# Patient Record
Sex: MALE | Race: WHITE | Employment: OTHER | URBAN - METROPOLITAN AREA
[De-identification: names, ages, dates, MRNs, and addresses within clinical notes are randomized per-mention and may not be internally consistent; named-entity substitution may affect disease eponyms.]

---

## 2017-02-10 ENCOUNTER — APPOINTMENT (OUTPATIENT)
Dept: LAB | Age: 77
End: 2017-02-10
Attending: INTERNAL MEDICINE
Payer: COMMERCIAL

## 2017-02-10 DIAGNOSIS — E11.9 TYPE 2 DIABETES MELLITUS WITHOUT COMPLICATION, WITHOUT LONG-TERM CURRENT USE OF INSULIN (HCC): ICD-10-CM

## 2017-02-10 LAB — HBA1C MFR BLD: 9 % (ref 4–6)

## 2017-02-10 PROCEDURE — 83036 HEMOGLOBIN GLYCOSYLATED A1C: CPT

## 2017-02-10 PROCEDURE — 36415 COLL VENOUS BLD VENIPUNCTURE: CPT

## 2017-02-13 ENCOUNTER — OFFICE VISIT (OUTPATIENT)
Dept: INTERNAL MEDICINE CLINIC | Facility: CLINIC | Age: 77
End: 2017-02-13

## 2017-02-13 VITALS
HEART RATE: 88 BPM | DIASTOLIC BLOOD PRESSURE: 66 MMHG | HEIGHT: 72 IN | SYSTOLIC BLOOD PRESSURE: 106 MMHG | WEIGHT: 249 LBS | RESPIRATION RATE: 16 BRPM | BODY MASS INDEX: 33.72 KG/M2

## 2017-02-13 DIAGNOSIS — E78.2 MIXED HYPERLIPIDEMIA: ICD-10-CM

## 2017-02-13 DIAGNOSIS — I10 ESSENTIAL HYPERTENSION WITH GOAL BLOOD PRESSURE LESS THAN 130/85: ICD-10-CM

## 2017-02-13 DIAGNOSIS — E11.9 TYPE 2 DIABETES MELLITUS WITHOUT COMPLICATION, WITHOUT LONG-TERM CURRENT USE OF INSULIN (HCC): Primary | ICD-10-CM

## 2017-02-13 DIAGNOSIS — R79.89 ELEVATED LIVER FUNCTION TESTS: ICD-10-CM

## 2017-02-13 PROCEDURE — 99214 OFFICE O/P EST MOD 30 MIN: CPT | Performed by: INTERNAL MEDICINE

## 2017-02-13 PROCEDURE — 99212 OFFICE O/P EST SF 10 MIN: CPT | Performed by: INTERNAL MEDICINE

## 2017-02-13 RX ORDER — LISINOPRIL 10 MG/1
10 TABLET ORAL DAILY
Qty: 90 TABLET | Refills: 3 | Status: SHIPPED | OUTPATIENT
Start: 2017-02-13 | End: 2018-02-15

## 2017-02-13 NOTE — PROGRESS NOTES
Jair Lundberg is a 64year old male. HPI:   1. Type 2 diabetes mellitus without complication (HCC)    The patient has been taking all prescribed diabetic medications at home and has been following a diabetic diet.  Recent HgA1c was 12.4 Patient advised to Take 1 tablet (850 mg total) by mouth 2 (two) times daily with meals. Disp: 180 tablet Rfl: 3   Zolpidem Tartrate 5 MG Oral Tab Take 1 tablet (5 mg total) by mouth nightly.  Disp: 90 tablet Rfl: 0   ONETOUCH ULTRA BLUE In Vitro Strip TEST 1 TIME DAILY Disp: atraumatic, normocephalic,ears and throat are clear  NECK: supple,no adenopathy,no bruits  LUNGS: clear to auscultation  CARDIO: RRR without murmur  GI: good BS's,no masses, HSM or tenderness/protuberant  EXTREMITIES: no cyanosis, clubbing or edema  DIABET

## 2017-03-01 ENCOUNTER — TELEPHONE (OUTPATIENT)
Dept: INTERNAL MEDICINE CLINIC | Facility: CLINIC | Age: 77
End: 2017-03-01

## 2017-03-01 DIAGNOSIS — E11.9 TYPE 2 DIABETES MELLITUS WITHOUT COMPLICATION, WITHOUT LONG-TERM CURRENT USE OF INSULIN (HCC): Primary | ICD-10-CM

## 2017-03-04 RX ORDER — LANCETS 33 GAUGE
EACH MISCELLANEOUS
Qty: 100 EACH | Refills: 2 | Status: SHIPPED | OUTPATIENT
Start: 2017-03-04 | End: 2019-01-01

## 2017-03-04 NOTE — TELEPHONE ENCOUNTER
ONETOUCH ULTRA TST STRPS , DELICA LANCETS   Refill Protocol Appointment Criteria: Refilled per protocol    · Appointment scheduled in the past 12 months or in the next 3 months  Recent Visits       Provider Department Primary Dx    2 weeks ago DIANA Rodriguez

## 2017-03-20 RX ORDER — ATENOLOL 50 MG/1
TABLET ORAL
Qty: 90 TABLET | Refills: 0 | Status: SHIPPED | OUTPATIENT
Start: 2017-03-20 | End: 2017-06-19

## 2017-03-29 RX ORDER — ZOLPIDEM TARTRATE 5 MG/1
5 TABLET ORAL NIGHTLY
Qty: 90 TABLET | Refills: 0 | OUTPATIENT
Start: 2017-03-29 | End: 2017-07-03

## 2017-03-29 NOTE — TELEPHONE ENCOUNTER
Refill Protocol Appointment Criteria  · Appointment scheduled in the past 6 months or in the next 3 months  Recent Visits       Provider Department Primary Dx    1 month ago Brittaney Bedoya MD [facility] [place], [facility], [address], [place] Type 2 diabetes [facility]

## 2017-04-03 RX ORDER — TRIAMTERENE AND HYDROCHLOROTHIAZIDE 37.5; 25 MG/1; MG/1
CAPSULE ORAL
Qty: 90 CAPSULE | Refills: 0 | Status: SHIPPED | OUTPATIENT
Start: 2017-04-03 | End: 2017-07-31

## 2017-05-31 ENCOUNTER — APPOINTMENT (OUTPATIENT)
Dept: LAB | Age: 77
End: 2017-05-31
Attending: INTERNAL MEDICINE
Payer: COMMERCIAL

## 2017-05-31 DIAGNOSIS — E11.9 TYPE 2 DIABETES MELLITUS WITHOUT COMPLICATION, WITHOUT LONG-TERM CURRENT USE OF INSULIN (HCC): ICD-10-CM

## 2017-05-31 PROCEDURE — 36415 COLL VENOUS BLD VENIPUNCTURE: CPT

## 2017-05-31 PROCEDURE — 83036 HEMOGLOBIN GLYCOSYLATED A1C: CPT

## 2017-06-05 ENCOUNTER — OFFICE VISIT (OUTPATIENT)
Dept: INTERNAL MEDICINE CLINIC | Facility: CLINIC | Age: 77
End: 2017-06-05

## 2017-06-05 VITALS
DIASTOLIC BLOOD PRESSURE: 67 MMHG | BODY MASS INDEX: 33.72 KG/M2 | HEART RATE: 75 BPM | RESPIRATION RATE: 16 BRPM | HEIGHT: 72 IN | SYSTOLIC BLOOD PRESSURE: 110 MMHG | WEIGHT: 249 LBS

## 2017-06-05 DIAGNOSIS — R79.89 ELEVATED LIVER FUNCTION TESTS: ICD-10-CM

## 2017-06-05 DIAGNOSIS — E78.2 MIXED HYPERLIPIDEMIA: ICD-10-CM

## 2017-06-05 DIAGNOSIS — I10 ESSENTIAL HYPERTENSION WITH GOAL BLOOD PRESSURE LESS THAN 130/85: ICD-10-CM

## 2017-06-05 DIAGNOSIS — E11.9 TYPE 2 DIABETES MELLITUS WITHOUT COMPLICATION, WITHOUT LONG-TERM CURRENT USE OF INSULIN (HCC): Primary | ICD-10-CM

## 2017-06-05 PROCEDURE — 99212 OFFICE O/P EST SF 10 MIN: CPT | Performed by: INTERNAL MEDICINE

## 2017-06-05 PROCEDURE — 99214 OFFICE O/P EST MOD 30 MIN: CPT | Performed by: INTERNAL MEDICINE

## 2017-06-05 RX ORDER — GLIMEPIRIDE 4 MG/1
4 TABLET ORAL
Qty: 90 TABLET | Refills: 3 | Status: SHIPPED | OUTPATIENT
Start: 2017-06-05 | End: 2017-09-20

## 2017-06-05 RX ORDER — GLIMEPIRIDE 4 MG/1
4 TABLET ORAL
Qty: 90 TABLET | Refills: 3 | Status: SHIPPED | OUTPATIENT
Start: 2017-06-05 | End: 2017-06-05

## 2017-06-05 NOTE — PROGRESS NOTES
Zuleima Vazquez is a 76year old male. HPI:   1. Type 2 diabetes mellitus without complication (HCC)    The patient has been taking all prescribed diabetic medications at home and has been following a diabetic diet.  Recent HgA1c was 12.4 Patient advised to fo with breakfast. Disp: 90 tablet Rfl: 3   TRIAMTERENE-HCTZ 37.5-25 MG Oral Cap TAKE 1 CAPSULE BY MOUTH EVERY MORNING. Disp: 90 capsule Rfl: 0   Zolpidem Tartrate 5 MG Oral Tab Take 1 tablet (5 mg total) by mouth nightly.  Disp: 90 tablet Rfl: 0   ATENOLOL 50 Resp 16  Ht 6' (1.829 m)  Wt 249 lb (112.946 kg)  BMI 33.76 kg/m2  GENERAL: well developed,overweight.,in no apparent distress  SKIN: no rashes,no suspicious lesions/ 1 1/4 inch laceration vertical with good approximation left occiput.   HEENT: atraumatic,

## 2017-06-19 RX ORDER — ATENOLOL 50 MG/1
TABLET ORAL
Qty: 90 TABLET | Refills: 0 | Status: SHIPPED | OUTPATIENT
Start: 2017-06-19 | End: 2017-09-20

## 2017-07-03 NOTE — TELEPHONE ENCOUNTER
Please advise on refill request.   Last refilled on 3/29/17    Refill Protocol Appointment Criteria  · Appointment scheduled in the past 6 months or in the next 3 months  Recent Outpatient Visits            4 weeks ago Type 2 diabetes mellitus without comp

## 2017-07-06 RX ORDER — ZOLPIDEM TARTRATE 5 MG/1
5 TABLET ORAL NIGHTLY
Qty: 90 TABLET | Refills: 0 | OUTPATIENT
Start: 2017-07-06 | End: 2017-10-02

## 2017-08-01 RX ORDER — TRIAMTERENE AND HYDROCHLOROTHIAZIDE 37.5; 25 MG/1; MG/1
CAPSULE ORAL
Qty: 90 CAPSULE | Refills: 0 | Status: SHIPPED | OUTPATIENT
Start: 2017-08-01 | End: 2017-11-13

## 2017-08-01 RX ORDER — SIMVASTATIN 10 MG
10 TABLET ORAL NIGHTLY
Qty: 90 TABLET | Refills: 0 | Status: SHIPPED | OUTPATIENT
Start: 2017-08-01 | End: 2017-11-13

## 2017-08-24 ENCOUNTER — TELEPHONE (OUTPATIENT)
Dept: INTERNAL MEDICINE CLINIC | Facility: CLINIC | Age: 77
End: 2017-08-24

## 2017-08-31 ENCOUNTER — TELEPHONE (OUTPATIENT)
Dept: OTHER | Age: 77
End: 2017-08-31

## 2017-08-31 NOTE — TELEPHONE ENCOUNTER
Patient's wife calling wanting to make follow up visit with PVR and was asking for lab work orders to be entered so that she and her  could  have lab work done before visit    Please advise     Patient asking that we may call her when orders are ent

## 2017-09-01 ENCOUNTER — TELEPHONE (OUTPATIENT)
Dept: INTERNAL MEDICINE CLINIC | Facility: CLINIC | Age: 77
End: 2017-09-01

## 2017-09-01 DIAGNOSIS — I10 ESSENTIAL HYPERTENSION WITH GOAL BLOOD PRESSURE LESS THAN 130/85: ICD-10-CM

## 2017-09-01 DIAGNOSIS — E11.9 TYPE 2 DIABETES MELLITUS WITHOUT COMPLICATION, WITHOUT LONG-TERM CURRENT USE OF INSULIN (HCC): Primary | ICD-10-CM

## 2017-09-07 ENCOUNTER — HOSPITAL ENCOUNTER (OUTPATIENT)
Age: 77
Discharge: HOME | End: 2017-09-07
Attending: FAMILY MEDICINE
Payer: COMMERCIAL

## 2017-09-07 ENCOUNTER — APPOINTMENT (OUTPATIENT)
Dept: GENERAL RADIOLOGY | Age: 77
End: 2017-09-07
Attending: FAMILY MEDICINE
Payer: COMMERCIAL

## 2017-09-07 VITALS
SYSTOLIC BLOOD PRESSURE: 164 MMHG | HEART RATE: 101 BPM | DIASTOLIC BLOOD PRESSURE: 69 MMHG | TEMPERATURE: 98 F | OXYGEN SATURATION: 95 % | BODY MASS INDEX: 28.44 KG/M2 | RESPIRATION RATE: 28 BRPM | HEIGHT: 72 IN | WEIGHT: 210 LBS

## 2017-09-07 DIAGNOSIS — R93.89 ABNORMAL CXR: ICD-10-CM

## 2017-09-07 DIAGNOSIS — J40 BRONCHITIS: Primary | ICD-10-CM

## 2017-09-07 PROCEDURE — 99213 OFFICE O/P EST LOW 20 MIN: CPT

## 2017-09-07 PROCEDURE — 71020 XR CHEST PA + LAT CHEST (CPT=71020): CPT | Performed by: FAMILY MEDICINE

## 2017-09-07 PROCEDURE — 99204 OFFICE O/P NEW MOD 45 MIN: CPT

## 2017-09-07 RX ORDER — AZITHROMYCIN 250 MG/1
TABLET, FILM COATED ORAL
Qty: 1 PACKAGE | Refills: 0 | Status: SHIPPED | OUTPATIENT
Start: 2017-09-07 | End: 2017-09-12

## 2017-09-07 RX ORDER — ALBUTEROL SULFATE 90 UG/1
2 AEROSOL, METERED RESPIRATORY (INHALATION) EVERY 4 HOURS PRN
Qty: 1 INHALER | Refills: 0 | Status: SHIPPED | OUTPATIENT
Start: 2017-09-07 | End: 2017-10-07

## 2017-09-07 NOTE — ED NOTES
Increase po fluids fill and finish po meds call and make appointment for follow up care with pcp in 2 days. Go to the ed for new or worse concens shotness of breath fever .

## 2017-09-07 NOTE — ED PROVIDER NOTES
Patient Seen in: [facility] Immediate Care In [address] [place] [address]    History   Patient presents with:  Cough/URI    Stated Complaint: coughing    Pt p/w co cough, congestion, rhinorrhea--onset about a month ago: no, no fever, no chills, energy ok;  H/o tob, LAPAROSCOPIC CHOLECYSTECTOMY  7/22/15: NEEDLE BIOPSY LIVER  1985: REMOVAL OF KIDNEY STONE Left      Comment: Open  4/16/15: UPPER GI ENDOSCOPY,BIOPSY    Family History   Problem Relation Age of Onset   • Cancer Mother 79     lung cancer   • Suicide History consolidation.     ============================================================  ED Course  ------------------------------------------------------------       MDM           Disposition and Plan     Clinical Impression:  Bronchitis  (primary encounter diagn

## 2017-09-19 ENCOUNTER — LAB ENCOUNTER (OUTPATIENT)
Dept: LAB | Age: 77
End: 2017-09-19
Attending: INTERNAL MEDICINE
Payer: COMMERCIAL

## 2017-09-19 ENCOUNTER — PATIENT OUTREACH (OUTPATIENT)
Dept: INTERNAL MEDICINE CLINIC | Facility: CLINIC | Age: 77
End: 2017-09-19

## 2017-09-19 DIAGNOSIS — E78.2 MIXED HYPERLIPIDEMIA: ICD-10-CM

## 2017-09-19 DIAGNOSIS — I10 ESSENTIAL HYPERTENSION WITH GOAL BLOOD PRESSURE LESS THAN 130/85: ICD-10-CM

## 2017-09-19 DIAGNOSIS — E11.9 TYPE 2 DIABETES MELLITUS WITHOUT COMPLICATION, WITHOUT LONG-TERM CURRENT USE OF INSULIN (HCC): ICD-10-CM

## 2017-09-19 LAB
ALBUMIN SERPL BCP-MCNC: 3.8 G/DL (ref 3.5–4.8)
ALBUMIN/GLOB SERPL: 1.2 {RATIO} (ref 1–2)
ALP SERPL-CCNC: 101 U/L (ref 32–100)
ALT SERPL-CCNC: 38 U/L (ref 17–63)
ANION GAP SERPL CALC-SCNC: 9 MMOL/L (ref 0–18)
AST SERPL-CCNC: 53 U/L (ref 15–41)
BASOPHILS # BLD: 0 K/UL (ref 0–0.2)
BASOPHILS NFR BLD: 1 %
BILIRUB DIRECT SERPL-MCNC: 0.1 MG/DL (ref 0–0.2)
BILIRUB SERPL-MCNC: 0.6 MG/DL (ref 0.3–1.2)
BILIRUB UR QL: NEGATIVE
BUN SERPL-MCNC: 12 MG/DL (ref 8–20)
BUN/CREAT SERPL: 15.2 (ref 10–20)
CALCIUM SERPL-MCNC: 9.1 MG/DL (ref 8.5–10.5)
CHLORIDE SERPL-SCNC: 102 MMOL/L (ref 95–110)
CHOLEST SERPL-MCNC: 144 MG/DL (ref 110–200)
CO2 SERPL-SCNC: 27 MMOL/L (ref 22–32)
COLOR UR: YELLOW
CREAT SERPL-MCNC: 0.79 MG/DL (ref 0.5–1.5)
CREAT UR-MCNC: 147.9 MG/DL
EOSINOPHIL # BLD: 0.4 K/UL (ref 0–0.7)
EOSINOPHIL NFR BLD: 8 %
ERYTHROCYTE [DISTWIDTH] IN BLOOD BY AUTOMATED COUNT: 15.7 % (ref 11–15)
GLOBULIN PLAS-MCNC: 3.2 G/DL (ref 2.5–3.7)
GLUCOSE SERPL-MCNC: 103 MG/DL (ref 70–99)
GLUCOSE UR-MCNC: NEGATIVE MG/DL
HBA1C MFR BLD: 7.5 % (ref 4–6)
HCT VFR BLD AUTO: 34 % (ref 41–52)
HDLC SERPL-MCNC: 27 MG/DL
HGB BLD-MCNC: 11 G/DL (ref 13.5–17.5)
HGB UR QL STRIP.AUTO: NEGATIVE
KETONES UR-MCNC: NEGATIVE MG/DL
LDLC SERPL CALC-MCNC: 77 MG/DL (ref 0–99)
LYMPHOCYTES # BLD: 1.2 K/UL (ref 1–4)
LYMPHOCYTES NFR BLD: 27 %
MCH RBC QN AUTO: 27.4 PG (ref 27–32)
MCHC RBC AUTO-ENTMCNC: 32.2 G/DL (ref 32–37)
MCV RBC AUTO: 85.3 FL (ref 80–100)
MICROALBUMIN UR-MCNC: 1.3 MG/DL (ref 0–1.8)
MICROALBUMIN/CREAT UR: 8.8 MG/G{CREAT} (ref 0–20)
MONOCYTES # BLD: 0.7 K/UL (ref 0–1)
MONOCYTES NFR BLD: 16 %
NEUTROPHILS # BLD AUTO: 2.2 K/UL (ref 1.8–7.7)
NEUTROPHILS NFR BLD: 48 %
NITRITE UR QL STRIP.AUTO: POSITIVE
NONHDLC SERPL-MCNC: 117 MG/DL
OSMOLALITY UR CALC.SUM OF ELEC: 286 MOSM/KG (ref 275–295)
PH UR: 6 [PH] (ref 5–8)
PLATELET # BLD AUTO: 78 K/UL (ref 140–400)
PMV BLD AUTO: 10.1 FL (ref 7.4–10.3)
POTASSIUM SERPL-SCNC: 4.4 MMOL/L (ref 3.3–5.1)
PROT SERPL-MCNC: 7 G/DL (ref 5.9–8.4)
PROT UR-MCNC: NEGATIVE MG/DL
RBC # BLD AUTO: 3.99 M/UL (ref 4.5–5.9)
RBC #/AREA URNS AUTO: 5 /HPF
SODIUM SERPL-SCNC: 138 MMOL/L (ref 136–144)
SP GR UR STRIP: 1.02 (ref 1–1.03)
TRIGL SERPL-MCNC: 198 MG/DL (ref 1–149)
TSH SERPL-ACNC: 6.36 UIU/ML (ref 0.45–5.33)
UROBILINOGEN UR STRIP-ACNC: 4
VIT C UR-MCNC: 40 MG/DL
WBC # BLD AUTO: 4.6 K/UL (ref 4–11)
WBC #/AREA URNS AUTO: 19 /HPF

## 2017-09-19 PROCEDURE — 82248 BILIRUBIN DIRECT: CPT

## 2017-09-19 PROCEDURE — 83036 HEMOGLOBIN GLYCOSYLATED A1C: CPT

## 2017-09-19 PROCEDURE — 82570 ASSAY OF URINE CREATININE: CPT

## 2017-09-19 PROCEDURE — 81001 URINALYSIS AUTO W/SCOPE: CPT

## 2017-09-19 PROCEDURE — 84443 ASSAY THYROID STIM HORMONE: CPT

## 2017-09-19 PROCEDURE — 85025 COMPLETE CBC W/AUTO DIFF WBC: CPT

## 2017-09-19 PROCEDURE — 82043 UR ALBUMIN QUANTITATIVE: CPT

## 2017-09-19 PROCEDURE — 80061 LIPID PANEL: CPT

## 2017-09-19 PROCEDURE — 80053 COMPREHEN METABOLIC PANEL: CPT

## 2017-09-19 PROCEDURE — 36415 COLL VENOUS BLD VENIPUNCTURE: CPT

## 2017-09-20 ENCOUNTER — OFFICE VISIT (OUTPATIENT)
Dept: INTERNAL MEDICINE CLINIC | Facility: CLINIC | Age: 77
End: 2017-09-20

## 2017-09-20 VITALS
HEIGHT: 72 IN | SYSTOLIC BLOOD PRESSURE: 119 MMHG | HEART RATE: 71 BPM | RESPIRATION RATE: 16 BRPM | BODY MASS INDEX: 33.46 KG/M2 | WEIGHT: 247 LBS | DIASTOLIC BLOOD PRESSURE: 70 MMHG

## 2017-09-20 DIAGNOSIS — E11.9 TYPE 2 DIABETES MELLITUS WITHOUT COMPLICATION, WITHOUT LONG-TERM CURRENT USE OF INSULIN (HCC): Primary | ICD-10-CM

## 2017-09-20 DIAGNOSIS — I10 ESSENTIAL HYPERTENSION WITH GOAL BLOOD PRESSURE LESS THAN 130/85: ICD-10-CM

## 2017-09-20 DIAGNOSIS — E78.2 MIXED HYPERLIPIDEMIA: ICD-10-CM

## 2017-09-20 DIAGNOSIS — R05.9 COUGH: ICD-10-CM

## 2017-09-20 DIAGNOSIS — L98.9 SKIN LESION OF FACE: ICD-10-CM

## 2017-09-20 PROCEDURE — 99214 OFFICE O/P EST MOD 30 MIN: CPT | Performed by: INTERNAL MEDICINE

## 2017-09-20 PROCEDURE — 99212 OFFICE O/P EST SF 10 MIN: CPT | Performed by: INTERNAL MEDICINE

## 2017-09-20 RX ORDER — GLIMEPIRIDE 4 MG/1
4 TABLET ORAL 2 TIMES DAILY
Qty: 180 TABLET | Refills: 3 | Status: SHIPPED | OUTPATIENT
Start: 2017-09-20 | End: 2018-10-23

## 2017-09-20 RX ORDER — METOPROLOL SUCCINATE 50 MG/1
50 TABLET, EXTENDED RELEASE ORAL DAILY
Qty: 90 TABLET | Refills: 3 | Status: SHIPPED | OUTPATIENT
Start: 2017-09-20 | End: 2018-09-06

## 2017-09-20 NOTE — PROGRESS NOTES
Pennie Hutchison is a 76year old male. HPI:   1. Type 2 diabetes mellitus without complication (HCC)    The patient has been taking all prescribed diabetic medications at home and has been following a diabetic diet.  Recent HgA1c was 12.4 Patient advised to fo Cap TAKE 1 CAPSULE BY MOUTH EVERY MORNING. Disp: 90 capsule Rfl: 0   SIMVASTATIN 10 MG Oral Tab Take 1 tablet (10 mg total) by mouth nightly. Disp: 90 tablet Rfl: 0   Zolpidem Tartrate 5 MG Oral Tab Take 1 tablet (5 mg total) by mouth nightly.  Disp: 90 tab /70 (BP Location: Right arm, Patient Position: Sitting, Cuff Size: adult)   Pulse 71   Resp 16   Ht 6' (1.829 m)   Wt 247 lb (112 kg)   BMI 33.50 kg/m²   GENERAL: well developed,overweight.,in no apparent distress  SKIN: no rashes,has a crusty red these issues and agrees to the plan.     The patient is asked to return in 3 months

## 2017-09-21 RX ORDER — ATENOLOL 50 MG/1
TABLET ORAL
Qty: 90 TABLET | Refills: 0 | OUTPATIENT
Start: 2017-09-21

## 2017-09-28 ENCOUNTER — TELEPHONE (OUTPATIENT)
Dept: OTHER | Age: 77
End: 2017-09-28

## 2017-09-28 NOTE — TELEPHONE ENCOUNTER
Notes Recorded by Suzanne Toro MD on 9/27/2017 at 10:33 AM CDT  Diabetes a little better cxontrolled but not great. Follow low fat and low sweet diet. Mild anemia worse. Make office visit for more testing on this.  Liver enzymes still up mildly but

## 2017-10-02 ENCOUNTER — OFFICE VISIT (OUTPATIENT)
Dept: INTERNAL MEDICINE CLINIC | Facility: CLINIC | Age: 77
End: 2017-10-02

## 2017-10-02 VITALS
WEIGHT: 252 LBS | BODY MASS INDEX: 34.13 KG/M2 | DIASTOLIC BLOOD PRESSURE: 63 MMHG | HEART RATE: 80 BPM | SYSTOLIC BLOOD PRESSURE: 136 MMHG | HEIGHT: 72 IN | RESPIRATION RATE: 16 BRPM

## 2017-10-02 DIAGNOSIS — E78.2 MIXED HYPERLIPIDEMIA: ICD-10-CM

## 2017-10-02 DIAGNOSIS — Z23 NEED FOR VACCINATION: ICD-10-CM

## 2017-10-02 DIAGNOSIS — E11.9 TYPE 2 DIABETES MELLITUS WITHOUT COMPLICATION, WITHOUT LONG-TERM CURRENT USE OF INSULIN (HCC): Primary | ICD-10-CM

## 2017-10-02 DIAGNOSIS — F51.5 NIGHTMARES: ICD-10-CM

## 2017-10-02 DIAGNOSIS — I10 ESSENTIAL HYPERTENSION WITH GOAL BLOOD PRESSURE LESS THAN 130/85: ICD-10-CM

## 2017-10-02 PROCEDURE — 90471 IMMUNIZATION ADMIN: CPT | Performed by: INTERNAL MEDICINE

## 2017-10-02 PROCEDURE — 90670 PCV13 VACCINE IM: CPT | Performed by: INTERNAL MEDICINE

## 2017-10-02 PROCEDURE — 99212 OFFICE O/P EST SF 10 MIN: CPT | Performed by: INTERNAL MEDICINE

## 2017-10-02 PROCEDURE — 99214 OFFICE O/P EST MOD 30 MIN: CPT | Performed by: INTERNAL MEDICINE

## 2017-10-02 RX ORDER — ZOLPIDEM TARTRATE 5 MG/1
5 TABLET ORAL NIGHTLY
Qty: 90 TABLET | Refills: 0 | Status: SHIPPED | OUTPATIENT
Start: 2017-10-02 | End: 2018-05-12

## 2017-10-02 NOTE — PROGRESS NOTES
Jackie Magallanes is a 76year old male. HPI:   1. Type 2 diabetes mellitus without complication (HCC)    The patient has been taking all prescribed diabetic medications at home and has been following a diabetic diet.  Recent HgA1c was 12.4 Patient advised to fo tablet Rfl: 3   Albuterol Sulfate  (90 Base) MCG/ACT Inhalation Aero Soln Inhale 2 puffs into the lungs every 4 (four) hours as needed for Wheezing. Disp: 1 Inhaler Rfl: 0   TRIAMTERENE-HCTZ 37.5-25 MG Oral Cap TAKE 1 CAPSULE BY MOUTH EVERY MORNING. denies headaches    EXAM:   /63 (BP Location: Right arm, Patient Position: Sitting, Cuff Size: large)   Pulse 80   Resp 16   Ht 6' (1.829 m)   Wt 252 lb (114.3 kg)   BMI 34.18 kg/m²   GENERAL: well developed,overweight.,in no apparent distress  SKIN:

## 2017-10-18 ENCOUNTER — NURSE TRIAGE (OUTPATIENT)
Dept: OTHER | Age: 77
End: 2017-10-18

## 2017-10-18 NOTE — TELEPHONE ENCOUNTER
Spouse called with blood sugars for patient. Blood sugar for patient this morning before breakfast was 220.   10/17/17  260  10/16/17  269  10/15/17  187  10/14/17  269  10/13/17  166  Spouse indicated that patient not having any symptoms otherwise.  Spous

## 2017-10-18 NOTE — TELEPHONE ENCOUNTER
Called wife. Start invokana 100 mg daily. Increase fluid intake. Call with sugars in a week. Keep penis area clean.

## 2017-10-19 ENCOUNTER — TELEPHONE (OUTPATIENT)
Dept: INTERNAL MEDICINE CLINIC | Facility: CLINIC | Age: 77
End: 2017-10-19

## 2017-10-19 NOTE — TELEPHONE ENCOUNTER
FAX RECEIVED FROM [employer] STATING INVOKANA NOT FORMULARY.  Ins covers Damir Santoro  Call [phone] option 2

## 2017-11-02 NOTE — TELEPHONE ENCOUNTER
Pt wife calling, advised Caleb Bejarano was ordered in place of Invokana. She verbalized understanding.

## 2017-11-07 ENCOUNTER — TELEPHONE (OUTPATIENT)
Dept: OTHER | Age: 77
End: 2017-11-07

## 2017-11-07 DIAGNOSIS — E11.9 TYPE 2 DIABETES MELLITUS WITHOUT COMPLICATION, WITHOUT LONG-TERM CURRENT USE OF INSULIN (HCC): ICD-10-CM

## 2017-11-07 RX ORDER — BLOOD-GLUCOSE METER
KIT MISCELLANEOUS
Qty: 1 KIT | Refills: 0 | Status: SHIPPED | OUTPATIENT
Start: 2017-11-07 | End: 2019-01-01

## 2017-11-07 NOTE — TELEPHONE ENCOUNTER
Spouse Ayse Jeanette called wanted to know if Dr Rhianna Rodríguez can increase the patient's metformin 850mg 2 times daily to metformin 1000mg  2 times daily. Patient has not started the Jardiance and spouse concerned about possible side effects with the Jardiance.  Sp 09/19/2017       Lab Results  Component Value Date   CREATSERUM 0.79 09/19/2017

## 2017-11-07 NOTE — TELEPHONE ENCOUNTER
Left detailed message on voicemail that Dr Joy Humphreys ok'd the metformin 1000mg 1 tablet 2 times daily and will send 90 days supply to hospital pharmacy. If any questions to give the office a call back. Rx sent to pharmacy.

## 2017-11-10 ENCOUNTER — OFFICE VISIT (OUTPATIENT)
Dept: OPTOMETRY | Facility: CLINIC | Age: 77
End: 2017-11-10

## 2017-11-10 DIAGNOSIS — E11.319 DIABETIC RETINOPATHY ASSOCIATED WITH CONTROLLED TYPE 2 DIABETES MELLITUS (HCC): Primary | ICD-10-CM

## 2017-11-10 DIAGNOSIS — H52.203 HYPEROPIA OF BOTH EYES WITH ASTIGMATISM AND PRESBYOPIA: ICD-10-CM

## 2017-11-10 DIAGNOSIS — H52.03 HYPEROPIA OF BOTH EYES WITH ASTIGMATISM AND PRESBYOPIA: ICD-10-CM

## 2017-11-10 DIAGNOSIS — H52.4 HYPEROPIA OF BOTH EYES WITH ASTIGMATISM AND PRESBYOPIA: ICD-10-CM

## 2017-11-10 PROCEDURE — 92015 DETERMINE REFRACTIVE STATE: CPT | Performed by: OPTOMETRIST

## 2017-11-10 PROCEDURE — 92014 COMPRE OPH EXAM EST PT 1/>: CPT | Performed by: OPTOMETRIST

## 2017-11-10 NOTE — PATIENT INSTRUCTIONS
Diabetic retinopathy associated with controlled type 2 diabetes mellitus ([facility] [address]  I advised patient that vision is reduced left eye most likely due to macula edema. I recommend that he see the MD's at [facility].  I offered to make an appointment tomacy

## 2017-11-10 NOTE — ASSESSMENT & PLAN NOTE
I advised patient that vision is reduced left eye most likely due to macula edema. I recommend that he see the MD's at [facility].  I offered to make an appointment today but wife works at the [employer] and needs to request a day off  and she assures Daily Assessment

## 2017-11-10 NOTE — PROGRESS NOTES
Amaury Blanton is a 76year old male. HPI:     HPI     Diabetic Eye Exam   Diabetes characteristics include Type 2, controlled with diet and taking oral medications. Duration of 4 years.            Comments   Patient is in for an annual diabetic eye exam. Vitro Strip TEST 1 TIME DAILY Disp: 100 strip Rfl: 3   MetFORMIN HCl 1000 MG Oral Tab Take 1 tablet (1,000 mg total) by mouth 2 (two) times daily with meals.  Disp: 180 tablet Rfl: 0   Zolpidem Tartrate 5 MG Oral Tab Take 1 tablet (5 mg total) by mouth irving Oriented x3:  Yes    Mood/Affect:  Normal          Dilation     Both eyes:  1.0% Mydriacyl and 2.5% Royce Synephrine @ 8:38 AM            Slit Lamp and Fundus Exam     External Exam       Right Left    External Normal Normal          Slit Lamp Exam       Rig were placed in this encounter. Meds This Visit:    No prescriptions requested or ordered in this encounter     Follow up instructions:  Return in about 1 month (around 12/10/2017) for Recheck.     11/10/2017  Scribed by: Quincy Maciel

## 2017-11-13 RX ORDER — TRIAMTERENE AND HYDROCHLOROTHIAZIDE 37.5; 25 MG/1; MG/1
CAPSULE ORAL
Qty: 90 CAPSULE | Refills: 1 | Status: SHIPPED | OUTPATIENT
Start: 2017-11-13 | End: 2018-06-07

## 2017-11-13 RX ORDER — SIMVASTATIN 10 MG
10 TABLET ORAL NIGHTLY
Qty: 90 TABLET | Refills: 1 | Status: SHIPPED | OUTPATIENT
Start: 2017-11-13 | End: 2018-05-17

## 2017-11-13 NOTE — TELEPHONE ENCOUNTER
Please advise on refill request.    Cholesterol Medications  Protocol Criteria:  · Appointment scheduled in the past 12 months or in the next 3 months  · ALT & LDL on file in the past 12 months  · ALT result < 80  · LDL result <130   Recent Outpatient Visi complication, without long-term current use of insulin [facility])    [address] [place], [address] Naaman Laura, MD    Office Visit    5 months ago Type 2 diabetes mellitus without complication, without long-term current use of insulin ([facility]    El

## 2017-11-22 ENCOUNTER — MED REC SCAN ONLY (OUTPATIENT)
Dept: INTERNAL MEDICINE CLINIC | Facility: CLINIC | Age: 77
End: 2017-11-22

## 2018-01-01 ENCOUNTER — TELEPHONE (OUTPATIENT)
Dept: PALLIATIVE CARE | Facility: HOSPITAL | Age: 78
End: 2018-01-01

## 2018-01-01 ENCOUNTER — TELEPHONE (OUTPATIENT)
Dept: RADIATION ONCOLOGY | Facility: HOSPITAL | Age: 78
End: 2018-01-01

## 2018-01-01 ENCOUNTER — OFFICE VISIT (OUTPATIENT)
Dept: INTERNAL MEDICINE CLINIC | Facility: CLINIC | Age: 78
End: 2018-01-01
Payer: COMMERCIAL

## 2018-01-01 VITALS
RESPIRATION RATE: 16 BRPM | HEIGHT: 72 IN | HEART RATE: 76 BPM | WEIGHT: 248 LBS | DIASTOLIC BLOOD PRESSURE: 74 MMHG | BODY MASS INDEX: 33.59 KG/M2 | SYSTOLIC BLOOD PRESSURE: 138 MMHG

## 2018-01-01 DIAGNOSIS — E11.9 TYPE 2 DIABETES MELLITUS WITHOUT COMPLICATION, WITHOUT LONG-TERM CURRENT USE OF INSULIN (HCC): Primary | ICD-10-CM

## 2018-01-01 DIAGNOSIS — I10 ESSENTIAL HYPERTENSION WITH GOAL BLOOD PRESSURE LESS THAN 130/85: ICD-10-CM

## 2018-01-01 DIAGNOSIS — C15.4 MALIGNANT NEOPLASM OF MIDDLE THIRD OF ESOPHAGUS (HCC): ICD-10-CM

## 2018-01-01 PROCEDURE — 99212 OFFICE O/P EST SF 10 MIN: CPT | Performed by: INTERNAL MEDICINE

## 2018-01-01 PROCEDURE — 99214 OFFICE O/P EST MOD 30 MIN: CPT | Performed by: INTERNAL MEDICINE

## 2018-01-29 NOTE — TELEPHONE ENCOUNTER
Pt's wife called in for pt requesting a refill on medication below. Pt's wife has been in contact with pharmacy several times and states they told her they have sent over requests via fax and electronically (nothing found in EPIC). Please advise.     Joey Perez

## 2018-02-10 ENCOUNTER — APPOINTMENT (OUTPATIENT)
Dept: LAB | Age: 78
End: 2018-02-10
Attending: INTERNAL MEDICINE
Payer: COMMERCIAL

## 2018-02-10 DIAGNOSIS — E78.2 MIXED HYPERLIPIDEMIA: ICD-10-CM

## 2018-02-10 DIAGNOSIS — E11.9 TYPE 2 DIABETES MELLITUS WITHOUT COMPLICATION, WITHOUT LONG-TERM CURRENT USE OF INSULIN (HCC): ICD-10-CM

## 2018-02-10 LAB — TSH SERPL-ACNC: 6.82 UIU/ML (ref 0.45–5.33)

## 2018-02-10 PROCEDURE — 36415 COLL VENOUS BLD VENIPUNCTURE: CPT

## 2018-02-10 PROCEDURE — 83036 HEMOGLOBIN GLYCOSYLATED A1C: CPT

## 2018-02-10 PROCEDURE — 84443 ASSAY THYROID STIM HORMONE: CPT

## 2018-02-11 LAB — HBA1C MFR BLD: 8.8 % (ref 4–6)

## 2018-02-15 ENCOUNTER — OFFICE VISIT (OUTPATIENT)
Dept: INTERNAL MEDICINE CLINIC | Facility: CLINIC | Age: 78
End: 2018-02-15

## 2018-02-15 VITALS
HEART RATE: 76 BPM | SYSTOLIC BLOOD PRESSURE: 124 MMHG | DIASTOLIC BLOOD PRESSURE: 82 MMHG | BODY MASS INDEX: 34 KG/M2 | RESPIRATION RATE: 16 BRPM | WEIGHT: 251 LBS

## 2018-02-15 DIAGNOSIS — E78.2 MIXED HYPERLIPIDEMIA: ICD-10-CM

## 2018-02-15 DIAGNOSIS — E11.9 TYPE 2 DIABETES MELLITUS WITHOUT COMPLICATION, WITHOUT LONG-TERM CURRENT USE OF INSULIN (HCC): Primary | ICD-10-CM

## 2018-02-15 DIAGNOSIS — E66.09 CLASS 1 OBESITY DUE TO EXCESS CALORIES WITH SERIOUS COMORBIDITY AND BODY MASS INDEX (BMI) OF 34.0 TO 34.9 IN ADULT: ICD-10-CM

## 2018-02-15 DIAGNOSIS — I10 ESSENTIAL HYPERTENSION WITH GOAL BLOOD PRESSURE LESS THAN 130/85: ICD-10-CM

## 2018-02-15 PROCEDURE — 99212 OFFICE O/P EST SF 10 MIN: CPT | Performed by: INTERNAL MEDICINE

## 2018-02-15 PROCEDURE — 99214 OFFICE O/P EST MOD 30 MIN: CPT | Performed by: INTERNAL MEDICINE

## 2018-02-15 RX ORDER — LOSARTAN POTASSIUM 50 MG/1
50 TABLET ORAL DAILY
Qty: 90 TABLET | Refills: 3 | Status: SHIPPED | OUTPATIENT
Start: 2018-02-15 | End: 2019-01-01

## 2018-02-15 RX ORDER — BENZONATATE 100 MG/1
100 CAPSULE ORAL 3 TIMES DAILY PRN
Qty: 30 CAPSULE | Refills: 1 | Status: SHIPPED | OUTPATIENT
Start: 2018-02-15 | End: 2018-05-12 | Stop reason: ALTCHOICE

## 2018-02-15 NOTE — PROGRESS NOTES
Yesi Gresham is a 76year old male. HPI:   1. Type 2 diabetes mellitus without complication (HCC)    The patient has been taking all prescribed diabetic medications at home and has been following a diabetic diet.  Recent HgA1c was 12.4 Patient advised to fo health. Current Outpatient Prescriptions:  benzonatate (TESSALON PERLES) 100 MG Oral Cap Take 1 capsule (100 mg total) by mouth 3 (three) times daily as needed for cough.  Disp: 30 capsule Rfl: 1   Losartan Potassium 50 MG Oral Tab Take 1 tablet (50 mg Alcohol use: Yes           0.0 oz/week     Comment: occasionally or sometimes  weekly       REVIEW OF SYSTEMS:   GENERAL HEALTH: feels well otherwise  SKIN: denies any unusual skin lesions or rashes  RESPIRATORY: denies shortness of breath with exertion hyperlipidemia    Continue simvastatin to 10 mg daily. Has minimally elevated LFTs thought mostly secondatry to ETOH and wine on a daily basis. Has stopped alcohol totally.     4. Class 1 obesity due to excess calories with serious comorbidity and body mass

## 2018-03-26 ENCOUNTER — TELEPHONE (OUTPATIENT)
Dept: GASTROENTEROLOGY | Facility: CLINIC | Age: 78
End: 2018-03-26

## 2018-03-26 NOTE — TELEPHONE ENCOUNTER
----- Message from Sarah Fabian RN sent at 7/22/2015 10:43 AM CDT -----  Regarding: Recall Colon  Recall colon in 3 years per PL.  Colon done 4/25/15

## 2018-05-11 ENCOUNTER — NURSE TRIAGE (OUTPATIENT)
Dept: OTHER | Age: 78
End: 2018-05-11

## 2018-05-11 NOTE — TELEPHONE ENCOUNTER
Given his age, recent chest pain, shortness of breath and leg swelling, do not believe he should wait until an appointment tomorrow.   Recommend ER visit for evaluation today

## 2018-05-11 NOTE — TELEPHONE ENCOUNTER
Action Requested: Summary for Provider     []  Critical Lab, Recommendations Needed  [] Need Additional Advice  []   FYI    []   Need Orders  [] Need Medications Sent to Pharmacy  []  Other     SUMMARY: León Almanzar (wife) noticed pt with dyspnea on exertion.

## 2018-05-12 ENCOUNTER — APPOINTMENT (OUTPATIENT)
Dept: GENERAL RADIOLOGY | Facility: HOSPITAL | Age: 78
End: 2018-05-12
Attending: EMERGENCY MEDICINE
Payer: COMMERCIAL

## 2018-05-12 ENCOUNTER — HOSPITAL ENCOUNTER (OUTPATIENT)
Facility: HOSPITAL | Age: 78
Setting detail: OBSERVATION
Discharge: HOME | End: 2018-05-13
Attending: EMERGENCY MEDICINE | Admitting: HOSPITALIST
Payer: COMMERCIAL

## 2018-05-12 ENCOUNTER — OFFICE VISIT (OUTPATIENT)
Dept: INTERNAL MEDICINE CLINIC | Facility: CLINIC | Age: 78
End: 2018-05-12

## 2018-05-12 VITALS
OXYGEN SATURATION: 95 % | HEIGHT: 72 IN | WEIGHT: 253 LBS | DIASTOLIC BLOOD PRESSURE: 56 MMHG | HEART RATE: 90 BPM | BODY MASS INDEX: 34.27 KG/M2 | SYSTOLIC BLOOD PRESSURE: 124 MMHG

## 2018-05-12 DIAGNOSIS — R06.02 SHORTNESS OF BREATH: Primary | ICD-10-CM

## 2018-05-12 DIAGNOSIS — D64.9 SEVERE ANEMIA: Primary | ICD-10-CM

## 2018-05-12 PROCEDURE — 99212 OFFICE O/P EST SF 10 MIN: CPT | Performed by: INTERNAL MEDICINE

## 2018-05-12 PROCEDURE — 30233N1 TRANSFUSION OF NONAUTOLOGOUS RED BLOOD CELLS INTO PERIPHERAL VEIN, PERCUTANEOUS APPROACH: ICD-10-PCS | Performed by: HOSPITALIST

## 2018-05-12 PROCEDURE — 99214 OFFICE O/P EST MOD 30 MIN: CPT | Performed by: INTERNAL MEDICINE

## 2018-05-12 PROCEDURE — 71046 X-RAY EXAM CHEST 2 VIEWS: CPT | Performed by: EMERGENCY MEDICINE

## 2018-05-12 PROCEDURE — 99220 INITIAL OBSERVATION CARE,LEVL III: CPT | Performed by: HOSPITALIST

## 2018-05-12 RX ORDER — DEXTROSE MONOHYDRATE 25 G/50ML
50 INJECTION, SOLUTION INTRAVENOUS AS NEEDED
Status: DISCONTINUED | OUTPATIENT
Start: 2018-05-12 | End: 2018-05-13

## 2018-05-12 RX ORDER — SODIUM CHLORIDE 9 MG/ML
INJECTION, SOLUTION INTRAVENOUS
Status: COMPLETED
Start: 2018-05-12 | End: 2018-05-12

## 2018-05-12 RX ORDER — DEXTROSE MONOHYDRATE 25 G/50ML
50 INJECTION, SOLUTION INTRAVENOUS AS NEEDED
Status: DISCONTINUED | OUTPATIENT
Start: 2018-05-12 | End: 2018-05-12

## 2018-05-12 RX ORDER — METOPROLOL SUCCINATE 50 MG/1
50 TABLET, EXTENDED RELEASE ORAL DAILY
Status: DISCONTINUED | OUTPATIENT
Start: 2018-05-12 | End: 2018-05-13

## 2018-05-12 RX ORDER — FUROSEMIDE 10 MG/ML
20 INJECTION INTRAMUSCULAR; INTRAVENOUS ONCE
Status: COMPLETED | OUTPATIENT
Start: 2018-05-12 | End: 2018-05-12

## 2018-05-12 RX ORDER — SODIUM CHLORIDE 0.9 % (FLUSH) 0.9 %
10 SYRINGE (ML) INJECTION AS NEEDED
Status: DISCONTINUED | OUTPATIENT
Start: 2018-05-12 | End: 2018-05-13

## 2018-05-12 RX ORDER — SODIUM CHLORIDE 0.9 % (FLUSH) 0.9 %
3 SYRINGE (ML) INJECTION AS NEEDED
Status: DISCONTINUED | OUTPATIENT
Start: 2018-05-12 | End: 2018-05-13

## 2018-05-12 RX ORDER — METOCLOPRAMIDE HYDROCHLORIDE 5 MG/ML
10 INJECTION INTRAMUSCULAR; INTRAVENOUS EVERY 8 HOURS PRN
Status: DISCONTINUED | OUTPATIENT
Start: 2018-05-12 | End: 2018-05-13

## 2018-05-12 RX ORDER — SODIUM CHLORIDE 9 MG/ML
INJECTION, SOLUTION INTRAVENOUS ONCE
Status: COMPLETED | OUTPATIENT
Start: 2018-05-12 | End: 2018-05-12

## 2018-05-12 RX ORDER — DOCUSATE SODIUM 100 MG/1
100 CAPSULE, LIQUID FILLED ORAL 2 TIMES DAILY
Status: DISCONTINUED | OUTPATIENT
Start: 2018-05-12 | End: 2018-05-13

## 2018-05-12 RX ORDER — ACETAMINOPHEN 325 MG/1
650 TABLET ORAL EVERY 4 HOURS PRN
Status: DISCONTINUED | OUTPATIENT
Start: 2018-05-12 | End: 2018-05-13

## 2018-05-12 RX ORDER — ONDANSETRON 2 MG/ML
4 INJECTION INTRAMUSCULAR; INTRAVENOUS EVERY 6 HOURS PRN
Status: DISCONTINUED | OUTPATIENT
Start: 2018-05-12 | End: 2018-05-13

## 2018-05-12 RX ORDER — HYDROCODONE BITARTRATE AND ACETAMINOPHEN 5; 325 MG/1; MG/1
2 TABLET ORAL EVERY 4 HOURS PRN
Status: DISCONTINUED | OUTPATIENT
Start: 2018-05-12 | End: 2018-05-13

## 2018-05-12 RX ORDER — LOSARTAN POTASSIUM 50 MG/1
50 TABLET ORAL DAILY
Status: DISCONTINUED | OUTPATIENT
Start: 2018-05-12 | End: 2018-05-13

## 2018-05-12 RX ORDER — BISACODYL 10 MG
10 SUPPOSITORY, RECTAL RECTAL
Status: DISCONTINUED | OUTPATIENT
Start: 2018-05-12 | End: 2018-05-13

## 2018-05-12 RX ORDER — GLIMEPIRIDE 4 MG/1
4 TABLET ORAL 2 TIMES DAILY WITH MEALS
Status: DISCONTINUED | OUTPATIENT
Start: 2018-05-12 | End: 2018-05-13

## 2018-05-12 RX ORDER — HYDROCODONE BITARTRATE AND ACETAMINOPHEN 5; 325 MG/1; MG/1
1 TABLET ORAL EVERY 4 HOURS PRN
Status: DISCONTINUED | OUTPATIENT
Start: 2018-05-12 | End: 2018-05-13

## 2018-05-12 RX ORDER — POLYETHYLENE GLYCOL 3350 17 G/17G
17 POWDER, FOR SOLUTION ORAL DAILY PRN
Status: DISCONTINUED | OUTPATIENT
Start: 2018-05-12 | End: 2018-05-13

## 2018-05-12 RX ORDER — TRIAMTERENE AND HYDROCHLOROTHIAZIDE 37.5; 25 MG/1; MG/1
1 CAPSULE ORAL DAILY
Status: DISCONTINUED | OUTPATIENT
Start: 2018-05-12 | End: 2018-05-13

## 2018-05-12 NOTE — ED INITIAL ASSESSMENT (HPI)
Pt with swelling to both feet x 1 week- states swelling is better when feet are elevated- also states breathing is worse when taking a few steps. Denies cp.

## 2018-05-12 NOTE — H&P
[address] Patient Status:  Observation    1940 MRN [id number]   Location [facility] 5SW/SE Attending Karla Minor MD   Hosp Day # 0 PCP Amparo Hill MD     Date:  2018  D Packs/day: 2.00      Years: 40.00        Types: Cigarettes     Quit date: 1/1/1998  Smokeless tobacco: Former User                     Alcohol use: Yes           0.0 oz/week     Comment: occasionally or sometimes  weekly    Allergies/Medications: deformity. There was full range of motion in all the extremities. EXTREMITIES: There was no edema, clubbing or cyanosis  NEUROLOGICAL:  There was no focal deficit. Cranial nerves II through XII were intact.           Results:     Lab Results  Component

## 2018-05-12 NOTE — PROGRESS NOTES
Aaron Vázquez is a [de-identified]year old male. Patient presents with:  Shortness Of Breath  Leg Swelling    HPI:   Mr. Aurea Torres presents this morning, accompanied by his wife, for evaluation. Recently he has had shortness of breath.   He states symptoms have developed LANCETS 33G Does not apply Misc Use to test blood sugar once daily Disp: 100 each Rfl: 2   Sildenafil Citrate (VIAGRA) 100 MG Oral Tab Take 1 tablet (100 mg total) by mouth daily as needed for Erectile Dysfunction.  Disp: 8 tablet Rfl: 0   aspirin 81 MG Ora on room air at rest, maintaining similar O2 saturations while walking 100 feet without desaturation but with significant shortness of breath. ASSESSMENT AND PLAN:   1. Shortness of breath  New onset exertional dyspnea with recent episode of chest pain.

## 2018-05-12 NOTE — ED PROVIDER NOTES
Patient Seen in: [facility] Emergency Department    History   Patient presents with:  Swelling Edema (cardiovascular, metabolic)    Stated Complaint: swollen feet x 1 week    HPI    69-year-old male patient presents her complaining of increasing s Physical Exam    Gen: Awake alert in no apparent distress  HEENT: Anicteric, EOMI, PERRL, clear oropharynx  Heart: Regular rate and rhythm, no murmur. Symmetric pitting lower extremity edema.   Lungs: Normal respiratory effort, clear lungs  Abdomen 1425  ------------------------------------------------------------      [facility]     Admission disposition: 5/12/2018  2:24 PM         Case discussed with Dr. Sharon Pena.   In view of the patient's severe anemia with which is symptomatic will admit patient for likel

## 2018-05-12 NOTE — RESPIRATORY THERAPY NOTE
JÚNIOR ASSESSMENT:    Pt does not have a previous diagnosis of JÚNIOR. Pt does not routinely use a CPAP device at home.

## 2018-05-12 NOTE — TELEPHONE ENCOUNTER
Patient was seen for an OV today - LMTCB x 2 to inform patient of [place] message yesterday to go to ER.      Patient was sent to ER by [place]    RN follow up 5/14/18

## 2018-05-13 ENCOUNTER — APPOINTMENT (OUTPATIENT)
Dept: CV DIAGNOSTICS | Facility: HOSPITAL | Age: 78
End: 2018-05-13
Attending: HOSPITALIST
Payer: COMMERCIAL

## 2018-05-13 VITALS
TEMPERATURE: 98 F | SYSTOLIC BLOOD PRESSURE: 115 MMHG | WEIGHT: 243.5 LBS | HEART RATE: 72 BPM | RESPIRATION RATE: 18 BRPM | HEIGHT: 72 IN | OXYGEN SATURATION: 94 % | BODY MASS INDEX: 32.98 KG/M2 | DIASTOLIC BLOOD PRESSURE: 52 MMHG

## 2018-05-13 PROCEDURE — 99217 OBSERVATION CARE DISCHARGE: CPT | Performed by: HOSPITALIST

## 2018-05-13 PROCEDURE — 93306 TTE W/DOPPLER COMPLETE: CPT | Performed by: HOSPITALIST

## 2018-05-13 RX ORDER — MAGNESIUM OXIDE 400 MG (241.3 MG MAGNESIUM) TABLET
800 TABLET ONCE
Status: COMPLETED | OUTPATIENT
Start: 2018-05-13 | End: 2018-05-13

## 2018-05-13 RX ORDER — POTASSIUM CHLORIDE 20 MEQ/1
40 TABLET, EXTENDED RELEASE ORAL EVERY 4 HOURS
Status: COMPLETED | OUTPATIENT
Start: 2018-05-13 | End: 2018-05-13

## 2018-05-13 NOTE — RESPIRATORY THERAPY NOTE
Dennis Rodriguez JÚNIOR ASSESSMENT:    Pt does not have a previous diagnosis of JÚNIOR. Pt does not routinely use a CPAP device at home. This pt is suspected to be at high risk for JÚNIOR and sleep lab packet was provided to patient for outpatient follow-up.

## 2018-05-13 NOTE — DISCHARGE SUMMARY
[facility]    Discharge Summary    Jackie Magallanes Patient Status:  Observation    1940 MRN [id number]   Location [facility] 5SW/SE Attending Shemar Carter MD   Hosp Day # 0 PCP Pedro Melendrez MD     Date of Admission: and body mass index (BMI) of 34.0 to 34.9 in adult     Severe anemia        Physical Exam:   General appearance: alert, appears stated age and cooperative  Pulmonary:  clear to auscultation bilaterally  Cardiovascular: S1, S2 normal, no murmur, click, rub 180 tablet  Refills:  3     Glucose Blood Strp      TEST 1 TIME DAILY   Quantity:  100 strip  Refills:  3     Losartan Potassium 50 MG Tabs  Commonly known as:  COZAAR      Take 1 tablet (50 mg total) by mouth daily.    Quantity:  90 tablet  Refills:  3

## 2018-05-13 NOTE — PLAN OF CARE
Problem: Patient Centered Care  Goal: Patient preferences are identified and integrated in the patient's plan of care  Interventions:  - What would you like us to know as we care for you?  Patient/family want to be kept updated  - Provide timely, complete, and symptoms of bleeding or hemorrhage  - Monitor labs and vital signs for trends  - Administer supportive blood products/factors, fluids and medications as ordered and appropriate  - Administer supportive blood products/factors as ordered and appropriate schedule   Outcome: Progressing  Standard fall precautions in place per policy, bed alarm on

## 2018-05-14 ENCOUNTER — TELEPHONE (OUTPATIENT)
Dept: GASTROENTEROLOGY | Facility: CLINIC | Age: 78
End: 2018-05-14

## 2018-05-14 ENCOUNTER — PATIENT OUTREACH (OUTPATIENT)
Dept: CASE MANAGEMENT | Age: 78
End: 2018-05-14

## 2018-05-14 RX ORDER — FERROUS SULFATE TAB EC 324 MG (65 MG FE EQUIVALENT) 324 (65 FE) MG
1 TABLET DELAYED RESPONSE ORAL 2 TIMES DAILY
Qty: 60 TABLET | Refills: 3 | Status: SHIPPED | OUTPATIENT
Start: 2018-05-14 | End: 2018-09-03

## 2018-05-14 NOTE — TELEPHONE ENCOUNTER
Madeleine from [facility] called (cell) and states she spoke to you about pt's Hb 7.0. Did you want pt seen by Salina De Jesus, or do you want schedule procedure(s)? Had transfusion and one IV iron infusion before discharge. Thanks.

## 2018-05-14 NOTE — TELEPHONE ENCOUNTER
Noted. Already sent high priority to GI schedulers. Spouse Janie Sinha contacted and accepted appt with Amarilys Tejada next Tues May 22, arrival 1:30pm and given directions to [facility].  Will obtain referral.

## 2018-05-14 NOTE — TELEPHONE ENCOUNTER
Janes Yung, please see below--did you want to add to MD spot or is next week ok? It appears that Dr Radha Parra wants you to see, but since he is booked out so far, I will send below to GI schedulers to get him on the books. Thanks.

## 2018-05-14 NOTE — TELEPHONE ENCOUNTER
Go ahead and schedule for colonoscopy + EGD with mac and colyte prep   - diabetic medications hold  - see Silver Gu for anemia to get further history

## 2018-05-14 NOTE — TELEPHONE ENCOUNTER
Dr Papo Fine, see below. Spouse is Brennon Tineo from [address] [place] [address],  [phone]. See 9/19/16 where pt was supposed to f/u at [facility] but no record that he did. Did he need to be seen in office first, or do you want to schedule? Thanks.

## 2018-05-14 NOTE — PROGRESS NOTES
S/w patient's wife Puma Stringer. Scheduled HFU for 5/17/2018. She states that patient was doing good this morning. There were no changes to his medications. It was difficult to hear her; she stated that she was in a bad reception room.  I offered to call her b

## 2018-05-14 NOTE — TELEPHONE ENCOUNTER
ED to Hosp-Admission  Discharged      5/12/2018 - 5/13/2018 (27 hours)  Pierce Watson MD   Last attending • Treatment team   Severe anemia   Principal problem

## 2018-05-14 NOTE — TELEPHONE ENCOUNTER
Nursing: please schedule bidirectional as indicated by Dr. Inez Sparks. I will send a bowel prep to the pharmacy. I do not see the patient was started on p.o. iron upon discharge. I will send this to the pharmacy.   If the patient is otherwise stable, would

## 2018-05-15 ENCOUNTER — TELEPHONE (OUTPATIENT)
Dept: CARDIOLOGY UNIT | Facility: HOSPITAL | Age: 78
End: 2018-05-15

## 2018-05-15 NOTE — TELEPHONE ENCOUNTER
CBLM to schedule procedure. Please transfer to Abilio Stubbs at ext [phone] or [phone] for scheduling. Or please transfer to [facility] in GI if unavailable.

## 2018-05-17 ENCOUNTER — TELEPHONE (OUTPATIENT)
Dept: INTERNAL MEDICINE CLINIC | Facility: CLINIC | Age: 78
End: 2018-05-17

## 2018-05-17 ENCOUNTER — OFFICE VISIT (OUTPATIENT)
Dept: INTERNAL MEDICINE CLINIC | Facility: CLINIC | Age: 78
End: 2018-05-17

## 2018-05-17 VITALS
BODY MASS INDEX: 33.46 KG/M2 | WEIGHT: 247 LBS | RESPIRATION RATE: 16 BRPM | SYSTOLIC BLOOD PRESSURE: 129 MMHG | HEART RATE: 78 BPM | HEIGHT: 72 IN | DIASTOLIC BLOOD PRESSURE: 60 MMHG

## 2018-05-17 DIAGNOSIS — D64.9 ANEMIA, UNSPECIFIED TYPE: Primary | ICD-10-CM

## 2018-05-17 DIAGNOSIS — E11.9 TYPE 2 DIABETES MELLITUS WITHOUT COMPLICATION, WITHOUT LONG-TERM CURRENT USE OF INSULIN (HCC): ICD-10-CM

## 2018-05-17 PROCEDURE — 99495 TRANSJ CARE MGMT MOD F2F 14D: CPT | Performed by: INTERNAL MEDICINE

## 2018-05-17 PROCEDURE — 1111F DSCHRG MED/CURRENT MED MERGE: CPT | Performed by: INTERNAL MEDICINE

## 2018-05-17 RX ORDER — NATEGLINIDE 120 MG/1
120 TABLET, COATED ORAL 2 TIMES DAILY WITH MEALS
Qty: 180 TABLET | Refills: 1 | Status: SHIPPED | OUTPATIENT
Start: 2018-05-17 | End: 2018-11-02

## 2018-05-17 RX ORDER — SIMVASTATIN 10 MG
10 TABLET ORAL NIGHTLY
Qty: 90 TABLET | Refills: 3 | Status: SHIPPED | OUTPATIENT
Start: 2018-05-17 | End: 2019-01-01

## 2018-05-17 NOTE — TELEPHONE ENCOUNTER
starlix sent to patients pharmacy. Hopefully it is chearper.  Call wife and let her know to  and use twice daily

## 2018-05-17 NOTE — TELEPHONE ENCOUNTER
PER [facility]:    ALTERNATIVE REQUESTED: PT DOESN'T WANT TO PAY $160 FOR 3 MONTH SUPPLY. IS THERE AN ALTERATIVE FOR HER?      THIS IS FOR JANUVIA 50 MG TABLET

## 2018-05-17 NOTE — TELEPHONE ENCOUNTER
Dr Radha Bejarano, please advise. Wife (on HIPAA) called, the new medicine, Januvia, will cost $200 out of pocket. Asking for alternative? Pharmacy verified.

## 2018-05-17 NOTE — PROGRESS NOTES
HPI:    Amanda Arreaga is a [de-identified]year old male here today for hospital follow up.    He was discharged from Inpatient hospital, [facility]  to Home   Admission Date: 5/12/18   Discharge Date: 5/13/18  Hospital Discharge Diagnoses (since 4/17/2018)     a 10 MG Oral Tab Take 1 tablet (10 mg total) by mouth nightly.    Blood Glucose Monitoring Suppl (ONETOUCH ULTRA MINI) w/Device Does not apply Kit Test blood sugar daily   Glucose Blood In Vitro Strip TEST 1 TIME DAILY   Metoprolol Succinate ER 50 MG Oral Tab congestion, sinus pain or ST  LUNGS: denies shortness of breath with exertion  CARDIOVASCULAR: denies chest pain on exertion or palpitations  GI: denies abdominal pain, denies heartburn, denies diarrhea  MUSCULOSKELETAL: denies pain, normal range of motion are: continue present meds, check HgbA1C, check fasting lipids and CMP, lose wgt with carbohydrate controlled diet and exercise, refer to Ophthalmology, check feet daily. Other orders  -     simvastatin 10 MG Oral Tab;  Take 1 tablet (10 mg total) by norman

## 2018-05-18 ENCOUNTER — TELEPHONE (OUTPATIENT)
Dept: GASTROENTEROLOGY | Facility: CLINIC | Age: 78
End: 2018-05-18

## 2018-05-18 NOTE — TELEPHONE ENCOUNTER
Booker fleming to call me back to open up time.  I already approved this time for MAC with Dafne in Endoscopy

## 2018-05-20 NOTE — H&P
[address] [facility] [address] Gastroenterology                                                                                                  Clinic History and Physical     Pa He was discharged with p.o. iron twice daily. In office today, presents for follow-up of his anemia following his recent hospitalization. He continues to feel somewhat fatigued though better comparatively.   He denies overt signs of bleeding including h beta-blocker, follow-up with Ang as directed, 1 year recall    April 2015 EGD/colonoscopy by Dr. Jose Antonio Maciel with AL Mccabe r/t history of colon polyps, abdominal pain, findings: Colon polyp ×3 (tubular adenoma), diverticulosis, internal hemorrhoids, Gloria Gamma 3350-KCl-NaBcb-NaCl-NaSulf (COLYTE WITH FLAVOR PACKS) 240 g Oral Recon Soln As directed per GI consult notes Disp: 1 Bottle Rfl: 0   simvastatin 10 MG Oral Tab Take 1 tablet (10 mg total) by mouth nightly.  Disp: 90 tablet Rfl: 3   nateglinide 120 MG Oral T negative for dysuria or gross hematuria  INTEGUMENT/BREAST:  SKIN:  negative for jaundice   ALLERGIC/IMMUNOLOGIC:  negative for hay fever  ENDOCRINE:  negative for cold intolerance and heat intolerance  MUSCULOSKELETAL:  negative for joint effusion/severe patient has a past history of adenomatous colon polyps and is due for a colonoscopy recall. Given both this and his current anemia status, I would highly  recommend a colonoscopy for further evaluation.   I reviewed the risks, benefits, limitations of the p CBC W Differential W Platelet      AFP, Tumor Marker, Serum      Hepatic Function Panel (7)    Meds This Visit:    Signed Prescriptions Disp Refills    PEG 3350-KCl-NaBcb-NaCl-NaSulf (COLYTE WITH FLAVOR PACKS) 240 g Oral Recon Soln 1 Bottle 0      Sig:

## 2018-05-22 ENCOUNTER — OFFICE VISIT (OUTPATIENT)
Dept: GASTROENTEROLOGY | Facility: CLINIC | Age: 78
End: 2018-05-22

## 2018-05-22 ENCOUNTER — TELEPHONE (OUTPATIENT)
Dept: GASTROENTEROLOGY | Facility: CLINIC | Age: 78
End: 2018-05-22

## 2018-05-22 VITALS
HEART RATE: 67 BPM | HEIGHT: 72 IN | BODY MASS INDEX: 33.46 KG/M2 | SYSTOLIC BLOOD PRESSURE: 130 MMHG | WEIGHT: 247 LBS | DIASTOLIC BLOOD PRESSURE: 61 MMHG

## 2018-05-22 DIAGNOSIS — Z86.010 HX OF ADENOMATOUS COLONIC POLYPS: ICD-10-CM

## 2018-05-22 DIAGNOSIS — K70.30 ALCOHOLIC CIRRHOSIS OF LIVER WITHOUT ASCITES (HCC): ICD-10-CM

## 2018-05-22 DIAGNOSIS — Z87.19 HISTORY OF ESOPHAGEAL VARICES: ICD-10-CM

## 2018-05-22 DIAGNOSIS — D50.9 IRON DEFICIENCY ANEMIA, UNSPECIFIED IRON DEFICIENCY ANEMIA TYPE: Primary | ICD-10-CM

## 2018-05-22 PROCEDURE — 99212 OFFICE O/P EST SF 10 MIN: CPT | Performed by: NURSE PRACTITIONER

## 2018-05-22 PROCEDURE — 99244 OFF/OP CNSLTJ NEW/EST MOD 40: CPT | Performed by: NURSE PRACTITIONER

## 2018-05-22 RX ORDER — DOCUSATE SODIUM 100 MG/1
100 CAPSULE, LIQUID FILLED ORAL 2 TIMES DAILY
COMMUNITY
End: 2018-06-13

## 2018-05-22 NOTE — PATIENT INSTRUCTIONS
1. Schedule colonoscopy/EGD with Dr. Octavio Garza w/ MAC    2.  bowel prep from pharmacy - split dose Colyte    3.  Continue all medications for procedure except hold iron 5 days prior to procedure, Hold glimepiride, metformin, Januvia, nateglinide day befo

## 2018-05-22 NOTE — TELEPHONE ENCOUNTER
Scheduled for:  Colonoscopy 72889 / EGD 03218  Provider Name: Dr. Luna Isaac  Date:  5/24/18 -- ok'd by Dafne and Dr. Luna Isaac; see TE from 5/18/18  Location:  [facility]  Sedation:  MAC  Time:  11:45 am, arrival 10:45 am  Prep: Colyte  Meds/Allergies Reconciled?:  Mike Ortega

## 2018-05-24 ENCOUNTER — SURGERY (OUTPATIENT)
Age: 78
End: 2018-05-24

## 2018-05-24 ENCOUNTER — ANESTHESIA EVENT (OUTPATIENT)
Dept: ENDOSCOPY | Facility: HOSPITAL | Age: 78
End: 2018-05-24
Payer: COMMERCIAL

## 2018-05-24 ENCOUNTER — TELEPHONE (OUTPATIENT)
Dept: GASTROENTEROLOGY | Facility: CLINIC | Age: 78
End: 2018-05-24

## 2018-05-24 ENCOUNTER — ANESTHESIA (OUTPATIENT)
Dept: ENDOSCOPY | Facility: HOSPITAL | Age: 78
End: 2018-05-24
Payer: COMMERCIAL

## 2018-05-24 ENCOUNTER — HOSPITAL ENCOUNTER (OUTPATIENT)
Facility: HOSPITAL | Age: 78
Setting detail: HOSPITAL OUTPATIENT SURGERY
Discharge: HOME | End: 2018-05-24
Attending: INTERNAL MEDICINE | Admitting: INTERNAL MEDICINE
Payer: COMMERCIAL

## 2018-05-24 DIAGNOSIS — D50.9 IRON DEFICIENCY ANEMIA, UNSPECIFIED IRON DEFICIENCY ANEMIA TYPE: ICD-10-CM

## 2018-05-24 PROCEDURE — 43239 EGD BIOPSY SINGLE/MULTIPLE: CPT | Performed by: INTERNAL MEDICINE

## 2018-05-24 PROCEDURE — 0DBN8ZX EXCISION OF SIGMOID COLON, VIA NATURAL OR ARTIFICIAL OPENING ENDOSCOPIC, DIAGNOSTIC: ICD-10-PCS | Performed by: INTERNAL MEDICINE

## 2018-05-24 PROCEDURE — 0DB68ZX EXCISION OF STOMACH, VIA NATURAL OR ARTIFICIAL OPENING ENDOSCOPIC, DIAGNOSTIC: ICD-10-PCS | Performed by: INTERNAL MEDICINE

## 2018-05-24 PROCEDURE — 45385 COLONOSCOPY W/LESION REMOVAL: CPT | Performed by: INTERNAL MEDICINE

## 2018-05-24 PROCEDURE — 0DB28ZX EXCISION OF MIDDLE ESOPHAGUS, VIA NATURAL OR ARTIFICIAL OPENING ENDOSCOPIC, DIAGNOSTIC: ICD-10-PCS | Performed by: INTERNAL MEDICINE

## 2018-05-24 PROCEDURE — 0DBK8ZX EXCISION OF ASCENDING COLON, VIA NATURAL OR ARTIFICIAL OPENING ENDOSCOPIC, DIAGNOSTIC: ICD-10-PCS | Performed by: INTERNAL MEDICINE

## 2018-05-24 PROCEDURE — 45380 COLONOSCOPY AND BIOPSY: CPT | Performed by: INTERNAL MEDICINE

## 2018-05-24 RX ORDER — SODIUM CHLORIDE, SODIUM LACTATE, POTASSIUM CHLORIDE, CALCIUM CHLORIDE 600; 310; 30; 20 MG/100ML; MG/100ML; MG/100ML; MG/100ML
INJECTION, SOLUTION INTRAVENOUS CONTINUOUS
Status: DISCONTINUED | OUTPATIENT
Start: 2018-05-24 | End: 2018-05-24

## 2018-05-24 RX ORDER — MIDAZOLAM HYDROCHLORIDE 1 MG/ML
INJECTION INTRAMUSCULAR; INTRAVENOUS AS NEEDED
Status: DISCONTINUED | OUTPATIENT
Start: 2018-05-24 | End: 2018-05-24 | Stop reason: SURG

## 2018-05-24 RX ORDER — DEXTROSE MONOHYDRATE 25 G/50ML
50 INJECTION, SOLUTION INTRAVENOUS
Status: DISCONTINUED | OUTPATIENT
Start: 2018-05-24 | End: 2018-05-24

## 2018-05-24 RX ORDER — NALOXONE HYDROCHLORIDE 0.4 MG/ML
80 INJECTION, SOLUTION INTRAMUSCULAR; INTRAVENOUS; SUBCUTANEOUS AS NEEDED
Status: DISCONTINUED | OUTPATIENT
Start: 2018-05-24 | End: 2018-05-24

## 2018-05-24 RX ORDER — OMEPRAZOLE 20 MG/1
20 CAPSULE, DELAYED RELEASE ORAL EVERY MORNING
Qty: 30 CAPSULE | Refills: 2 | Status: SHIPPED | OUTPATIENT
Start: 2018-05-24 | End: 2018-06-27 | Stop reason: DRUGHIGH

## 2018-05-24 RX ADMIN — MIDAZOLAM HYDROCHLORIDE 2 MG: 1 INJECTION INTRAMUSCULAR; INTRAVENOUS at 13:55:00

## 2018-05-24 NOTE — ANESTHESIA POSTPROCEDURE EVALUATION
Patient: Amaury Blanton    Procedure Summary     Date:  05/24/18 Room / Location:  [address] [place] [address] ENDOSCOPY 03 / [address] [place] [address] ENDOSCOPY    Anesthesia Start:  5368 Anesthesia Stop:      Procedures:       COLONOSCOPY (N/A )      ESOPHAGOGASTRODUODENOSCOPY (EGD) (N/A ) Diagnosis:

## 2018-05-24 NOTE — TELEPHONE ENCOUNTER
I sent the omeprazole to the pharmacy[facility]ancarlos here at hospital - please see if Cookie Daley wants it sent anywhere else.

## 2018-05-24 NOTE — TELEPHONE ENCOUNTER
I spoke to Ariela Dionicio, because of [facility] insurance, has to go to her [facility] mone. I cancelled [facility] rx and called Omeprazole rx to [address] [place] [address] notified.

## 2018-05-24 NOTE — ANESTHESIA PREPROCEDURE EVALUATION
Anesthesia PreOp Note    HPI:     Pennie Hutchison is a [de-identified]year old male who presents for preoperative consultation requested by: Concha Tenorio MD    Date of Surgery: 5/24/2018    Procedure(s):  COLONOSCOPY  ESOPHAGOGASTRODUODENOSCOPY (EGD)  Indication: Iro Bilateral      Comment: Dr. Juany Alvarado  No date: CHOLECYSTECTOMY  4/16/15: COLONOSCOPY & POLYPECTOMY  7/22/15: LAPAROSCOPIC CHOLECYSTECTOMY  7/22/15: NEEDLE BIOPSY LIVER  1985: REMOVAL OF KIDNEY STONE Left      Comment: Open  4/16/15: UPPER GI ENDOSCOPY,BIOPSY 5/21/2018   PEG 3350-KCl-NaBcb-NaCl-NaSulf (COLYTE WITH FLAVOR PACKS) 240 g Oral Recon Soln As directed per GI consult notes Disp: 1 Bottle Rfl: 0    SITagliptin Phosphate (JANUVIA) 50 MG Oral Tab Take 1 tablet (50 mg total) by mouth daily.  Disp: 90 tablet 05/13/2018   GLU 86 05/13/2018   PGLU 171 (H) 05/24/2018   CA 8.5 05/13/2018          Vital Signs: Body mass index is 32.82 kg/m². height is 1.829 m (6') and weight is 109.8 kg (242 lb). His blood pressure is 137/60 and his pulse is 63.  His respiration

## 2018-05-25 ENCOUNTER — TELEPHONE (OUTPATIENT)
Dept: GASTROENTEROLOGY | Facility: CLINIC | Age: 78
End: 2018-05-25

## 2018-05-25 VITALS
RESPIRATION RATE: 17 BRPM | HEART RATE: 55 BPM | WEIGHT: 242 LBS | HEIGHT: 72 IN | BODY MASS INDEX: 32.78 KG/M2 | DIASTOLIC BLOOD PRESSURE: 62 MMHG | OXYGEN SATURATION: 96 % | SYSTOLIC BLOOD PRESSURE: 136 MMHG

## 2018-05-25 NOTE — OPERATIVE REPORT
[facility] HOSP - [facility] Endoscopy Report      Preoperative Diagnosis:  - severe anemia      Postoperative Diagnosis:  - colon polyp x 1  - diverticulosis  - internal hemorrhoids  - possible portal colopathy  - gastric ulcer x 1  - esophagitis  - possibl retroflexed view. The esophagus showed a focal area of esophagitis at 32 cm, biopsy taken x 1.   In the distal esophagus just above the GE junction were 2 areas of bluish discoloration - possible 2 small columns of esophageal varices, no stigmata of ble

## 2018-05-25 NOTE — H&P
History & Physical Examination    Patient Name: Benito Tipton  MRN: [id number]  [facility]: [id number]  YOB: 1940    Diagnosis: anemia    No prescriptions prior to admission. No current facility-administered medications for this encounter.      Parmjit Buitrago above. Trudy Jewell.  Norberto  5/24/2018  11:10 PM

## 2018-05-29 ENCOUNTER — TELEPHONE (OUTPATIENT)
Dept: GASTROENTEROLOGY | Facility: CLINIC | Age: 78
End: 2018-05-29

## 2018-05-29 NOTE — TELEPHONE ENCOUNTER
Discussed with Obduliablaze Da Silva pts wife, he has findings in the esophagus that I need to discuss with them, I would like to have him see me in the office ASAP. Could I add this week ? Please check with Ruel Martins on options.   I am available tomorrow afternoon, T

## 2018-05-29 NOTE — TELEPHONE ENCOUNTER
I sent message to [facility] to check schedule. We have 3 providers in the afternoon tomorrow, and 1 staff at [facility]. Friday urgent spot at 3 pm would be better. Will await Janieta response, then contact pt.

## 2018-05-30 NOTE — TELEPHONE ENCOUNTER
Addendum to below. This was ok'd per Dr Carmen Vogt and Drea Mott. Val Medina called back and pt added to template.

## 2018-05-30 NOTE — TELEPHONE ENCOUNTER
I spoke to spouse, Gurdeep Regalado works in endo--she was able to accept 3:30 app[place] [facility] [place] Beulah Caba this Friday June 1. I left Dat[employer] for Cyndi Fagan and sent her a link to add the pt. Requested call back to confirm.

## 2018-06-01 ENCOUNTER — TELEPHONE (OUTPATIENT)
Dept: GASTROENTEROLOGY | Facility: CLINIC | Age: 78
End: 2018-06-01

## 2018-06-01 ENCOUNTER — TELEPHONE (OUTPATIENT)
Dept: INTERNAL MEDICINE CLINIC | Facility: CLINIC | Age: 78
End: 2018-06-01

## 2018-06-01 NOTE — TELEPHONE ENCOUNTER
Entered into EPIC:Recall EGD in 3 months per Dr. Dane Saul. Last EGD done 5/24/18, next due 8/24/2018 to f/u on gastric ulcer. Snapshot updated.

## 2018-06-01 NOTE — PATIENT INSTRUCTIONS
Squamous cell carcinoma in situ  - Prakash will contact you on Monday to set up oncology consultation    Anemia  - repeat blood count next week

## 2018-06-01 NOTE — PROGRESS NOTES
Misty Magaña is a [de-identified]year old male. HPI:   Patient presents with:  Results: discuss results from procedures    The patient is a 78-year-old male who has a history of liver disease from alcohol use who was evaluation for anemia.   He was found to have a sm Comment: Occasionally       Medications (Active prior to today's visit):    Current Outpatient Prescriptions:  omeprazole 20 MG Oral Capsule Delayed Release Take 1 capsule (20 mg total) by mouth every morning.  Disp: 30 capsule Rfl: 2   docusate sodium (STO or shortness of breath,  No neurologic or dermatologic symptoms. PHYSICAL EXAM:   Blood pressure 144/70, pulse 67, height 6' (1.829 m), weight 246 lb (111.6 kg).     The patient appears their stated age and is in no acute distress  HEENT- anicteric scle

## 2018-06-02 ENCOUNTER — LAB ENCOUNTER (OUTPATIENT)
Dept: LAB | Age: 78
End: 2018-06-02
Attending: NURSE PRACTITIONER
Payer: COMMERCIAL

## 2018-06-02 DIAGNOSIS — D64.9 ANEMIA, UNSPECIFIED TYPE: ICD-10-CM

## 2018-06-02 DIAGNOSIS — D50.9 IRON DEFICIENCY ANEMIA, UNSPECIFIED IRON DEFICIENCY ANEMIA TYPE: ICD-10-CM

## 2018-06-02 DIAGNOSIS — K70.30 ALCOHOLIC CIRRHOSIS OF LIVER WITHOUT ASCITES (HCC): ICD-10-CM

## 2018-06-02 PROCEDURE — 83036 HEMOGLOBIN GLYCOSYLATED A1C: CPT | Performed by: INTERNAL MEDICINE

## 2018-06-02 PROCEDURE — 82570 ASSAY OF URINE CREATININE: CPT | Performed by: INTERNAL MEDICINE

## 2018-06-02 PROCEDURE — 81001 URINALYSIS AUTO W/SCOPE: CPT | Performed by: INTERNAL MEDICINE

## 2018-06-02 PROCEDURE — 82043 UR ALBUMIN QUANTITATIVE: CPT | Performed by: INTERNAL MEDICINE

## 2018-06-02 PROCEDURE — 82105 ALPHA-FETOPROTEIN SERUM: CPT

## 2018-06-02 PROCEDURE — 80076 HEPATIC FUNCTION PANEL: CPT

## 2018-06-02 PROCEDURE — 36415 COLL VENOUS BLD VENIPUNCTURE: CPT | Performed by: INTERNAL MEDICINE

## 2018-06-02 PROCEDURE — 84443 ASSAY THYROID STIM HORMONE: CPT | Performed by: INTERNAL MEDICINE

## 2018-06-02 PROCEDURE — 80061 LIPID PANEL: CPT | Performed by: INTERNAL MEDICINE

## 2018-06-02 PROCEDURE — 85025 COMPLETE CBC W/AUTO DIFF WBC: CPT

## 2018-06-04 ENCOUNTER — TELEPHONE (OUTPATIENT)
Dept: HEMATOLOGY/ONCOLOGY | Facility: HOSPITAL | Age: 78
End: 2018-06-04

## 2018-06-04 ENCOUNTER — TELEPHONE (OUTPATIENT)
Dept: INTERNAL MEDICINE CLINIC | Facility: CLINIC | Age: 78
End: 2018-06-04

## 2018-06-04 DIAGNOSIS — C15.9 SQUAMOUS CELL ESOPHAGEAL CANCER (HCC): Primary | ICD-10-CM

## 2018-06-04 NOTE — TELEPHONE ENCOUNTER
Patient called requesting an order for Dr. Kerrie Johnson for Squamous cell esophageal cancer. Please place order if you agree. A referral is not required.          Thank you,  Hai Garcia

## 2018-06-04 NOTE — TELEPHONE ENCOUNTER
PARAS introducing myself as the GI Navigator. Instructed to call back so I may coordinate his medical oncology consult.

## 2018-06-05 ENCOUNTER — HOSPITAL ENCOUNTER (OUTPATIENT)
Dept: ULTRASOUND IMAGING | Age: 78
Discharge: HOME | End: 2018-06-05
Attending: NURSE PRACTITIONER
Payer: COMMERCIAL

## 2018-06-05 DIAGNOSIS — K70.30 ALCOHOLIC CIRRHOSIS OF LIVER WITHOUT ASCITES (HCC): ICD-10-CM

## 2018-06-05 PROCEDURE — 76705 ECHO EXAM OF ABDOMEN: CPT | Performed by: NURSE PRACTITIONER

## 2018-06-06 ENCOUNTER — APPOINTMENT (OUTPATIENT)
Dept: LAB | Facility: HOSPITAL | Age: 78
End: 2018-06-06
Attending: INTERNAL MEDICINE
Payer: COMMERCIAL

## 2018-06-06 ENCOUNTER — TELEPHONE (OUTPATIENT)
Dept: OTHER | Age: 78
End: 2018-06-06

## 2018-06-06 ENCOUNTER — OFFICE VISIT (OUTPATIENT)
Dept: HEMATOLOGY/ONCOLOGY | Facility: HOSPITAL | Age: 78
End: 2018-06-06
Attending: INTERNAL MEDICINE
Payer: COMMERCIAL

## 2018-06-06 VITALS
TEMPERATURE: 98 F | RESPIRATION RATE: 18 BRPM | BODY MASS INDEX: 33.05 KG/M2 | HEIGHT: 72 IN | SYSTOLIC BLOOD PRESSURE: 135 MMHG | HEART RATE: 63 BPM | DIASTOLIC BLOOD PRESSURE: 53 MMHG | WEIGHT: 244 LBS

## 2018-06-06 DIAGNOSIS — Z87.891 HISTORY OF TOBACCO USE: ICD-10-CM

## 2018-06-06 DIAGNOSIS — Z87.898 HISTORY OF ALCOHOL USE: ICD-10-CM

## 2018-06-06 DIAGNOSIS — C15.4 MALIGNANT NEOPLASM OF MIDDLE THIRD OF ESOPHAGUS (HCC): ICD-10-CM

## 2018-06-06 DIAGNOSIS — R16.2 HEPATOSPLENOMEGALY: ICD-10-CM

## 2018-06-06 DIAGNOSIS — C15.4 MALIGNANT NEOPLASM OF MIDDLE THIRD OF ESOPHAGUS (HCC): Primary | ICD-10-CM

## 2018-06-06 DIAGNOSIS — D69.6 THROMBOCYTOPENIA (HCC): ICD-10-CM

## 2018-06-06 PROCEDURE — 82378 CARCINOEMBRYONIC ANTIGEN: CPT

## 2018-06-06 PROCEDURE — 99245 OFF/OP CONSLTJ NEW/EST HI 55: CPT | Performed by: INTERNAL MEDICINE

## 2018-06-06 PROCEDURE — 36415 COLL VENOUS BLD VENIPUNCTURE: CPT

## 2018-06-06 PROCEDURE — 86301 IMMUNOASSAY TUMOR CA 19-9: CPT

## 2018-06-06 NOTE — TELEPHONE ENCOUNTER
Spoke with wife Irvin Guevara (GUY verified) and relayed PVR message below--she verbalizes understanding and agreement. She reports patient is currently not with her, but is coming to the [facility] where she works today in approximately 2 hours.  She will a

## 2018-06-07 NOTE — TELEPHONE ENCOUNTER
Please advise on refill d/t recent hospitalization/change in medical condition.     Hypertensive Medications  Protocol Criteria:  · Appointment scheduled in the past 6 months or in the next 3 months  · BMP or CMP in the past 12 months  · Creatinine result < 09/06/2014   ANIONGAP 9 05/13/2018   OSMOCALC 281 05/13/2018

## 2018-06-07 NOTE — PROGRESS NOTES
Cancer Center Consultation Note    Patient Name: Amairani Archer   YOB: 1940   Medical Record Number: [id number]   CSN: [id number]   Consulting Physician: Michelle Martin MD  Referring Physician(s): Ebenezer Briones  Date of Consultation: 6/7/20 Procedure: COLONOSCOPY;  Surgeon: Olimpia Batista MD;  Location: [facility] ENDOSCOPY  4/16/15: COLONOSCOPY & POLYPECTOMY  7/22/15: LAPAROSCOPIC CHOLECYSTECTOMY  7/22/15: NEEDLE BIOPSY LIVER  1985: REMOVAL OF KIDNEY STONE Left      Comment: Open 100 MG Oral Cap Take 100 mg by mouth 2 (two) times daily. Disp:  Rfl:    simvastatin 10 MG Oral Tab Take 1 tablet (10 mg total) by mouth nightly.  Disp: 90 tablet Rfl: 3   SITagliptin Phosphate (JANUVIA) 50 MG Oral Tab Take 1 tablet (50 mg total) by mouth d and pruritus. Hematologic/lymphatic: Negative for easy bruising, bleeding, and lymphadenopathy. Musculoskeletal: Negative for myalgias, arthralgias, muscle weakness.   Neurological: Negative for headaches, dizziness, speech problems, gait problems and wea 05/13/2018 05:42 AM   GFRNAA >60 05/13/2018 05:42 AM   CA 8.5 05/13/2018 05:42 AM   ALB 3.4 (L) 06/02/2018 07:54 AM    05/13/2018 05:42 AM   K 3.5 05/13/2018 05:42 AM    05/13/2018 05:42 AM   CO2 25 05/13/2018 05:42 AM   ALKPHO 101 (H) 06/02/20 several comorbid conditions including prior history of heavy alcohol and tobacco use in the past with evidence of hepatosplenomegaly and thrombocytopenia presents for newly diagnosed, cell carcinoma mid esophagus; pTis, carcinoma in situ    Plan:    1.) [address]  [facility]

## 2018-06-07 NOTE — TELEPHONE ENCOUNTER
Current Outpatient Prescriptions:     •  TRIAMTERENE-HCTZ 37.5-25 MG Oral Cap, TAKE 1 CAPSULE BY MOUTH EVERY MORNING., Disp: 90 capsule, Rfl: 1

## 2018-06-10 RX ORDER — TRIAMTERENE AND HYDROCHLOROTHIAZIDE 37.5; 25 MG/1; MG/1
CAPSULE ORAL
Qty: 90 CAPSULE | Refills: 1 | Status: SHIPPED | OUTPATIENT
Start: 2018-06-10 | End: 2018-12-02

## 2018-06-11 ENCOUNTER — OFFICE VISIT (OUTPATIENT)
Dept: INTERNAL MEDICINE CLINIC | Facility: CLINIC | Age: 78
End: 2018-06-11

## 2018-06-11 VITALS
WEIGHT: 241 LBS | HEIGHT: 72 IN | BODY MASS INDEX: 32.64 KG/M2 | HEART RATE: 59 BPM | SYSTOLIC BLOOD PRESSURE: 134 MMHG | RESPIRATION RATE: 16 BRPM | DIASTOLIC BLOOD PRESSURE: 68 MMHG

## 2018-06-11 DIAGNOSIS — C15.9 SQUAMOUS CELL ESOPHAGEAL CANCER (HCC): ICD-10-CM

## 2018-06-11 DIAGNOSIS — I10 ESSENTIAL HYPERTENSION WITH GOAL BLOOD PRESSURE LESS THAN 130/85: ICD-10-CM

## 2018-06-11 DIAGNOSIS — E11.9 TYPE 2 DIABETES MELLITUS WITHOUT COMPLICATION, WITHOUT LONG-TERM CURRENT USE OF INSULIN (HCC): Primary | ICD-10-CM

## 2018-06-11 PROCEDURE — 99214 OFFICE O/P EST MOD 30 MIN: CPT | Performed by: INTERNAL MEDICINE

## 2018-06-11 PROCEDURE — 99212 OFFICE O/P EST SF 10 MIN: CPT | Performed by: INTERNAL MEDICINE

## 2018-06-11 NOTE — PROGRESS NOTES
Amanda Arreaga is a [de-identified]year old male. HPI:   1. Type 2 diabetes mellitus without complication (HCC)    The patient has been taking all prescribed diabetic medications at home and has been following a diabetic diet.  Recent HgA1c was 12.4 Patient advised to fo (120 mg total) by mouth 2 (two) times daily with meals. Disp: 180 tablet Rfl: 1   Ferrous Sulfate 324 (65 Fe) MG Oral Tab EC Take 1 tablet by mouth 2 (two) times daily.  Disp: 60 tablet Rfl: 3   Losartan Potassium 50 MG Oral Tab Take 1 tablet (50 mg total) REVIEW OF SYSTEMS:   GENERAL HEALTH: feels well otherwise  SKIN: denies any unusual skin lesions or rashes  RESPIRATORY: denies shortness of breath with exertion  CARDIOVASCULAR: denies chest pain on exertion  GI: denies abdominal pain and denies heart the plan.     The patient is asked to return in 3 months

## 2018-06-15 ENCOUNTER — HOSPITAL ENCOUNTER (OUTPATIENT)
Dept: NUCLEAR MEDICINE | Facility: HOSPITAL | Age: 78
Discharge: HOME | End: 2018-06-15
Attending: INTERNAL MEDICINE
Payer: COMMERCIAL

## 2018-06-15 DIAGNOSIS — C15.4 MALIGNANT NEOPLASM OF MIDDLE THIRD OF ESOPHAGUS (HCC): ICD-10-CM

## 2018-06-15 PROCEDURE — 82962 GLUCOSE BLOOD TEST: CPT

## 2018-06-15 PROCEDURE — 78815 PET IMAGE W/CT SKULL-THIGH: CPT | Performed by: INTERNAL MEDICINE

## 2018-06-18 ENCOUNTER — TELEPHONE (OUTPATIENT)
Dept: GASTROENTEROLOGY | Facility: CLINIC | Age: 78
End: 2018-06-18

## 2018-06-18 NOTE — TELEPHONE ENCOUNTER
----- Message from Ann Marie Warner MD sent at 6/5/2018  5:56 PM CDT -----  Liver ultrasound reviewed- no mass lesion   Repeat in 6 months with AFP and liver labs. Orders placed.

## 2018-06-18 NOTE — TELEPHONE ENCOUNTER
Spouse contacted (works in [address] [place] [address]) --have GUY--given message below, and that I am mailing labs due 12/2/18 and liver US due 12/3/18. Address confirmed.

## 2018-06-19 ENCOUNTER — OFFICE VISIT (OUTPATIENT)
Dept: HEMATOLOGY/ONCOLOGY | Facility: HOSPITAL | Age: 78
End: 2018-06-19
Attending: INTERNAL MEDICINE
Payer: COMMERCIAL

## 2018-06-19 VITALS
DIASTOLIC BLOOD PRESSURE: 49 MMHG | WEIGHT: 244 LBS | TEMPERATURE: 97 F | HEIGHT: 72 IN | BODY MASS INDEX: 33.05 KG/M2 | RESPIRATION RATE: 18 BRPM | SYSTOLIC BLOOD PRESSURE: 125 MMHG | HEART RATE: 63 BPM

## 2018-06-19 DIAGNOSIS — Z87.891 HISTORY OF TOBACCO USE: ICD-10-CM

## 2018-06-19 DIAGNOSIS — R16.2 HEPATOSPLENOMEGALY: ICD-10-CM

## 2018-06-19 DIAGNOSIS — C15.4 MALIGNANT NEOPLASM OF MIDDLE THIRD OF ESOPHAGUS (HCC): ICD-10-CM

## 2018-06-19 DIAGNOSIS — Z87.898 HISTORY OF ALCOHOL USE: Primary | ICD-10-CM

## 2018-06-19 DIAGNOSIS — D69.6 THROMBOCYTOPENIA (HCC): ICD-10-CM

## 2018-06-19 PROCEDURE — 99215 OFFICE O/P EST HI 40 MIN: CPT | Performed by: INTERNAL MEDICINE

## 2018-06-20 NOTE — PROGRESS NOTES
[facility] Progress Note    Patient Name: Juan Carlos Lugo   YOB: 1940   Medical Record Number: [id number]   CSN: [id number]   Consulting Physician: Ed Do MD  Referring Physician(s): Spencer Day  Date of Visit: 6/19/2018     Omayra Stevenson Chicken pox    • Colon polyp    • Diabetes ([facility] [address]    • Esophageal cancer (HCC)    • Esophageal varices (HCC)    • Essential hypertension    • Hearing loss    • Hepatitis    • Hepatosplenomegaly    • High cholesterol    • History of alcohol use    • History wife, Obdulia Da Silva, x 30yrs. He has 5 adult children from a previous marriage. He is originally [place]. 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 He has been in the [place] since age 21. He and his wife live in [address] [place] [address].  Lashell Parmar enjoys Philtro change in bowel habits, diarrhea, constipation and abdominal pain. Integument/breast: Negative for rash, skin lesions, and pruritus. Hematologic/lymphatic: Negative for easy bruising, bleeding, and lymphadenopathy.   Musculoskeletal: Negative for myalgias Results  Component Value Date/Time   GLU 86 05/13/2018 05:42 AM   BUN 12 05/13/2018 05:42 AM   CREATSERUM 0.55 05/13/2018 05:42 AM   GFRNAA >60 05/13/2018 05:42 AM   CA 8.5 05/13/2018 05:42 AM   ALB 3.4 (L) 06/02/2018 07:54 AM    05/13/2018 05:42 AM hypermetabolic. There is subtle   haziness to the mesenteric fat suggesting an inflammatory process. There are bilateral fat containing inguinal hernias present. There are few prominent but nonenlarged retroperitoneal lymph nodes.   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

## 2018-06-21 ENCOUNTER — ANESTHESIA (OUTPATIENT)
Dept: ENDOSCOPY | Facility: HOSPITAL | Age: 78
End: 2018-06-21
Payer: COMMERCIAL

## 2018-06-21 ENCOUNTER — SURGERY (OUTPATIENT)
Age: 78
End: 2018-06-21

## 2018-06-21 ENCOUNTER — ANESTHESIA EVENT (OUTPATIENT)
Dept: ENDOSCOPY | Facility: HOSPITAL | Age: 78
End: 2018-06-21
Payer: COMMERCIAL

## 2018-06-21 ENCOUNTER — HOSPITAL ENCOUNTER (OUTPATIENT)
Facility: HOSPITAL | Age: 78
Setting detail: HOSPITAL OUTPATIENT SURGERY
Discharge: HOME | End: 2018-06-21
Attending: INTERNAL MEDICINE | Admitting: INTERNAL MEDICINE
Payer: COMMERCIAL

## 2018-06-21 DIAGNOSIS — C15.9 MALIGNANT NEOPLASM OF ESOPHAGUS, UNSPECIFIED LOCATION (HCC): ICD-10-CM

## 2018-06-21 PROCEDURE — 0DB28ZX EXCISION OF MIDDLE ESOPHAGUS, VIA NATURAL OR ARTIFICIAL OPENING ENDOSCOPIC, DIAGNOSTIC: ICD-10-PCS | Performed by: INTERNAL MEDICINE

## 2018-06-21 PROCEDURE — 88309 TISSUE EXAM BY PATHOLOGIST: CPT | Performed by: INTERNAL MEDICINE

## 2018-06-21 PROCEDURE — A4216 STERILE WATER/SALINE, 10 ML: HCPCS

## 2018-06-21 PROCEDURE — 82962 GLUCOSE BLOOD TEST: CPT

## 2018-06-21 RX ORDER — ONDANSETRON 2 MG/ML
INJECTION INTRAMUSCULAR; INTRAVENOUS AS NEEDED
Status: DISCONTINUED | OUTPATIENT
Start: 2018-06-21 | End: 2018-06-21 | Stop reason: SURG

## 2018-06-21 RX ORDER — CHOLECALCIFEROL (VITAMIN D3) 125 MCG
5 CAPSULE ORAL NIGHTLY PRN
COMMUNITY

## 2018-06-21 RX ORDER — NALOXONE HYDROCHLORIDE 0.4 MG/ML
80 INJECTION, SOLUTION INTRAMUSCULAR; INTRAVENOUS; SUBCUTANEOUS AS NEEDED
Status: CANCELLED | OUTPATIENT
Start: 2018-06-21 | End: 2018-06-21

## 2018-06-21 RX ORDER — DEXTROSE MONOHYDRATE 25 G/50ML
50 INJECTION, SOLUTION INTRAVENOUS
Status: CANCELLED | OUTPATIENT
Start: 2018-06-21

## 2018-06-21 RX ORDER — EPHEDRINE SULFATE 50 MG/ML
INJECTION, SOLUTION INTRAVENOUS AS NEEDED
Status: DISCONTINUED | OUTPATIENT
Start: 2018-06-21 | End: 2018-06-21 | Stop reason: SURG

## 2018-06-21 RX ORDER — SODIUM CHLORIDE, SODIUM LACTATE, POTASSIUM CHLORIDE, CALCIUM CHLORIDE 600; 310; 30; 20 MG/100ML; MG/100ML; MG/100ML; MG/100ML
INJECTION, SOLUTION INTRAVENOUS CONTINUOUS
Status: CANCELLED | OUTPATIENT
Start: 2018-06-21

## 2018-06-21 RX ORDER — SODIUM CHLORIDE, SODIUM LACTATE, POTASSIUM CHLORIDE, CALCIUM CHLORIDE 600; 310; 30; 20 MG/100ML; MG/100ML; MG/100ML; MG/100ML
INJECTION, SOLUTION INTRAVENOUS CONTINUOUS PRN
Status: DISCONTINUED | OUTPATIENT
Start: 2018-06-21 | End: 2018-06-21 | Stop reason: SURG

## 2018-06-21 RX ORDER — LIDOCAINE HYDROCHLORIDE 10 MG/ML
INJECTION, SOLUTION EPIDURAL; INFILTRATION; INTRACAUDAL; PERINEURAL AS NEEDED
Status: DISCONTINUED | OUTPATIENT
Start: 2018-06-21 | End: 2018-06-21 | Stop reason: SURG

## 2018-06-21 RX ADMIN — EPHEDRINE SULFATE 10 MG: 50 INJECTION, SOLUTION INTRAVENOUS at 14:22:00

## 2018-06-21 RX ADMIN — LIDOCAINE HYDROCHLORIDE 50 MG: 10 INJECTION, SOLUTION EPIDURAL; INFILTRATION; INTRACAUDAL; PERINEURAL at 13:59:00

## 2018-06-21 RX ADMIN — EPHEDRINE SULFATE 10 MG: 50 INJECTION, SOLUTION INTRAVENOUS at 14:31:00

## 2018-06-21 RX ADMIN — ONDANSETRON 4 MG: 2 INJECTION INTRAMUSCULAR; INTRAVENOUS at 14:40:00

## 2018-06-21 RX ADMIN — EPHEDRINE SULFATE 10 MG: 50 INJECTION, SOLUTION INTRAVENOUS at 14:15:00

## 2018-06-21 RX ADMIN — SODIUM CHLORIDE, SODIUM LACTATE, POTASSIUM CHLORIDE, CALCIUM CHLORIDE: 600; 310; 30; 20 INJECTION, SOLUTION INTRAVENOUS at 13:56:00

## 2018-06-21 NOTE — ANESTHESIA PREPROCEDURE EVALUATION
Anesthesia PreOp Note    HPI:     Ajay Díaz is a [de-identified]year old male who presents for preoperative consultation requested by: Haley Pisano MD    Date of Surgery: 6/21/2018    Procedure(s):  ENDOSCOPIC ULTRASOUND (EUS)  ESOPHAGOGASTRODUODENOSCOPY (EGD) pressure    • High cholesterol    • History of alcohol use    • History of tobacco use     quit in [address] (hard of hearing)    • Mumps    • Nephrolithiasis    • Thrombocytopenia ([address]    • Visual impairment        Past Surgical History:  2013: Inetta Board Rfl: 0 Taking   Glucose Blood In Vitro Strip TEST 1 TIME DAILY Disp: 100 strip Rfl: 3 Taking   ONETOUCH DELICA LANCETS 15U Does not apply Misc Use to test blood sugar once daily Disp: 100 each Rfl: 2 Taking   aspirin 81 MG Oral Tab Take 81 mg by mouth edwin HGB 10.4 (L) 06/02/2018   HCT 31.9 (L) 06/02/2018   MCV 78.8 (L) 06/02/2018   MCH 25.6 (L) 06/02/2018   MCHC 32.5 06/02/2018   RDW 27.0 (H) 06/02/2018   PLT 59 (L) 06/02/2018   MPV 9.2 06/02/2018       Lab Results  Component Value Date    05/13/201

## 2018-06-21 NOTE — OPERATIVE REPORT
OPERATIVE REPORT   PATIENT NAME: Shakeel Hager  MRN: [id number]  DATE OF OPERATION: 6/21/2018  PREOPERATIVE DIAGNOSIS:   Esophageal nodularity with biopsy showing high-grade dysplasia/squamous cell carcinoma in situ.   The patient was referred for endoscopic unremarkable. Of note no obvious portal hypertension changes seen in the stomach. No obvious gastric varices noted. No obvious esophageal varices seen.   The scope was then removed and the Olympus hard oblique EMR cap was fitted onto the tip of the scope

## 2018-06-21 NOTE — ANESTHESIA POSTPROCEDURE EVALUATION
Patient: Zuleima Vazquez    Procedure Summary     Date:  06/21/18 Room / Location:  [address] [place] [address] ENDOSCOPY 01 / [address] [place] [address] ENDOSCOPY    Anesthesia Start:  7310 Anesthesia Stop:  1450    Procedures:       ENDOSCOPIC ULTRASOUND (EUS) (N/A )      ESOPHAGOGASTRODUODENOSCOPY (EGD)

## 2018-06-22 VITALS
HEART RATE: 80 BPM | HEIGHT: 72 IN | TEMPERATURE: 98 F | SYSTOLIC BLOOD PRESSURE: 130 MMHG | RESPIRATION RATE: 17 BRPM | DIASTOLIC BLOOD PRESSURE: 51 MMHG | WEIGHT: 241 LBS | OXYGEN SATURATION: 93 % | BODY MASS INDEX: 32.64 KG/M2

## 2018-06-26 NOTE — PROGRESS NOTES
IMPRESSION:  1. Esophageal nodularity at 35 cm from incisors consistent with a known squamous cell high-grade dysplasia/carcinoma in situ status post endoscopic mucosal resection. RECOMMENDATIONS:  1.  Clear liquid diet for 24 hours and advance to soft m

## 2018-06-27 NOTE — PROGRESS NOTES
Results discussed with patient and wife. There was a focal invasion into the submucosa. Given his age and other comorbidities will continue watchful waiting approach. He will require upper endoscopy in 2 months for evaluation of the site.   Wife was info

## 2018-07-03 ENCOUNTER — TELEPHONE (OUTPATIENT)
Dept: GASTROENTEROLOGY | Facility: CLINIC | Age: 78
End: 2018-07-03

## 2018-07-03 NOTE — TELEPHONE ENCOUNTER
Routed to Dr. David Mejia staff as Brenna Chung-    Araceli cancelled d/t transfer of care to Dr. Franklin Smith.

## 2018-07-03 NOTE — TELEPHONE ENCOUNTER
Dr. Cintia Farfan-    Pt underwent EUS/EGS with Dr. Jose Awan on 6/21/18 and his office is also scheduling a f/u EGD in 2 months (see TE from 6/27/18). Any need for recall below, as this is already being addressed by Dr. Romain Henning staff?

## 2018-07-03 NOTE — TELEPHONE ENCOUNTER
----- Message from Kathryn Ku RN sent at 6/1/2018  5:12 PM CDT -----  Regarding: 3 month EGD f/u  Entered into EPIC:Recall EGD in 3 months per Dr. Kentrell Cevallos. Last EGD done 5/24/18, next due 8/24/2018 to f/u on gastric ulcer. Snapshot updated.

## 2018-07-11 NOTE — PROGRESS NOTES
7/10/2018  [facility]    Dear Pierce Bonilla,       Here are the biopsy/pathology findings from your recent EGD (Upper  Endoscopy):  1. Cancer cells were noted in area of nodularity in the esophagus as discussed.      Follow-

## 2018-07-17 ENCOUNTER — OFFICE VISIT (OUTPATIENT)
Dept: HEMATOLOGY/ONCOLOGY | Facility: HOSPITAL | Age: 78
End: 2018-07-17
Attending: INTERNAL MEDICINE
Payer: COMMERCIAL

## 2018-07-17 VITALS
DIASTOLIC BLOOD PRESSURE: 37 MMHG | TEMPERATURE: 98 F | BODY MASS INDEX: 33.05 KG/M2 | HEIGHT: 72 IN | WEIGHT: 244 LBS | RESPIRATION RATE: 18 BRPM | HEART RATE: 81 BPM | SYSTOLIC BLOOD PRESSURE: 144 MMHG

## 2018-07-17 DIAGNOSIS — D69.6 THROMBOCYTOPENIA (HCC): ICD-10-CM

## 2018-07-17 DIAGNOSIS — R16.2 HEPATOSPLENOMEGALY: Primary | ICD-10-CM

## 2018-07-17 DIAGNOSIS — C15.4 MALIGNANT NEOPLASM OF MIDDLE THIRD OF ESOPHAGUS (HCC): ICD-10-CM

## 2018-07-17 DIAGNOSIS — Z87.898 HISTORY OF ALCOHOL USE: ICD-10-CM

## 2018-07-17 DIAGNOSIS — Z87.891 HISTORY OF TOBACCO USE: ICD-10-CM

## 2018-07-17 PROCEDURE — 99215 OFFICE O/P EST HI 40 MIN: CPT | Performed by: INTERNAL MEDICINE

## 2018-07-17 NOTE — PROGRESS NOTES
Cancer Center Progress Note    Patient Name: Jose Guadalupe Mendiola   YOB: 1940   Medical Record Number: [id number]   CSN: [id number]   Consulting Physician: Castillo Moody MD  Referring Physician(s): Rafa Cline  Date of Visit: 7/17/2018     Bernabe arian, has an inc in his PPI; again with improved symptoms. Baseline tumor markers not elevated for this patient. His review of systems is unchanged with no new symptoms or concerns endorsed today.     Past Medical History:  Past Medical History:   Diag Smoking status: Former Smoker  2.00 Packs/day  For 40.00 Years     Types: Cigarettes    Quit date: 1/1/1998    Smokeless tobacco: Never Used    Alcohol use Yes  0.0 oz/week     Comment: Occasionally; very seldom use, previous heavy use in the past    Drug LANCETS 33G Does not apply Misc Use to test blood sugar once daily Disp: 100 each Rfl: 2   aspirin 81 MG Oral Tab Take 81 mg by mouth daily.  Disp:  Rfl:        Review of Systems:    Constitutional: Negative for anorexia, chills, fatigue, fevers, night swea Ambulatory and capable of all selfcare but unable to carry out any work activities.  Up and about more than 50% of waking hours        Labs:      Lab Results  Component Value Date/Time   WBC 4.1 06/02/2018 07:54 AM   RBC 4.05 (L) 06/02/2018 07:54 AM   HGB 1 activity. ABDOMEN/PELVIS:       The liver has increased heterogeneity but no definite lesions. The gallbladder is surgically absent. There are prominent but nonenlarged and not FDG avid gastrohepatic lymph nodes. These could be reactive in nature.   Pradip Stewart Impression:    (R16.2) Hepatosplenomegaly  (primary encounter diagnosis)  Plan: CBC WITH DIFFERENTIAL WITH PLATELET, IRON AND         TIBC, FERRITIN    (D69.6) Thrombocytopenia (HCC)  Plan: CBC WITH DIFFERENTIAL WITH PLATELET, IRON AND         TIBC setting of symptoms      2.) Thrombocytopenia    --Noted to be present for several years likely secondary to hepatosplenomegaly from heavy alcohol use in the past    3.) History of tobacco and alcohol use    --Discussed with pt and wife that these two agen

## 2018-07-18 ENCOUNTER — TELEPHONE (OUTPATIENT)
Dept: GASTROENTEROLOGY | Facility: CLINIC | Age: 78
End: 2018-07-18

## 2018-07-18 NOTE — TELEPHONE ENCOUNTER
Current Outpatient Prescriptions:  omeprazole 20 MG Oral Capsule Delayed Release Take 1 capsule (20 mg total) by mouth 2 (two) times daily before meals.  Disp: 60 capsule Rfl: 3     Refill insurance covers 90 day

## 2018-07-19 NOTE — TELEPHONE ENCOUNTER
Contacted pt's spouse, Micaela Coelho (have GUY). This rx was filled by Dr Jose Awan and in 7/3 encounter, Dr Karma Myers has transferred care to Dr Jose Awan. Spouse will contact his office for refills, but just picked some up last week.

## 2018-08-13 ENCOUNTER — HOSPITAL ENCOUNTER (INPATIENT)
Facility: HOSPITAL | Age: 78
LOS: 5 days | Discharge: HOME HEALTH | DRG: 871 | End: 2018-08-18
Attending: EMERGENCY MEDICINE | Admitting: HOSPITALIST
Payer: COMMERCIAL

## 2018-08-13 ENCOUNTER — APPOINTMENT (OUTPATIENT)
Dept: ULTRASOUND IMAGING | Facility: HOSPITAL | Age: 78
DRG: 871 | End: 2018-08-13
Attending: INTERNAL MEDICINE
Payer: COMMERCIAL

## 2018-08-13 ENCOUNTER — APPOINTMENT (OUTPATIENT)
Dept: CT IMAGING | Facility: HOSPITAL | Age: 78
DRG: 871 | End: 2018-08-13
Attending: EMERGENCY MEDICINE
Payer: COMMERCIAL

## 2018-08-13 ENCOUNTER — APPOINTMENT (OUTPATIENT)
Dept: GENERAL RADIOLOGY | Facility: HOSPITAL | Age: 78
DRG: 871 | End: 2018-08-13
Attending: EMERGENCY MEDICINE
Payer: COMMERCIAL

## 2018-08-13 ENCOUNTER — APPOINTMENT (OUTPATIENT)
Dept: ULTRASOUND IMAGING | Facility: HOSPITAL | Age: 78
DRG: 871 | End: 2018-08-13
Attending: EMERGENCY MEDICINE
Payer: COMMERCIAL

## 2018-08-13 DIAGNOSIS — K31.89 PORTAL HYPERTENSIVE GASTROPATHY (HCC): ICD-10-CM

## 2018-08-13 DIAGNOSIS — D64.9 ANEMIA, UNSPECIFIED TYPE: Primary | ICD-10-CM

## 2018-08-13 DIAGNOSIS — I85.00 ESOPHAGEAL VARICES WITHOUT BLEEDING, UNSPECIFIED ESOPHAGEAL VARICES TYPE (HCC): ICD-10-CM

## 2018-08-13 DIAGNOSIS — E86.0 DEHYDRATION: ICD-10-CM

## 2018-08-13 DIAGNOSIS — K22.2 ESOPHAGEAL STRICTURE: ICD-10-CM

## 2018-08-13 DIAGNOSIS — K76.6 PORTAL HYPERTENSIVE GASTROPATHY (HCC): ICD-10-CM
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
## 2018-08-13 PROBLEM — E87.20 METABOLIC ACIDOSIS: Status: ACTIVE | Noted: 2018-08-13

## 2018-08-13 PROBLEM — R73.9 HYPERGLYCEMIA: Status: ACTIVE | Noted: 2018-08-13

## 2018-08-13 PROBLEM — E87.2 METABOLIC ACIDOSIS: Status: ACTIVE | Noted: 2018-08-13

## 2018-08-13 LAB
ACETONE SERPL QL: NEGATIVE
ANION GAP SERPL CALC-SCNC: 22 MMOL/L (ref 0–18)
ANTIBODY SCREEN: NEGATIVE
APTT PPP: 29.9 SECONDS (ref 23.2–35.3)
BASOPHILS # BLD: 0 K/UL (ref 0–0.2)
BASOPHILS NFR BLD: 0 %
BILIRUB UR QL: NEGATIVE
BUN SERPL-MCNC: 14 MG/DL (ref 8–20)
BUN/CREAT SERPL: 14.1 (ref 10–20)
CALCIUM SERPL-MCNC: 8.8 MG/DL (ref 8.5–10.5)
CHLORIDE SERPL-SCNC: 100 MMOL/L (ref 95–110)
CHOLEST SERPL-MCNC: 168 MG/DL (ref 110–200)
CK SERPL-CCNC: 241 U/L (ref 49–397)
CO2 SERPL-SCNC: 15 MMOL/L (ref 22–32)
COLOR UR: YELLOW
CREAT SERPL-MCNC: 0.99 MG/DL (ref 0.5–1.5)
D DIMER PPP FEU-MCNC: 2.55 MCG/ML (ref ?–0.8)
EOSINOPHIL # BLD: 0 K/UL (ref 0–0.7)
EOSINOPHIL NFR BLD: 1 %
ERYTHROCYTE [DISTWIDTH] IN BLOOD BY AUTOMATED COUNT: 18.2 % (ref 11–15)
GLUCOSE BLDC GLUCOMTR-MCNC: 306 MG/DL (ref 70–99)
GLUCOSE BLDC GLUCOMTR-MCNC: 351 MG/DL (ref 70–99)
GLUCOSE BLDC GLUCOMTR-MCNC: 396 MG/DL (ref 70–99)
GLUCOSE SERPL-MCNC: 389 MG/DL (ref 70–99)
GLUCOSE UR-MCNC: >=500 MG/DL
HCT VFR BLD AUTO: 25 % (ref 41–52)
HDLC SERPL-MCNC: 25 MG/DL
HGB BLD-MCNC: 7.8 G/DL (ref 13.5–17.5)
HGB UR QL STRIP.AUTO: NEGATIVE
HYALINE CASTS #/AREA URNS AUTO: 9 /LPF
INR BLD: 1.2 (ref 0.9–1.2)
KETONES UR-MCNC: NEGATIVE MG/DL
LACTATE SERPL-SCNC: 10.4 MMOL/L (ref 0.5–2.2)
LACTATE SERPL-SCNC: 4.7 MMOL/L (ref 0.5–2.2)
LACTATE SERPL-SCNC: 5 MMOL/L (ref 0.5–2.2)
LDLC SERPL CALC-MCNC: 94 MG/DL (ref 0–99)
LYMPHOCYTES # BLD: 0.5 K/UL (ref 1–4)
LYMPHOCYTES NFR BLD: 6 %
MCH RBC QN AUTO: 25.6 PG (ref 27–32)
MCHC RBC AUTO-ENTMCNC: 31.2 G/DL (ref 32–37)
MCV RBC AUTO: 81.9 FL (ref 80–100)
MONOCYTES # BLD: 0.8 K/UL (ref 0–1)
MONOCYTES NFR BLD: 11 %
NEUTROPHILS # BLD AUTO: 5.8 K/UL (ref 1.8–7.7)
NEUTROPHILS NFR BLD: 82 %
NITRITE UR QL STRIP.AUTO: POSITIVE
NONHDLC SERPL-MCNC: 143 MG/DL
OSMOLALITY UR CALC.SUM OF ELEC: 301 MOSM/KG (ref 275–295)
PH UR: 5 [PH] (ref 5–8)
PLATELET # BLD AUTO: 119 K/UL (ref 140–400)
PMV BLD AUTO: 7.6 FL (ref 7.4–10.3)
POTASSIUM SERPL-SCNC: 3.8 MMOL/L (ref 3.3–5.1)
PROT UR-MCNC: NEGATIVE MG/DL
PROTHROMBIN TIME: 14.5 SECONDS (ref 11.8–14.5)
RBC # BLD AUTO: 3.06 M/UL (ref 4.5–5.9)
RBC #/AREA URNS AUTO: <1 /HPF
RH BLOOD TYPE: POSITIVE
SODIUM SERPL-SCNC: 137 MMOL/L (ref 136–144)
SP GR UR STRIP: 1.02 (ref 1–1.03)
TRIGL SERPL-MCNC: 244 MG/DL (ref 1–149)
TROPONIN I SERPL-MCNC: 0.07 NG/ML (ref ?–0.03)
TROPONIN I SERPL-MCNC: 0.08 NG/ML (ref ?–0.03)
TROPONIN I SERPL-MCNC: 0.1 NG/ML (ref ?–0.03)
UROBILINOGEN UR STRIP-ACNC: <2
VIT C UR-MCNC: 40 MG/DL
WBC # BLD AUTO: 7.1 K/UL (ref 4–11)
WBC #/AREA URNS AUTO: 16 /HPF

## 2018-08-13 PROCEDURE — 70450 CT HEAD/BRAIN W/O DYE: CPT | Performed by: EMERGENCY MEDICINE

## 2018-08-13 PROCEDURE — 74177 CT ABD & PELVIS W/CONTRAST: CPT | Performed by: EMERGENCY MEDICINE

## 2018-08-13 PROCEDURE — 99223 1ST HOSP IP/OBS HIGH 75: CPT | Performed by: HOSPITALIST

## 2018-08-13 PROCEDURE — 71045 X-RAY EXAM CHEST 1 VIEW: CPT | Performed by: EMERGENCY MEDICINE

## 2018-08-13 PROCEDURE — 76705 ECHO EXAM OF ABDOMEN: CPT | Performed by: INTERNAL MEDICINE

## 2018-08-13 PROCEDURE — 93971 EXTREMITY STUDY: CPT | Performed by: EMERGENCY MEDICINE

## 2018-08-13 PROCEDURE — 71260 CT THORAX DX C+: CPT | Performed by: EMERGENCY MEDICINE

## 2018-08-13 PROCEDURE — 99255 IP/OBS CONSLTJ NEW/EST HI 80: CPT | Performed by: INTERNAL MEDICINE

## 2018-08-13 PROCEDURE — 3E0F7GC INTRODUCTION OF OTHER THERAPEUTIC SUBSTANCE INTO RESPIRATORY TRACT, VIA NATURAL OR ARTIFICIAL OPENING: ICD-10-PCS | Performed by: HOSPITALIST

## 2018-08-13 RX ORDER — PRAVASTATIN SODIUM 20 MG
20 TABLET ORAL NIGHTLY
Status: DISCONTINUED | OUTPATIENT
Start: 2018-08-13 | End: 2018-08-18

## 2018-08-13 RX ORDER — SODIUM CHLORIDE 0.9 % (FLUSH) 0.9 %
3 SYRINGE (ML) INJECTION AS NEEDED
Status: DISCONTINUED | OUTPATIENT
Start: 2018-08-13 | End: 2018-08-18

## 2018-08-13 RX ORDER — SODIUM CHLORIDE 9 MG/ML
INJECTION, SOLUTION INTRAVENOUS CONTINUOUS
Status: DISCONTINUED | OUTPATIENT
Start: 2018-08-13 | End: 2018-08-14

## 2018-08-13 RX ORDER — SODIUM CHLORIDE 9 MG/ML
125 INJECTION, SOLUTION INTRAVENOUS CONTINUOUS
Status: DISCONTINUED | OUTPATIENT
Start: 2018-08-13 | End: 2018-08-13

## 2018-08-13 RX ORDER — DIPHENHYDRAMINE HCL 25 MG
25 CAPSULE ORAL ONCE
Status: COMPLETED | OUTPATIENT
Start: 2018-08-13 | End: 2018-08-13

## 2018-08-13 RX ORDER — TRIAMTERENE AND HYDROCHLOROTHIAZIDE 37.5; 25 MG/1; MG/1
1 CAPSULE ORAL DAILY
Status: DISCONTINUED | OUTPATIENT
Start: 2018-08-14 | End: 2018-08-14

## 2018-08-13 RX ORDER — ACETAMINOPHEN 325 MG/1
650 TABLET ORAL ONCE
Status: COMPLETED | OUTPATIENT
Start: 2018-08-13 | End: 2018-08-13

## 2018-08-13 RX ORDER — SODIUM CHLORIDE 9 MG/ML
INJECTION, SOLUTION INTRAVENOUS ONCE
Status: COMPLETED | OUTPATIENT
Start: 2018-08-13 | End: 2018-08-13

## 2018-08-13 RX ORDER — SODIUM CHLORIDE, SODIUM LACTATE, POTASSIUM CHLORIDE, CALCIUM CHLORIDE 600; 310; 30; 20 MG/100ML; MG/100ML; MG/100ML; MG/100ML
INJECTION, SOLUTION INTRAVENOUS CONTINUOUS
Status: DISCONTINUED | OUTPATIENT
Start: 2018-08-13 | End: 2018-08-14

## 2018-08-13 RX ORDER — ONDANSETRON 2 MG/ML
4 INJECTION INTRAMUSCULAR; INTRAVENOUS EVERY 6 HOURS PRN
Status: DISCONTINUED | OUTPATIENT
Start: 2018-08-13 | End: 2018-08-18

## 2018-08-13 RX ORDER — METOPROLOL SUCCINATE 50 MG/1
50 TABLET, EXTENDED RELEASE ORAL DAILY
Status: DISCONTINUED | OUTPATIENT
Start: 2018-08-14 | End: 2018-08-18

## 2018-08-13 RX ORDER — NALOXONE HYDROCHLORIDE 0.4 MG/ML
80 INJECTION, SOLUTION INTRAMUSCULAR; INTRAVENOUS; SUBCUTANEOUS AS NEEDED
Status: ACTIVE | OUTPATIENT
Start: 2018-08-13 | End: 2018-08-13

## 2018-08-13 RX ORDER — SODIUM CHLORIDE 0.9 % (FLUSH) 0.9 %
10 SYRINGE (ML) INJECTION AS NEEDED
Status: DISCONTINUED | OUTPATIENT
Start: 2018-08-13 | End: 2018-08-18

## 2018-08-13 RX ORDER — DEXTROSE MONOHYDRATE 25 G/50ML
50 INJECTION, SOLUTION INTRAVENOUS
Status: DISCONTINUED | OUTPATIENT
Start: 2018-08-13 | End: 2018-08-15 | Stop reason: HOSPADM

## 2018-08-13 RX ORDER — LOSARTAN POTASSIUM 50 MG/1
50 TABLET ORAL DAILY
Status: DISCONTINUED | OUTPATIENT
Start: 2018-08-14 | End: 2018-08-18

## 2018-08-13 RX ORDER — DEXTROSE MONOHYDRATE 25 G/50ML
50 INJECTION, SOLUTION INTRAVENOUS AS NEEDED
Status: DISCONTINUED | OUTPATIENT
Start: 2018-08-13 | End: 2018-08-18

## 2018-08-13 RX ORDER — ASPIRIN 81 MG/1
81 TABLET ORAL DAILY
Status: DISCONTINUED | OUTPATIENT
Start: 2018-08-14 | End: 2018-08-14

## 2018-08-13 RX ORDER — SODIUM CHLORIDE 9 MG/ML
INJECTION, SOLUTION INTRAVENOUS
Status: DISPENSED
Start: 2018-08-13 | End: 2018-08-14

## 2018-08-13 RX ORDER — PANTOPRAZOLE SODIUM 20 MG/1
20 TABLET, DELAYED RELEASE ORAL
Status: DISCONTINUED | OUTPATIENT
Start: 2018-08-14 | End: 2018-08-18

## 2018-08-13 NOTE — H&P
[facility]    PATIENT'S NAME: Mara Devlin   ATTENDING PHYSICIAN: Bobby Billingsley MD   PATIENT ACCOUNT#:   [id number]    LOCATION:  [place] [address]  MEDICAL RECORD #:   [id number]       YOB: 1940  ADMISSION DATE:       08/13/2018 no acute findings. The patient will be admitted to the hospital for further management. PAST MEDICAL HISTORY:  Diabetes mellitus type 2, hypertension, hyperlipidemia, obesity.   Imaging studies showing morphology of cirrhosis of the liver which is most thyromegaly or lymphadenopathy. LUNGS:  Clear to auscultation bilaterally. Normal respiratory effort. No intercostal retractions. HEART:  Regular rate and rhythm. S1 and S2 auscultated. No murmur. ABDOMEN:  Soft, obese. Positive bowel sounds.   No t

## 2018-08-13 NOTE — PROGRESS NOTES
[facility] Pharmacy Note: Antimicrobial Weight Dose Adjustment for: ceftriaxone (ROCEPHIN)    Wilfredo Venegas is a [de-identified]year old male who has been prescribed ceftriaxone (ROCEPHIN) 1000 mg every 24 hours.   CrCl is estimated creatinine clearance is 65.3 mL/min (based on

## 2018-08-13 NOTE — ED INITIAL ASSESSMENT (HPI)
Ems was called by pt for a lift assist. pt stated to ems he fell in his bedroom and was there for several hours. He lives alone and his family is on vacation. Ems checked blood sugar while assessing to find BS ELEVATED. PT a&o X 4.  Hx DM

## 2018-08-13 NOTE — ED PROVIDER NOTES
Patient Seen in: [facility] AND [facility] Emergency Department    History   Patient presents with:  Fall (musculoskeletal, neurologic)  Hyperglycemia (metabolic)    Stated Complaint: fall    HPI    The patient is an 77-year-old male with a past history of hyper Comment: open kidney stone removal  7/22/15: LAPAROSCOPIC CHOLECYSTECTOMY  7/22/15: NEEDLE BIOPSY LIVER  1985: REMOVAL OF KIDNEY STONE Left      Comment: Open  4/16/15: UPPER GI ENDOSCOPY,BIOPSY  No date: UPPER GI ENDOSCOPY,EXAM        Smoking status:  Form bilaterally. Patient is hard of hearing  Extremities: There is mild asymmetric swelling of the right lower extremity  Skin: There is minimal redness to the right anterior tibial area; there is no significant warmth to the touch.       ED Course     Labs Re 120                     7.0                  150                     8.0                  180             100-260       9.0                  210             130-290   TROPONIN I - Abnormal; Notable for the following:     Troponi TIME (PT) - Abnormal; Notable for the following:     PT 15.4 (*)     INR 1.3 (*)     All other components within normal limits   TROPONIN I - Abnormal; Notable for the following:     Troponin 0.09 (*)     All other components within normal limits   LACTIC components within normal limits   POCT GLUCOSE - Abnormal; Notable for the following:     POC Glucose  186 (*)     All other components within normal limits   POCT GLUCOSE - Abnormal; Notable for the following:     POC Glucose  142 (*)     All other compon following:     RBC 3.17 (*)     HGB 8.2 (*)     HCT 25.6 (*)     MCH 25.8 (*)     RDW 17.4 (*)      (*)     All other components within normal limits   CBC W/ DIFFERENTIAL - Abnormal; Notable for the following:     RBC 3.00 (*)     HGB 7.7 (*)     H CANCEL   LACTIC ACID REFLEX DO NOT CANCEL X2   LACTIC ACID RFLX DO NOT CANCEL   LACTIC ACID RFLX DO NOT CANCEL   LACTIC ACID REFLEX DO NOT CANCEL X2   CBC WITH DIFFERENTIAL WITH PLATELET    Narrative:      The following orders were created for panel order C noted on EKG Report. Rate: 107  Rhythm: Sinus Rhythm  Reading: Patient's EKG demonstrates a sinus tachycardia with a rate of 107. There is poor R-wave progression. There is first-degree AV block. There are nonspecific ST-T wave changes.   Appeared to an Unknown    Dehydration E86.0 8/13/2018     Fall, initial encounter W19. XXXA 8/13/2018     Hyperglycemia R73.9 7/65/5153 Yes    Metabolic acidosis B35.0 1/76/2525 Yes

## 2018-08-13 NOTE — CONSULTS
[facility] AND CLINICS  Report of Consultation    Hortencia Kacie Patient Status:  Inpatient    1940 MRN [id number]   Location [facility] 3W/SW Attending Onelia Hernandez MD   Hosp Day # 0 PCP Yamilet Abrams MD     Reason for Consultation:  Real Morales CHOLECYSTECTOMY  5/24/2018: COLONOSCOPY N/A      Comment: Procedure: COLONOSCOPY;  Surgeon: Bryson Mata MD;  Location: [facility] ENDOSCOPY  4/16/15: COLONOSCOPY & POLYPECTOMY  No date: KIDNEY SURGERY      Comment: open kidney stone removal (LEVEMIR) 100 UNIT/ML flextouch 20 Units, 20 Units, Subcutaneous, Nightly  •  [START ON 8/14/2018] CefTRIAXone Sodium (ROCEPHIN) 2 g in sodium chloride 0.9 % 100 mL MBP/ADD-vantage, 2 g, Intravenous, Q24H  •  Normal Saline Flush 0.9 % injection 3 mL, 3 mL, 08/13/2018    08/13/2018   CA 8.8 08/13/2018   PTT 29.9 08/13/2018   INR 1.2 08/13/2018   DDIMER 2.55 08/13/2018   TROP 0.10 08/13/2018    08/13/2018       Imaging: CT scan abdomen/pelvis  1.  No evidence of bowel obstruction, perforated viscus astigmatism and presbyopia     Type 2 diabetes mellitus without complication, without long-term current use of insulin ([address]     Essential hypertension with goal blood pressure less than 130/85     Cough     Nightmares     Diabetic retinopathy associated w

## 2018-08-14 ENCOUNTER — APPOINTMENT (OUTPATIENT)
Dept: GENERAL RADIOLOGY | Facility: HOSPITAL | Age: 78
DRG: 871 | End: 2018-08-14
Attending: HOSPITALIST
Payer: COMMERCIAL

## 2018-08-14 LAB
ALBUMIN SERPL BCP-MCNC: 3.1 G/DL (ref 3.5–4.8)
ALBUMIN/GLOB SERPL: 1 {RATIO} (ref 1–2)
ALP SERPL-CCNC: 94 U/L (ref 32–100)
ALT SERPL-CCNC: 35 U/L (ref 17–63)
ANION GAP SERPL CALC-SCNC: 7 MMOL/L (ref 0–18)
AST SERPL-CCNC: 57 U/L (ref 15–41)
BASOPHILS # BLD: 0 K/UL (ref 0–0.2)
BASOPHILS NFR BLD: 1 %
BILIRUB SERPL-MCNC: 0.8 MG/DL (ref 0.3–1.2)
BUN SERPL-MCNC: 13 MG/DL (ref 8–20)
BUN/CREAT SERPL: 17.1 (ref 10–20)
CALCIUM SERPL-MCNC: 8.2 MG/DL (ref 8.5–10.5)
CHLORIDE SERPL-SCNC: 105 MMOL/L (ref 95–110)
CO2 SERPL-SCNC: 27 MMOL/L (ref 22–32)
CREAT SERPL-MCNC: 0.76 MG/DL (ref 0.5–1.5)
EOSINOPHIL # BLD: 0.4 K/UL (ref 0–0.7)
EOSINOPHIL NFR BLD: 7 %
ERYTHROCYTE [DISTWIDTH] IN BLOOD BY AUTOMATED COUNT: 17.4 % (ref 11–15)
GLOBULIN PLAS-MCNC: 3.2 G/DL (ref 2.5–3.7)
GLUCOSE BLDC GLUCOMTR-MCNC: 164 MG/DL (ref 70–99)
GLUCOSE BLDC GLUCOMTR-MCNC: 240 MG/DL (ref 70–99)
GLUCOSE BLDC GLUCOMTR-MCNC: 253 MG/DL (ref 70–99)
GLUCOSE BLDC GLUCOMTR-MCNC: 273 MG/DL (ref 70–99)
GLUCOSE SERPL-MCNC: 143 MG/DL (ref 70–99)
HBA1C MFR BLD: 8.9 % (ref 4–6)
HCT VFR BLD AUTO: 25.6 % (ref 41–52)
HGB BLD-MCNC: 8.2 G/DL (ref 13.5–17.5)
LACTATE SERPL-SCNC: 3.5 MMOL/L (ref 0.5–2.2)
LACTATE SERPL-SCNC: 3.6 MMOL/L (ref 0.5–2.2)
LACTATE SERPL-SCNC: 3.6 MMOL/L (ref 0.5–2.2)
LACTATE SERPL-SCNC: 3.9 MMOL/L (ref 0.5–2.2)
LACTATE SERPL-SCNC: 4 MMOL/L (ref 0.5–2.2)
LACTATE SERPL-SCNC: 4.4 MMOL/L (ref 0.5–2.2)
LYMPHOCYTES # BLD: 1 K/UL (ref 1–4)
LYMPHOCYTES NFR BLD: 18 %
MCH RBC QN AUTO: 25.8 PG (ref 27–32)
MCHC RBC AUTO-ENTMCNC: 32 G/DL (ref 32–37)
MCV RBC AUTO: 80.7 FL (ref 80–100)
MONOCYTES # BLD: 0.7 K/UL (ref 0–1)
MONOCYTES NFR BLD: 13 %
NEUTROPHILS # BLD AUTO: 3.4 K/UL (ref 1.8–7.7)
NEUTROPHILS NFR BLD: 61 %
OSMOLALITY UR CALC.SUM OF ELEC: 291 MOSM/KG (ref 275–295)
PLATELET # BLD AUTO: 101 K/UL (ref 140–400)
PMV BLD AUTO: 8.2 FL (ref 7.4–10.3)
POTASSIUM SERPL-SCNC: 3.9 MMOL/L (ref 3.3–5.1)
PROT SERPL-MCNC: 6.3 G/DL (ref 5.9–8.4)
RBC # BLD AUTO: 3.17 M/UL (ref 4.5–5.9)
SODIUM SERPL-SCNC: 139 MMOL/L (ref 136–144)
TROPONIN I SERPL-MCNC: 0.21 NG/ML (ref ?–0.03)
WBC # BLD AUTO: 5.6 K/UL (ref 4–11)

## 2018-08-14 PROCEDURE — 30233N1 TRANSFUSION OF NONAUTOLOGOUS RED BLOOD CELLS INTO PERIPHERAL VEIN, PERCUTANEOUS APPROACH: ICD-10-PCS | Performed by: HOSPITALIST

## 2018-08-14 PROCEDURE — 99232 SBSQ HOSP IP/OBS MODERATE 35: CPT | Performed by: INTERNAL MEDICINE

## 2018-08-14 PROCEDURE — 99233 SBSQ HOSP IP/OBS HIGH 50: CPT | Performed by: HOSPITALIST

## 2018-08-14 PROCEDURE — 71045 X-RAY EXAM CHEST 1 VIEW: CPT | Performed by: HOSPITALIST

## 2018-08-14 RX ORDER — SODIUM CHLORIDE 9 MG/ML
INJECTION, SOLUTION INTRAVENOUS CONTINUOUS
Status: DISCONTINUED | OUTPATIENT
Start: 2018-08-14 | End: 2018-08-15

## 2018-08-14 RX ORDER — FUROSEMIDE 10 MG/ML
40 INJECTION INTRAMUSCULAR; INTRAVENOUS ONCE
Status: COMPLETED | OUTPATIENT
Start: 2018-08-14 | End: 2018-08-14

## 2018-08-14 RX ORDER — IPRATROPIUM BROMIDE AND ALBUTEROL SULFATE 2.5; .5 MG/3ML; MG/3ML
3 SOLUTION RESPIRATORY (INHALATION)
Status: DISCONTINUED | OUTPATIENT
Start: 2018-08-14 | End: 2018-08-15

## 2018-08-14 NOTE — TREATMENT PLAN
Dr Merry May informed of troponin 0.21. Client communicates no chest pain. This RN will continue to monitor client.

## 2018-08-14 NOTE — PROGRESS NOTES
[facility] Dosing Service  Antibiotic Dosing    Catahoula Pod is a [de-identified]year old male for whom pharmacy is dosing Zosyn for treatment of  sepsis. .  Other antibiotics (Not dosed by pharmacy): none    Allergies: has No Known Allergies.     Vitals: /85 (B

## 2018-08-14 NOTE — PROGRESS NOTES
[facility] AND CLINICS  Progress Note    Leonardo Dominique Patient Status:  Inpatient    1940 MRN [id number]   Location [facility] 3W/SW Attending Eduardo Walton MD   Hosp Day # 1 PCP Rubina Tucker MD     Subjective:  Leonardo Dominique is a(n) [de-identified] ye Anemia, unspecified type     Fall, initial encounter     Dehydration    The patient is an 18-year-old male who was admitted with weakness. He seems to be better today.   His hemoglobin has increased after transfusion and there is no evidence of active erika

## 2018-08-14 NOTE — PAYOR COMM NOTE
ADMISSION REVIEW     Payor: [facility]  Subscriber #:  [id number]  Authorization Number: [id number]    Admit date: 8/13/18  Admit time: 0       Admitting Physician: Erick Allen MD  Attending Physician:  Ghazal Ferrari MD  Primary C HISTORY OF PRESENT ILLNESS:  The patient is an 13-year-old  male who felt very weak this morning and was able to drag himself to call the paramedics to bring him to the emergency department for evaluation.   He felt lower back and lower abdominal d PAST MEDICAL HISTORY:  Diabetes mellitus type 2, hypertension, hyperlipidemia, obesity. Imaging studies showing morphology of cirrhosis of the liver which is most likely related to nonalcoholic hepatic steatosis.   Recent EGD, was diagnosed with esophageal LUNGS:  Clear to auscultation bilaterally. Normal respiratory effort. No intercostal retractions. HEART:  Regular rate and rhythm. S1 and S2 auscultated. No murmur. ABDOMEN:  Soft, obese. Positive bowel sounds. No tenderness.   Discomfort to [employer] Date Action Dose Route User    8/13/2018 2000 Given 25 mg Oral Christiano Theodora RN      Insulin Aspart Pen (NOVOLOG) 100 UNIT/ML flexpen 1-7 Units     Date Action Dose Route User    8/14/2018 1323 Given 4 Units Subcutaneous (Left Lower Abdomen) Crystal Bourgeois Date Action Dose Route User    8/14/2018 0827 Given 1 capsule Oral Irving Jefferson RN          Consults signed by Smita Austin MD at 8/13/2018  5:44 PM     Author: Smita Austin MD Service: Gastroenterology Author Type: Physician   Filed: 8/13/ • Hearing impairment     • Hearing loss     • Hepatitis     • Hepatosplenomegaly     • High blood pressure     • High cholesterol     • History of alcohol use     • History of tobacco use       quit in 1998   • [place] (hard of hearing)     • Mumps     • Nephr •  acetaminophen (TYLENOL) tab 650 mg, 650 mg, Oral, Once  •  diphenhydrAMINE (BENADRYL) cap/tab 25 mg, 25 mg, Oral, Once  •  dextrose 50 % injection 50 mL, 50 mL, Intravenous, PRN  •  Glucose-Vitamin C (DEX-4) 4-0.006 g chewable tab 4 tablet, 4 tablet, Or Abdomen: Soft but distended, tender to palp in mid abdomen, reducible umbilical hernia. Back: No CVA tenderness. Extremities: No edema, cyanosis, or clubbing. Skin: Warm and dry. Rectal: per ER, heme positive light brown /tan stool.    Laboratory Data 9. Cirrhosis. Mild splenomegaly. Dedicated gastroenterology assessment and AFP correlation is recommended. 10. Post cholecystectomy. No significant biliary ductal dilation. 11. Chronic L1 vertebral body compression fracture.   12. Lesser incidental findin We will follow the patient on antibiotics, his lactic acid level is improving. Further workup and evaluation per clinical course.     Ariane Thornton  8/13/2018  5:36 PM

## 2018-08-14 NOTE — PROGRESS NOTES
[facility]    Progress Note    Kj Manpreetsara Patient Status:  Inpatient    1940 MRN [id number]   Location [facility] 3W/SW Attending Josefa Bhat MD   Hosp Day # 1 PCP Godfrey Starkey MD       Subjective:     Pt says h findings as above.      Dictated by (CST): Codey Plummer MD on 8/13/2018 at 13:03     Approved by (CST): Codey Plummer MD on 8/13/2018 at 13:07          Us Venous Doppler Leg Right - Diag Img (cpt=93971)    Result Date: 8/13/2018  CONCLUSION: Normal exa on 8/13/2018 at 19:45     Approved by (CST): Charito Holliday MD on 8/13/2018 at 19:46          Ct Abdomen Pelvis Iv Contrast, No Oral (er)    Result Date: 8/13/2018  CONCLUSION:  1.  No evidence of bowel obstruction, perforated viscus, or drainable intra-abd pulmonary disease consider old anterior infarct. Low voltage -possible pulmonary disease.  ABNORMAL When compared with ECG of 05/12/2018 14:27:07 Electronically signed on 08/13/2018 at 17:36 by Javier Gunter MD      Assessment and Plan:     Sepsis due to

## 2018-08-14 NOTE — CONSULTS
[facility]    Cardiology Consultation  [facility]    Shakeel Sanches Patient Status:  Inpatient    1940 MRN [id number]   Location [facility] 3W/SW Attending Devante Rodriguez, [address] [facility] Se Day # 1 PCP Lena Terry. COLONOSCOPY;  Surgeon: Pau Fuller MD;  Location: [address] [place] [address] ENDOSCOPY  4/16/15: COLONOSCOPY & POLYPECTOMY  No date: KIDNEY SURGERY      Comment: open kidney stone removal  7/22/15: LAPAROSCOPIC CHOLECYSTECTOMY  7/22/15: NEEDLE BIOPSY LIVER UNIT/ML flextouch 20 Units 20 Units Subcutaneous Nightly   Normal Saline Flush 0.9 % injection 3 mL 3 mL Intravenous PRN   ondansetron HCl (ZOFRAN) injection 4 mg 4 mg Intravenous Q6H PRN   glucose (DEX4) oral liquid 15 g 15 g Oral Q15 Min PRN   Or      Gl Glucose Blood In Vitro Strip TEST 1 TIME DAILY   ONETOUCH DELICA LANCETS 91Q Does not apply Misc Use to test blood sugar once daily       Allergies  No Known Allergies    Review of Systems:   ROS      Physical Exam:   Blood pressure 157/85, pulse 90, tem 08/14/2018   CO2 27 08/14/2018    (H) 08/14/2018   CA 8.2 (L) 08/14/2018   ALB 3.1 (L) 08/14/2018   ALKPHO 94 08/14/2018   TP 6.3 08/14/2018   AST 57 (H) 08/14/2018   ALT 35 08/14/2018   PTT 29.9 08/13/2018   INR 1.2 08/13/2018   PTP 14.5 08/13/2018

## 2018-08-14 NOTE — TREATMENT PLAN
This RN spoke with Dr Chary Mcclain regarding client current status SOB, sats mid 80's on RA, tachypneic   client placed on 3L NC sats 93-94%. Client communicate he feels better but breathing continues to be labored. Please see orders.  This RN will continue to

## 2018-08-15 ENCOUNTER — ANESTHESIA EVENT (OUTPATIENT)
Dept: ENDOSCOPY | Facility: HOSPITAL | Age: 78
DRG: 871 | End: 2018-08-15
Payer: COMMERCIAL

## 2018-08-15 ENCOUNTER — ANESTHESIA (OUTPATIENT)
Dept: ENDOSCOPY | Facility: HOSPITAL | Age: 78
DRG: 871 | End: 2018-08-15
Payer: COMMERCIAL

## 2018-08-15 LAB
ALBUMIN SERPL BCP-MCNC: 3 G/DL (ref 3.5–4.8)
ALP SERPL-CCNC: 86 U/L (ref 32–100)
ALT SERPL-CCNC: 36 U/L (ref 17–63)
AMMONIA PLAS-MCNC: 37 UG/DL (ref 28.2–80.4)
ANION GAP SERPL CALC-SCNC: 11 MMOL/L (ref 0–18)
AST SERPL-CCNC: 61 U/L (ref 15–41)
BASOPHILS # BLD: 0 K/UL (ref 0–0.2)
BASOPHILS NFR BLD: 1 %
BILIRUB DIRECT SERPL-MCNC: 0.2 MG/DL (ref 0–0.2)
BILIRUB SERPL-MCNC: 0.8 MG/DL (ref 0.3–1.2)
BLOOD TYPE BARCODE: 5100
BUN SERPL-MCNC: 10 MG/DL (ref 8–20)
BUN/CREAT SERPL: 12 (ref 10–20)
CALCIUM SERPL-MCNC: 8.4 MG/DL (ref 8.5–10.5)
CHLORIDE SERPL-SCNC: 101 MMOL/L (ref 95–110)
CO2 SERPL-SCNC: 26 MMOL/L (ref 22–32)
CREAT SERPL-MCNC: 0.83 MG/DL (ref 0.5–1.5)
EOSINOPHIL # BLD: 0.4 K/UL (ref 0–0.7)
EOSINOPHIL NFR BLD: 8 %
ERYTHROCYTE [DISTWIDTH] IN BLOOD BY AUTOMATED COUNT: 17.6 % (ref 11–15)
GLUCOSE BLDC GLUCOMTR-MCNC: 127 MG/DL (ref 70–99)
GLUCOSE BLDC GLUCOMTR-MCNC: 135 MG/DL (ref 70–99)
GLUCOSE BLDC GLUCOMTR-MCNC: 171 MG/DL (ref 70–99)
GLUCOSE BLDC GLUCOMTR-MCNC: 186 MG/DL (ref 70–99)
GLUCOSE SERPL-MCNC: 119 MG/DL (ref 70–99)
HCT VFR BLD AUTO: 24.1 % (ref 41–52)
HGB BLD-MCNC: 7.7 G/DL (ref 13.5–17.5)
INR BLD: 1.3 (ref 0.9–1.2)
LACTATE SERPL-SCNC: 1.8 MMOL/L (ref 0.5–2.2)
LACTATE SERPL-SCNC: 2.5 MMOL/L (ref 0.5–2.2)
LACTATE SERPL-SCNC: 3.3 MMOL/L (ref 0.5–2.2)
LYMPHOCYTES # BLD: 0.9 K/UL (ref 1–4)
LYMPHOCYTES NFR BLD: 17 %
MCH RBC QN AUTO: 25.8 PG (ref 27–32)
MCHC RBC AUTO-ENTMCNC: 32.1 G/DL (ref 32–37)
MCV RBC AUTO: 80.2 FL (ref 80–100)
MONOCYTES # BLD: 0.7 K/UL (ref 0–1)
MONOCYTES NFR BLD: 13 %
NEUTROPHILS # BLD AUTO: 3.3 K/UL (ref 1.8–7.7)
NEUTROPHILS NFR BLD: 62 %
OSMOLALITY UR CALC.SUM OF ELEC: 286 MOSM/KG (ref 275–295)
PLATELET # BLD AUTO: 107 K/UL (ref 140–400)
PMV BLD AUTO: 8 FL (ref 7.4–10.3)
POTASSIUM SERPL-SCNC: 3.7 MMOL/L (ref 3.3–5.1)
PROT SERPL-MCNC: 6.1 G/DL (ref 5.9–8.4)
PROTHROMBIN TIME: 15.4 SECONDS (ref 11.8–14.5)
RBC # BLD AUTO: 3 M/UL (ref 4.5–5.9)
SODIUM SERPL-SCNC: 138 MMOL/L (ref 136–144)
TROPONIN I SERPL-MCNC: 0.09 NG/ML (ref ?–0.03)
WBC # BLD AUTO: 5.4 K/UL (ref 4–11)

## 2018-08-15 PROCEDURE — 43235 EGD DIAGNOSTIC BRUSH WASH: CPT | Performed by: INTERNAL MEDICINE

## 2018-08-15 PROCEDURE — 99233 SBSQ HOSP IP/OBS HIGH 50: CPT | Performed by: HOSPITALIST

## 2018-08-15 PROCEDURE — 0DJ08ZZ INSPECTION OF UPPER INTESTINAL TRACT, VIA NATURAL OR ARTIFICIAL OPENING ENDOSCOPIC: ICD-10-PCS | Performed by: INTERNAL MEDICINE

## 2018-08-15 RX ORDER — ZIPRASIDONE MESYLATE 20 MG/ML
10 INJECTION, POWDER, LYOPHILIZED, FOR SOLUTION INTRAMUSCULAR ONCE
Status: COMPLETED | OUTPATIENT
Start: 2018-08-15 | End: 2018-08-15

## 2018-08-15 RX ORDER — IPRATROPIUM BROMIDE AND ALBUTEROL SULFATE 2.5; .5 MG/3ML; MG/3ML
3 SOLUTION RESPIRATORY (INHALATION)
Status: DISCONTINUED | OUTPATIENT
Start: 2018-08-15 | End: 2018-08-16

## 2018-08-15 RX ORDER — POTASSIUM CHLORIDE 20 MEQ/1
40 TABLET, EXTENDED RELEASE ORAL ONCE
Status: COMPLETED | OUTPATIENT
Start: 2018-08-15 | End: 2018-08-15

## 2018-08-15 RX ORDER — SODIUM CHLORIDE, SODIUM LACTATE, POTASSIUM CHLORIDE, CALCIUM CHLORIDE 600; 310; 30; 20 MG/100ML; MG/100ML; MG/100ML; MG/100ML
INJECTION, SOLUTION INTRAVENOUS CONTINUOUS PRN
Status: DISCONTINUED | OUTPATIENT
Start: 2018-08-15 | End: 2018-08-15 | Stop reason: SURG

## 2018-08-15 RX ORDER — LIDOCAINE HYDROCHLORIDE 10 MG/ML
INJECTION, SOLUTION EPIDURAL; INFILTRATION; INTRACAUDAL; PERINEURAL AS NEEDED
Status: DISCONTINUED | OUTPATIENT
Start: 2018-08-15 | End: 2018-08-15 | Stop reason: SURG

## 2018-08-15 RX ADMIN — LIDOCAINE HYDROCHLORIDE 50 MG: 10 INJECTION, SOLUTION EPIDURAL; INFILTRATION; INTRACAUDAL; PERINEURAL at 16:48:00

## 2018-08-15 RX ADMIN — SODIUM CHLORIDE, SODIUM LACTATE, POTASSIUM CHLORIDE, CALCIUM CHLORIDE: 600; 310; 30; 20 INJECTION, SOLUTION INTRAVENOUS at 16:46:00

## 2018-08-15 RX ADMIN — SODIUM CHLORIDE, SODIUM LACTATE, POTASSIUM CHLORIDE, CALCIUM CHLORIDE: 600; 310; 30; 20 INJECTION, SOLUTION INTRAVENOUS at 16:59:00

## 2018-08-15 NOTE — PROGRESS NOTES
[facility]    Progress Note    Refugio Dickey Patient Status:  Inpatient    1940 MRN [id number]   Location [facility] 3W/SW Attending Reyes López MD   Hosp Day # 2 PCP Kedar Nettles MD         Assessment and Plan:     Cesar Altamirano lesions              Scheduled Meds:    • ipratropium-albuterol  3 mL Nebulization Q4H WA (5 times daily)   • piperacillin-tazobactam  4.5 g Intravenous Q8H   • insulin detemir  20 Units Subcutaneous Nightly   • Losartan Potassium  50 mg Oral Daily   • Met 8/13/2018 at 13:15     Approved by (CST): Lisandra Earl MD on 8/13/2018 at 13:16          Xr Chest Ap Portable  (cpt=71045)    Result Date: 8/14/2018  CONCLUSION:  1. Mild CHF.      Dictated by (CST): Kaylie Moreland MD on 8/14/2018 at 15:39     Ap abdominal CT is recommended to assess for stability/resolution. Additional borderline enlarged retroperitoneal lymph nodes which were not hypermetabolic on 7/42/0104 PET scan can also be reassessed at the time of anticipated followup imaging.  4. Small hiat

## 2018-08-15 NOTE — PROGRESS NOTES
[facility] HOSP - [facility]  Progress Note     Nubia Vicky  : 1940    Status: Inpatient  Day #: 2    Attending: Aureliano Del Cid MD  PCP: Helane Lundborg, MD      Assessment and Plan     Sepsis due to UTI  - urine culture - +e coli.  Sensitivities revie Recent Labs   Lab  08/13/18   1017  08/14/18   0412  08/15/18   0529   WBC  7.1  5.6  5.4   HGB  7.8*  8.2*  7.7*   HCT  25.0*  25.6*  24.1*   PLT  119*  101*  107*   RBC  3.06*  3.17*  3.00*   MCV  81.9  80.7  80.2   MCH  25.6*  25.8*  25.8TRI [facility] 13:07          Us Venous Doppler Leg Right - Diag Img (cpt=93971)    Result Date: 8/13/2018  CONCLUSION: Normal examination.       Dictated by (CST): Trevin Garcia MD on 8/13/2018 at 13:15     Approved by (CST): Trevin Garcia MD on 8/13/2018 at 13:16 (yuki mesentery) with borderline enlarged central mesenteric lymph nodes. This may be infectious/inflammatory or less likely neoplastic in nature. Six-month followup abdominal CT is recommended to assess for stability/resolution.  Additional borderline enl counseling coordinating care. Discussed diagnosis and plan of care.       Cee Copeland MD

## 2018-08-15 NOTE — ANESTHESIA PREPROCEDURE EVALUATION
Anesthesia PreOp Note    HPI:     Jackie Magallanes is a [de-identified]year old male who presents for preoperative consultation requested by: Jones Bosworth, MD    Date of Surgery: 8/13/2018 - 8/15/2018    Procedure(s):  ESOPHAGOGASTRODUODENOSCOPY (EGD)  Indication: Olga Chan • Colon polyp    • Diabetes [facility])    • Esophageal cancer (HCC)    • Esophageal varices (HCC)    • Essential hypertension    • Hearing impairment    • Hearing loss    • Hepatitis    • Hepatosplenomegaly    • High blood pressure    • High cholesterol    • Disp: 90 tablet Rfl: 3 8/13/2018 at Unknown time   Metoprolol Succinate ER 50 MG Oral Tablet 24 Hr Take 1 tablet (50 mg total) by mouth daily.  Disp: 90 tablet Rfl: 3 8/13/2018 at Unknown time   glimepiride 4 MG Oral Tab Take 1 tablet (4 mg total) by mouth CRNA    Or        Glucose-Vitamin C (DEX-4) 4-0.006 g chewable tab 4 tablet 4 tablet Oral Q15 Min PRN Duheric, Jasminka, CRNA    Or        dextrose 50 % injection 50 mL 50 mL Intravenous Q15 Min PRN Duheric, Jasminka, CRNA    Or        glucose (DEX4) oral previous heavy use in the past    Drug use: No    Sexual activity: Not on file     Other Topics Concern    Caffeine Concern Yes    Comment: 3 cups of coffee daily      Social History Narrative    \"Brian\" is  to wife, David gaffney, x 30yrs.  He has 5 exam  Exercise tolerance: good    Neuro/Psych - negative ROS     GI/Hepatic/Renal - negative ROS     Endo/Other - negative ROS   Abdominal              Anesthesia Plan:   ASA:  3  Plan:   MAC  Post-op Pain Management: IV analgesics      I have informed Gyu

## 2018-08-15 NOTE — ANESTHESIA POSTPROCEDURE EVALUATION
Patient: Amairani Pod    Procedure Summary     Date:  08/15/18 Room / Location:  [address] [place] [address] ENDOSCOPY 05 / [address] [place] [address] ENDOSCOPY    Anesthesia Start:  3413 Anesthesia Stop:  1360    Procedure:  ESOPHAGOGASTRODUODENOSCOPY (EGD) (N/A ) Diagnosis:  (GRADE II VARIES; NO RED W

## 2018-08-15 NOTE — PROGRESS NOTES
[facility] Dosing Service  Antibiotic Dosing    Joseph Gregory is a [de-identified]year old male for whom pharmacy is dosing Zosyn  for treatment of  sepsis. .  Other antibiotics (Not dosed by pharmacy): none    Allergies: has No Known Allergies.     Vitals: /46 (

## 2018-08-15 NOTE — PROGRESS NOTES
08/15/18 1345   Clinical Encounter Type   Visited With Patient   Routine Visit Introduction   Continue Visiting Yes   Patient's Supportive Strategies/Resources family   Patient Spiritual Encounters   Spiritual Assessment Completed Yes  (Pt talked about

## 2018-08-15 NOTE — CONSULTS
[facility] ID CONSULT NOTE    Jose Miguel Franc Patient Status:  Inpatient    1940 MRN [id number]   Location [facility] 3W/SW Attending Karla Kamara MD   Hosp Day # 2 PCP Mercedes Nunez MD       Reason for [facility] Past Surgical History:  2013: CATARACT EXTRACTION Bilateral      Comment: Dr. Amalia Pham  No date: CHOLECYSTECTOMY  5/24/2018: COLONOSCOPY N/A      Comment: Procedure: COLONOSCOPY;  Surgeon: Sherley Issa MD;  Location: [address] [place] [address] ENDOSCOPY  4/1 Flush 0.9 % injection 3 mL, 3 mL, Intravenous, PRN  •  ondansetron HCl (ZOFRAN) injection 4 mg, 4 mg, Intravenous, Q6H PRN  •  glucose (DEX4) oral liquid 15 g, 15 g, Oral, Q15 Min PRN **OR** Glucose-Vitamin C (DEX-4) 4-0.006 g chewable tab 4 tablet, 4 tabl membranes. PERRLA  Neck: No lymphadenopathy. Supple. Cardiovascular: Regular rate and rhythm  Respiratory: Lungs CTAB. No wheezes. No rhonchi. Abdomen: Soft, obese, no masses  Musculoskeletal: No edema noted  Integument: No lesions. No erythema.   Lines any abdominal pain, no SOB, no dysuria. Has been afebrile. Lactate still elevated at 2.5 on 8/14. Ceftriaxone switched to zosyn. Lactate 1.8 this AM. Afebrile. # Lactic acidosis with pyuria, E.  Coli pyelonephritis, CT abd/pelvis with L periureteric stra

## 2018-08-15 NOTE — H&P
History & Physical Examination    Patient Name: Leonardo Dominique  MRN: [id number]  [facility]: [id number]  YOB: 1940    Diagnosis: anemia, rule out upper gi bleed    Prescriptions Prior to Admission:  METFORMIN HCL 1000 MG Oral Tab TAKE 1 TABLET (1,000 M Medications:  ipratropium-albuterol (DUONEB) nebulizer solution 3 mL 3 mL Nebulization Q4H WA (4 times daily)   0.9%  NaCl infusion  Intravenous Continuous   Piperacillin Sod-Tazobactam So (ZOSYN) 4.5 g in dextrose 5 % 100 mL ADD-vantage 4.5 g Intravenous varices ([facility] [address]    • Essential hypertension    • Hearing impairment    • Hearing loss    • Hepatitis    • Hepatosplenomegaly    • High blood pressure    • High cholesterol    • History of alcohol use    • History of tobacco use     quit in 1998   • SUNI (hard with the patient/family. They understand and agree to proceed with plan of care. [ x ] I have reviewed the History and Physical done within the last 30 days. Any changes noted above. Heather Thornton  8/15/2018  4:50 PM

## 2018-08-15 NOTE — OPERATIVE REPORT
[facility] Endoscopy Report      Preoperative Diagnosis:  - anemia  - rule out GI bleeding      Postoperative Diagnosis:  - esophageal varices x 2 columns - grade 2   - esophageal stricture at 35 cm   - portal gastropathy  - no gastric vari program  - Advance aida Camilo.  Everette Seals MD  8/15/2018  5:00 PM

## 2018-08-16 ENCOUNTER — MED REC SCAN ONLY (OUTPATIENT)
Dept: GASTROENTEROLOGY | Facility: CLINIC | Age: 78
End: 2018-08-16

## 2018-08-16 ENCOUNTER — APPOINTMENT (OUTPATIENT)
Dept: CV DIAGNOSTICS | Facility: HOSPITAL | Age: 78
DRG: 871 | End: 2018-08-16
Attending: INTERNAL MEDICINE
Payer: COMMERCIAL

## 2018-08-16 LAB
ALBUMIN SERPL BCP-MCNC: 2.9 G/DL (ref 3.5–4.8)
ALBUMIN/GLOB SERPL: 0.9 {RATIO} (ref 1–2)
ALP SERPL-CCNC: 95 U/L (ref 32–100)
ALT SERPL-CCNC: 36 U/L (ref 17–63)
ANION GAP SERPL CALC-SCNC: 9 MMOL/L (ref 0–18)
AST SERPL-CCNC: 66 U/L (ref 15–41)
BASOPHILS # BLD: 0 K/UL (ref 0–0.2)
BASOPHILS NFR BLD: 1 %
BILIRUB SERPL-MCNC: 0.9 MG/DL (ref 0.3–1.2)
BUN SERPL-MCNC: 8 MG/DL (ref 8–20)
BUN/CREAT SERPL: 10.4 (ref 10–20)
CALCIUM SERPL-MCNC: 8.2 MG/DL (ref 8.5–10.5)
CHLORIDE SERPL-SCNC: 105 MMOL/L (ref 95–110)
CO2 SERPL-SCNC: 24 MMOL/L (ref 22–32)
CREAT SERPL-MCNC: 0.77 MG/DL (ref 0.5–1.5)
EOSINOPHIL # BLD: 0.3 K/UL (ref 0–0.7)
EOSINOPHIL NFR BLD: 7 %
ERYTHROCYTE [DISTWIDTH] IN BLOOD BY AUTOMATED COUNT: 17.3 % (ref 11–15)
GLOBULIN PLAS-MCNC: 3.2 G/DL (ref 2.5–3.7)
GLUCOSE BLDC GLUCOMTR-MCNC: 142 MG/DL (ref 70–99)
GLUCOSE BLDC GLUCOMTR-MCNC: 218 MG/DL (ref 70–99)
GLUCOSE BLDC GLUCOMTR-MCNC: 243 MG/DL (ref 70–99)
GLUCOSE BLDC GLUCOMTR-MCNC: 293 MG/DL (ref 70–99)
GLUCOSE SERPL-MCNC: 135 MG/DL (ref 70–99)
HCT VFR BLD AUTO: 25 % (ref 41–52)
HGB BLD-MCNC: 7.9 G/DL (ref 13.5–17.5)
LYMPHOCYTES # BLD: 0.5 K/UL (ref 1–4)
LYMPHOCYTES NFR BLD: 14 %
MCH RBC QN AUTO: 25.5 PG (ref 27–32)
MCHC RBC AUTO-ENTMCNC: 31.6 G/DL (ref 32–37)
MCV RBC AUTO: 80.6 FL (ref 80–100)
MONOCYTES # BLD: 0.6 K/UL (ref 0–1)
MONOCYTES NFR BLD: 15 %
NEUTROPHILS # BLD AUTO: 2.4 K/UL (ref 1.8–7.7)
NEUTROPHILS NFR BLD: 63 %
OSMOLALITY UR CALC.SUM OF ELEC: 286 MOSM/KG (ref 275–295)
PLATELET # BLD AUTO: 98 K/UL (ref 140–400)
PMV BLD AUTO: 7.7 FL (ref 7.4–10.3)
POTASSIUM SERPL-SCNC: 3.7 MMOL/L (ref 3.3–5.1)
POTASSIUM SERPL-SCNC: 3.7 MMOL/L (ref 3.3–5.1)
PROT SERPL-MCNC: 6.1 G/DL (ref 5.9–8.4)
RBC # BLD AUTO: 3.1 M/UL (ref 4.5–5.9)
SODIUM SERPL-SCNC: 138 MMOL/L (ref 136–144)
WBC # BLD AUTO: 3.8 K/UL (ref 4–11)

## 2018-08-16 PROCEDURE — 99233 SBSQ HOSP IP/OBS HIGH 50: CPT | Performed by: HOSPITALIST

## 2018-08-16 PROCEDURE — 93306 TTE W/DOPPLER COMPLETE: CPT | Performed by: INTERNAL MEDICINE

## 2018-08-16 PROCEDURE — 99232 SBSQ HOSP IP/OBS MODERATE 35: CPT | Performed by: INTERNAL MEDICINE

## 2018-08-16 PROCEDURE — B246YZZ ULTRASONOGRAPHY OF RIGHT AND LEFT HEART USING OTHER CONTRAST: ICD-10-PCS | Performed by: INTERNAL MEDICINE

## 2018-08-16 RX ORDER — HALOPERIDOL 5 MG/ML
2 INJECTION INTRAMUSCULAR EVERY 6 HOURS PRN
Status: DISCONTINUED | OUTPATIENT
Start: 2018-08-16 | End: 2018-08-18

## 2018-08-16 RX ORDER — POTASSIUM CHLORIDE 20 MEQ/1
40 TABLET, EXTENDED RELEASE ORAL ONCE
Status: COMPLETED | OUTPATIENT
Start: 2018-08-16 | End: 2018-08-16

## 2018-08-16 RX ORDER — HALOPERIDOL 5 MG/ML
5 INJECTION INTRAMUSCULAR ONCE
Status: COMPLETED | OUTPATIENT
Start: 2018-08-16 | End: 2018-08-16

## 2018-08-16 RX ORDER — SODIUM CHLORIDE 9 MG/ML
INJECTION, SOLUTION INTRAVENOUS
Status: COMPLETED
Start: 2018-08-16 | End: 2018-08-16

## 2018-08-16 RX ORDER — 0.9 % SODIUM CHLORIDE 0.9 %
VIAL (ML) INJECTION
Status: COMPLETED
Start: 2018-08-16 | End: 2018-08-16

## 2018-08-16 RX ORDER — IPRATROPIUM BROMIDE AND ALBUTEROL SULFATE 2.5; .5 MG/3ML; MG/3ML
3 SOLUTION RESPIRATORY (INHALATION)
Status: DISCONTINUED | OUTPATIENT
Start: 2018-08-16 | End: 2018-08-18

## 2018-08-16 RX ORDER — IPRATROPIUM BROMIDE AND ALBUTEROL SULFATE 2.5; .5 MG/3ML; MG/3ML
3 SOLUTION RESPIRATORY (INHALATION) EVERY 6 HOURS PRN
Status: DISCONTINUED | OUTPATIENT
Start: 2018-08-16 | End: 2018-08-18

## 2018-08-16 NOTE — PROGRESS NOTES
[facility]  Progress Note    Omid Hopper Patient Status:  Inpatient    1940 MRN [id number]   Location [facility] 3W/SW Attending Ruben Moon MD   Hosp Day # 3 PCP Lino Simmons MD     Subjective:  Omid Hopper is a(n) [de-identified] ye was admitted with weakness. The patient has an anemia which is likely multifactorial.  Upper endoscopy yesterday did show esophageal varices but no evidence or stigmata of bleeding. He was not banded.      I have advised strict alcohol abstinence, monitor

## 2018-08-16 NOTE — TRANSITION NOTE
History of Present Illness:   Patient is a [de-identified]year old male who was admitted to the hospital for Anemia, unspecified type: This is an 77-year-old man who was admitted to the hospital with a urinary tract infection and possibly early sepsis.   He has rem Sodium  20 mg Oral QAM AC   • Pravastatin Sodium  20 mg Oral Nightly   • Insulin Aspart Pen  1-7 Units Subcutaneous TID CC and HS

## 2018-08-16 NOTE — CM/SW NOTE
MIRANDA received for rehab placement. SW met with the pt who states he lives with his wife in a 1st floor apartment. Pt states his son recently bought him a walker for outside usage, but he hadn't used it much. Pt admitted to falling at home.  No breathing equi

## 2018-08-16 NOTE — PROGRESS NOTES
[facility]    Cardiology Progress Note  [facility]    Pennie Hutchison Patient Status:  Inpatient    1940 MRN [id number]   Location [facility] 3W/SW Attending Panchito Pina MD   Hosp Day # 3 PCP Mark Daley 08/16/2018   BILT 0.9 08/16/2018   TP 6.1 08/16/2018   AST 66 (H) 08/16/2018   ALT 36 08/16/2018   PTT 29.9 08/13/2018   INR 1.3 (H) 08/15/2018   PT 13.1 05/06/2015   TSH 4.78 06/02/2018   PSA 0.6 06/02/2018   DDIMER 2.55 (H) 08/13/2018   MG 1.5 (L) 05/13/

## 2018-08-16 NOTE — PROGRESS NOTES
[facility]  Progress Note     Randa Early  : 1940    Status: Inpatient  Day #: 3    Attending: Jeremy Manriquez MD  PCP: Onelia García MD      Assessment and Plan     Sepsis due to UTI  - urine culture - +e coli.  Sensitivities revie sounds  Musculoskeletal:  No joint swelling  Extremities:  No edema, no cyanosis, no clubbing  Neurologic:  nonfocal  Psychiatric:  Normal affect, calm and appropriate  Skin:  No rash, no lesion    Intake/Output Summary (Last 24 hours) at 08/16/18 1255  La (CST): Jennie Granados MD on 8/14/2018 at 15:39     Approved by (CST): Jennie Granados MD on 8/14/2018 at 15:41               Medications     • cefTRIAXone  2 g Intravenous Q24H   • ipratropium-albuterol  3 mL Nebulization 2 times daily   •

## 2018-08-16 NOTE — OCCUPATIONAL THERAPY NOTE
OCCUPATIONAL THERAPY EVALUATION - INPATIENT     Room Number: 325/325-A  Evaluation Date: 8/16/2018  Type of Evaluation: Initial  Presenting Problem:  (Fall)    Physician Order: IP Consult to Occupational Therapy  Reason for Therapy: ADL/IADL Dysfunction an Dehydration      Past Medical History  Past Medical History:   Diagnosis Date   • Chicken pox    • Colon polyp    • Diabetes ([facility] [address]    • Esophageal cancer (HCC)    • Esophageal varices (HCC)    • Essential hypertension    • Hearing impairment    • Hearing l COORDINATION  Gross Motor: WFL   Fine Motor: WFL       ACTIVITIES OF DAILY LIVING ASSESSMENT  AM-PAC ‘6-Clicks’ Inpatient Daily Activity Short Form  How much help from another person does the patient currently need…  -   Putting on and taking off regul

## 2018-08-16 NOTE — PLAN OF CARE
Problem: Patient/Family Goals  Goal: Patient/Family Long Term Goal  Patient's Long Term Goal: go home/ stay out of hospital    Interventions:  - PT/OT ordered for patient to evaluate for discharge needs  - See additional Care Plan goals for specific interv

## 2018-08-16 NOTE — PHYSICAL THERAPY NOTE
PHYSICAL THERAPY EVALUATION - INPATIENT     Room Number: 325/325-A  Evaluation Date: 8/16/2018  Type of Evaluation: Initial   Physician Order: PT Eval and Treat    Presenting Problem: anemia  Reason for Therapy: Mobility Dysfunction and Discharge Planning • Colon polyp    • Diabetes [facility])    • Esophageal cancer (HCC)    • Esophageal varices (HCC)    • Essential hypertension    • Hearing impairment    • Hearing loss    • Hepatitis    • Hepatosplenomegaly    • High blood pressure    • High cholesterol    • Sitting: Good  Static Standing: Fair +  Dynamic Standing: Fair -    ACTIVITY TOLERANCE  O2 Saturation: 95%  Room air    AM-PAC '6-Clicks' INPATIENT SHORT FORM - BASIC MOBILITY  How much difficulty does the patient currently have. ..  -   Turning over in bed Current Status    Goal #4    Goal #4   Current Status    Goal #5 Patient to demonstrate independence with home activity/exercise instructions provided to patient in preparation for discharge.    Goal #5   Current Status    Goal #6    Goal #6  Current Stat

## 2018-08-16 NOTE — PROGRESS NOTES
[facility] ID PROGRESS NOTE    Hortencia Hester Patient Status:  Inpatient    1940 MRN [id number]   Location [facility] 3W/SW Attending Aidlia Castelan MD   Hosp Day # 3 PCP Yamilet Abrams MD     Subjective:  Awake, laying in bed.  No complaints awilda 34.9 in adult     Anemia     Metabolic acidosis     Hyperglycemia     Anemia, unspecified type     Fall, initial encounter     Dehydration      ASSESSMENT:    Antibiotics: Zosyn, day 3  Ceftriaxone     Laila Martinez is an [de-identified]year old male with a history of H fever curve, wbc. -  Reviewed labs, micro, imaging reports.  -  Case d/w patient, RN.     Joslyn Sellers PA-C  [facility] [place] [facility]  [phone]  8/16/2018

## 2018-08-16 NOTE — DISCHARGE SUMMARY
[facility]  Discharge Summary     Kj Hdez  : 1940    Status: Inpatient  Day #: 5    Attending: Carter Perkins MD  PCP: Godfrey Starkey MD     Date of Admission: 2018  Date of Discharge: 2018     Hospital Discharge Diag cardiology     Uncontrolled DM2  - resume oral agents on discharge    Initial plan was for d/c to rehab, but patient has improved quite a bit and is stable with a walker. His wife is back in town and will be with him.      Consultants     Provider Role Spec total) by mouth daily. Quantity:  90 tablet  Refills:  3     Melatonin 5 MG Tabs      Take by mouth. Refills:  0     MetFORMIN HCl 1000 MG Tabs  Commonly known as:  GLUCOPHAGE      Take 1,000 mg by mouth.    Refills:  0     MetFORMIN HCl 1000 MG Tabs  C counseling patient and discussing discharge plans. All questions answered.       Tonio Lua MD

## 2018-08-16 NOTE — PAYOR COMM NOTE
REQUESTING ADDITIONAL DAY    CONTINUED STAY REVIEW    [facility] EPO  Subscriber #:  [id number]  Authorization Number: [id number]    Admit date: 8/13/18  Admit time: 0    Admitting Physician: Neeta Jolly MD  Attending Physician:   Nimesh Lemus Normal Saline Flush 0.9 % injection 3 mL     Date Action Dose Route User    8/16/2018 0946 Given 3 mL Intravenous Ileana Grimes, RN      Pantoprazole Sodium (PROTONIX) EC tab 20 mg     Date Action Dose Route User    8/16/2018 0534 Given 20 mg Oral Olmed : Karla Kamara MD (Physician)   []Manual[]Template  []Copied  [facility]  Progress Note      Jose Miguel Franc  : 1940    Status: Inpatient  Day #: 3    Attending: Josh Jones MD  PCP: Mercedes Nunez MD       Assessment and Plan Pulmonary:  Clear to auscultation bilaterally, respirations unlabored  Gastrointestinal:  Soft, non-tender, normal bowel sounds  Musculoskeletal:  No joint swelling  Extremities:  No edema, no cyanosis, no clubbing  Neurologic:  nonfocal  Psychiatric:  Nor < > = values in this interval not displayed.         Xr Chest Ap Portable  (cpt=71045)     Result Date: 8/14/2018  CONCLUSION:     1. Mild CHF.      Dictated by (CST): Aundrea Granados MD on 8/14/2018 at 15:39     Approved by (CST): Bobby Granados /68 (BP Location: Right arm)   Pulse 73   Temp 98.4 °F (36.9 °C) (Oral)   Resp 20   Ht 6' (1.829 m)   Wt 240 lb 11.2 oz (109.2 kg)   SpO2 93%   BMI 32.64 kg/m²      Gen:                   Awake, no apparent distress  HEENT:             PERRLA/EOMI, n Lola Henryr an [de-identified]year old male with a history of HTN, diabetes mellitus, alcoholic liver cirrhosis, h/o esophageal varices, recent esophageal cancer diagnosis s/p treatment with endoscopic mucosal resection with radiofrequency ablation 6/21/18, who pre

## 2018-08-17 LAB
GLUCOSE BLDC GLUCOMTR-MCNC: 151 MG/DL (ref 70–99)
GLUCOSE BLDC GLUCOMTR-MCNC: 192 MG/DL (ref 70–99)
GLUCOSE BLDC GLUCOMTR-MCNC: 201 MG/DL (ref 70–99)
GLUCOSE BLDC GLUCOMTR-MCNC: 279 MG/DL (ref 70–99)
POTASSIUM SERPL-SCNC: 3.5 MMOL/L (ref 3.3–5.1)
POTASSIUM SERPL-SCNC: 4 MMOL/L (ref 3.3–5.1)

## 2018-08-17 PROCEDURE — 99232 SBSQ HOSP IP/OBS MODERATE 35: CPT | Performed by: INTERNAL MEDICINE

## 2018-08-17 PROCEDURE — 99233 SBSQ HOSP IP/OBS HIGH 50: CPT | Performed by: HOSPITALIST

## 2018-08-17 RX ORDER — POTASSIUM CHLORIDE 20 MEQ/1
40 TABLET, EXTENDED RELEASE ORAL EVERY 4 HOURS
Status: COMPLETED | OUTPATIENT
Start: 2018-08-17 | End: 2018-08-17

## 2018-08-17 RX ORDER — CEFDINIR 300 MG/1
300 CAPSULE ORAL 2 TIMES DAILY
Qty: 10 CAPSULE | Refills: 0 | Status: SHIPPED | OUTPATIENT
Start: 2018-08-17 | End: 2018-08-22

## 2018-08-17 NOTE — CM/SW NOTE
PT/OT obtained and sent via [facility] to [address] [place] [address]. SW spoke with snf liaison who states insurance Christie Fuentes is pending.      NANCY moore [id number]

## 2018-08-17 NOTE — PROGRESS NOTES
[facility]  Progress Note     Wilfredo Fair  : 1940    Status: Inpatient  Day #: 4    Attending: Eden Reaves MD  PCP: Ana Sparks MD      Assessment and Plan     Sepsis due to UTI  - urine culture - +e coli.  Sensitivities revie no lesion    Intake/Output Summary (Last 24 hours) at 08/17/18 1250  Last data filed at 08/17/18 0900   Gross per 24 hour   Intake             1260 ml   Output                0 ml   Net             1260 ml         Recent Labs   Lab  08/14/18   4212  08/15/ Units Subcutaneous Nightly   • Losartan Potassium  50 mg Oral Daily   • Metoprolol Succinate ER  50 mg Oral Daily   • Pantoprazole Sodium  20 mg Oral QAM AC   • Pravastatin Sodium  20 mg Oral Nightly   • Insulin Aspart Pen  1-7 Units Subcutaneous TID CC an

## 2018-08-17 NOTE — PROGRESS NOTES
[facility]  Progress Note    Kj Hdez Patient Status:  Inpatient    1940 MRN [id number]   Location [facility] 3W/SW Attending Josefa Bhat MD   Hosp Day # 4 PCP Godfrey Starkey MD     Subjective:  Kj Hdez is a(n) [de-identified[facility]

## 2018-08-17 NOTE — OCCUPATIONAL THERAPY NOTE
OCCUPATIONAL THERAPY TREATMENT NOTE - INPATIENT        Room Number: 325/325-A           Presenting Problem:  (Fall)    Problem List  Principal Problem:    Anemia, unspecified type  Active Problems:    Anemia    Metabolic acidosis    Hyperglycemia    Fall, body clothing?: A Little  -   Bathing (including washing, rinsing, drying)?: A Little  -   Toileting, which includes using toilet, bedpan or urinal? : A Little  -   Putting on and taking off regular upper body clothing?: None  -   Taking care of personal g

## 2018-08-17 NOTE — CM/SW NOTE
JHONY met with pt and pt's son at bedside to discuss karlee placement. Referral has been sent to [facility]. JHONY spoke with [facility] [phone] regarding the status of the referral. Jaya Calabrese needed PT/OT notes. JHONY sent notes via [facility].    [facility]

## 2018-08-17 NOTE — PROGRESS NOTES
[facility] ID PROGRESS NOTE    Misty Magaña Patient Status:  Inpatient    1940 MRN [id number]   Location [facility] 3W/SW Attending Prosper Dial MD   Hosp Day # 4 PCP Leonce Seip, MD     Subjective:  Awake, sitting in chair.  Conversive this A Hyperglycemia     Anemia, unspecified type     Fall, initial encounter     Dehydration      ASSESSMENT:    Antibiotics: Ceftriaxone  Zosyn     Nubia Perry is an [de-identified]year old male with a history of HTN, diabetes mellitus, alcoholic liver cirrhosis, h/o esoph SEBASTIAN.    [facility]  [phone]  8/16/2018

## 2018-08-18 VITALS
OXYGEN SATURATION: 98 % | WEIGHT: 244.19 LBS | HEIGHT: 72 IN | TEMPERATURE: 99 F | SYSTOLIC BLOOD PRESSURE: 122 MMHG | HEART RATE: 71 BPM | RESPIRATION RATE: 18 BRPM | DIASTOLIC BLOOD PRESSURE: 52 MMHG | BODY MASS INDEX: 33.07 KG/M2

## 2018-08-18 LAB
GLUCOSE BLDC GLUCOMTR-MCNC: 130 MG/DL (ref 70–99)
GLUCOSE BLDC GLUCOMTR-MCNC: 158 MG/DL (ref 70–99)

## 2018-08-18 PROCEDURE — 99239 HOSP IP/OBS DSCHRG MGMT >30: CPT | Performed by: HOSPITALIST

## 2018-08-18 NOTE — PROGRESS NOTES
[facility]  Progress Note     Ida Olmstead  : 1940    Status: Inpatient  Day #: 5    Attending: Liv Biggs MD  PCP: Reji Ansari MD      Assessment and Plan     Sepsis due to UTI  - urine culture - +e coli.  Sensitivities revie appropriate  Skin:  No rash, no lesion    Intake/Output Summary (Last 24 hours) at 08/18/18 1133  Last data filed at 08/18/18 0600   Gross per 24 hour   Intake              890 ml   Output              625 ml   Net              265 ml         Recent Labs interval not displayed.                 Medications     • cefTRIAXone  2 g Intravenous Q24H   • ipratropium-albuterol  3 mL Nebulization 2 times daily   • insulin detemir  20 Units Subcutaneous Nightly   • Losartan Potassium  50 mg Oral Daily   • Metoprolol

## 2018-08-18 NOTE — HOME CARE LIAISON
Met with patient and his wife at the bedside. Both are agreeable to [address], pending insurance verification. Brochure and liaison's business card provided with contact information. All questions addressed and answered.

## 2018-08-18 NOTE — PLAN OF CARE
Problem: Patient/Family Goals  Goal: Patient/Family Long Term Goal  Patient's Long Term Goal: go home/ stay out of hospital    Interventions:  - PT/OT ordered for patient to evaluate for discharge needs  - See additional Care Plan goals for specific interv effectiveness of GI medications  - Encourage mobilization and activity  - Obtain nutritional consult as needed  - Establish a toileting routine/schedule  - Consider collaborating with pharmacy to review patient's medication profile   Outcome: Progressing

## 2018-08-18 NOTE — CM/SW NOTE
SW received MDO regarding HHC. Sw spoke with pt's wife Micaela Coelho who stated that they have changed their minds and no longer want pt to go go [facility]. SW discussed HHC options and provided choice.  Pt has no preference and is agreeable with referral being made

## 2018-08-19 ENCOUNTER — TELEPHONE (OUTPATIENT)
Dept: CARDIOLOGY UNIT | Facility: HOSPITAL | Age: 78
End: 2018-08-19

## 2018-08-20 ENCOUNTER — PATIENT OUTREACH (OUTPATIENT)
Dept: CASE MANAGEMENT | Age: 78
End: 2018-08-20

## 2018-08-20 DIAGNOSIS — Z02.9 ENCOUNTERS FOR ADMINISTRATIVE PURPOSE: ICD-10-CM

## 2018-08-20 NOTE — PROGRESS NOTES
TCM OUTREACH    My chart message sent to patient requesting call back to review discharge instructions, medications and schedule TCM apt .      Book By Date 9/1/18

## 2018-08-20 NOTE — PAYOR COMM NOTE
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 Acute blood loss anemia. Hgb stable. No active bleed.   - GI on consult  - monitor cbc  - cont protonix  - transfused 1 unit PRBC  - EGD - grade 2 esophageal varices, esophageal stricture, portal taking these medications      Instructions Prescription details   cefdinir 300 MG Caps  Commonly known as:  OMNICEF      Take 1 capsule (300 mg total) by mouth 2 (two) times daily.    Stop taking on:  8/22/2018  Quantity:  10 capsule  Refills:  0        CON DYAZIDE      TAKE 1 CAPSULE BY MOUTH EVERY MORNING.    Quantity:  90 capsule  Refills:  1        STOP taking these medications    aspirin 81 MG Tabs              Where to Get Your Medications      Please  your prescriptions at the location directed b

## 2018-08-21 ENCOUNTER — TELEPHONE (OUTPATIENT)
Dept: INTERNAL MEDICINE CLINIC | Facility: CLINIC | Age: 78
End: 2018-08-21

## 2018-08-21 NOTE — PROGRESS NOTES
Initial Post Discharge Follow Up   Discharge Date: 8/18/18  Contact Date: 8/20/2018    Consent Verification:  Assessment Completed With: Spouse: wife, Pricila Zhong received per patient?  written  HIPAA Verified?   Yes    Discharge Dx:  Sepsis d/t nateglinide 120 MG Oral Tab Take 1 tablet (120 mg total) by mouth 2 (two) times daily with meals. Disp: 180 tablet Rfl: 1   Losartan Potassium 50 MG Oral Tab Take 1 tablet (50 mg total) by mouth daily.  Disp: 90 tablet Rfl: 3   Blood Glucose Monitoring [facility] Renato Barr at [address] [facility])    Oct 17, 2018  3:30 PM CDT HEMATOLOGY ONCOLOGY FOLLOW UP with Boy Son [facility] Hematology Oncology [facility])        [address] [facility]  [place]

## 2018-08-21 NOTE — TELEPHONE ENCOUNTER
Spoke to pt's wife per HIPPA for TCM today. Wife requesting to schedule HFU appt for pt with PCP next week. Unable to make appt due to schedule limitations. Wife requesting appt at [facility] Thursday 8/30 as she will be off work and able to take pt to appt.

## 2018-08-30 ENCOUNTER — LAB ENCOUNTER (OUTPATIENT)
Dept: LAB | Facility: HOSPITAL | Age: 78
End: 2018-08-30
Attending: INTERNAL MEDICINE
Payer: COMMERCIAL

## 2018-08-30 ENCOUNTER — OFFICE VISIT (OUTPATIENT)
Dept: INTERNAL MEDICINE CLINIC | Facility: CLINIC | Age: 78
End: 2018-08-30
Payer: COMMERCIAL

## 2018-08-30 VITALS
SYSTOLIC BLOOD PRESSURE: 142 MMHG | HEIGHT: 72 IN | WEIGHT: 247 LBS | BODY MASS INDEX: 33.46 KG/M2 | HEART RATE: 82 BPM | RESPIRATION RATE: 16 BRPM | DIASTOLIC BLOOD PRESSURE: 74 MMHG

## 2018-08-30 DIAGNOSIS — D69.6 THROMBOCYTOPENIA (HCC): ICD-10-CM

## 2018-08-30 DIAGNOSIS — Z87.891 HISTORY OF TOBACCO USE: ICD-10-CM

## 2018-08-30 DIAGNOSIS — R16.2 HEPATOSPLENOMEGALY: ICD-10-CM

## 2018-08-30 DIAGNOSIS — Z87.898 HISTORY OF ALCOHOL USE: ICD-10-CM

## 2018-08-30 DIAGNOSIS — E11.9 TYPE 2 DIABETES MELLITUS WITHOUT COMPLICATION, WITHOUT LONG-TERM CURRENT USE OF INSULIN (HCC): ICD-10-CM

## 2018-08-30 DIAGNOSIS — C15.4 MALIGNANT NEOPLASM OF MIDDLE THIRD OF ESOPHAGUS (HCC): ICD-10-CM

## 2018-08-30 DIAGNOSIS — N39.0 ACUTE LOWER UTI: Primary | ICD-10-CM

## 2018-08-30 DIAGNOSIS — I10 ESSENTIAL HYPERTENSION WITH GOAL BLOOD PRESSURE LESS THAN 130/85: ICD-10-CM

## 2018-08-30 LAB
BASOPHILS # BLD: 0 K/UL (ref 0–0.2)
BASOPHILS NFR BLD: 1 %
EOSINOPHIL # BLD: 0.3 K/UL (ref 0–0.7)
EOSINOPHIL NFR BLD: 7 %
ERYTHROCYTE [DISTWIDTH] IN BLOOD BY AUTOMATED COUNT: 17.6 % (ref 11–15)
FERRITIN SERPL IA-MCNC: 9 NG/ML (ref 24–336)
HCT VFR BLD AUTO: 26.1 % (ref 41–52)
HGB BLD-MCNC: 8.1 G/DL (ref 13.5–17.5)
IRON SATN MFR SERPL: 2 % (ref 20–55)
IRON SERPL-MCNC: 12 MCG/DL (ref 45–182)
LYMPHOCYTES # BLD: 0.7 K/UL (ref 1–4)
LYMPHOCYTES NFR BLD: 17 %
MCH RBC QN AUTO: 24.3 PG (ref 27–32)
MCHC RBC AUTO-ENTMCNC: 30.9 G/DL (ref 32–37)
MCV RBC AUTO: 78.5 FL (ref 80–100)
MONOCYTES # BLD: 0.6 K/UL (ref 0–1)
MONOCYTES NFR BLD: 15 %
NEUTROPHILS # BLD AUTO: 2.4 K/UL (ref 1.8–7.7)
NEUTROPHILS NFR BLD: 60 %
PLATELET # BLD AUTO: 125 K/UL (ref 140–400)
PMV BLD AUTO: 8.4 FL (ref 7.4–10.3)
RBC # BLD AUTO: 3.33 M/UL (ref 4.5–5.9)
TIBC SERPL-MCNC: 483 MCG/DL (ref 228–428)
TRANSFERRIN SERPL-MCNC: 366 MG/DL (ref 180–329)
WBC # BLD AUTO: 4.1 K/UL (ref 4–11)

## 2018-08-30 PROCEDURE — 99495 TRANSJ CARE MGMT MOD F2F 14D: CPT | Performed by: INTERNAL MEDICINE

## 2018-08-30 PROCEDURE — 1111F DSCHRG MED/CURRENT MED MERGE: CPT | Performed by: INTERNAL MEDICINE

## 2018-08-30 PROCEDURE — 83540 ASSAY OF IRON: CPT

## 2018-08-30 PROCEDURE — 82728 ASSAY OF FERRITIN: CPT

## 2018-08-30 PROCEDURE — 36415 COLL VENOUS BLD VENIPUNCTURE: CPT

## 2018-08-30 PROCEDURE — 84466 ASSAY OF TRANSFERRIN: CPT

## 2018-08-30 PROCEDURE — 85025 COMPLETE CBC W/AUTO DIFF WBC: CPT

## 2018-08-30 RX ORDER — FERROUS SULFATE TAB EC 324 MG (65 MG FE EQUIVALENT) 324 (65 FE) MG
1 TABLET DELAYED RESPONSE ORAL 2 TIMES DAILY
Refills: 3 | Status: ON HOLD | COMMUNITY
Start: 2018-08-05 | End: 2018-10-11

## 2018-08-30 NOTE — PROGRESS NOTES
HPI:    Henrique George is a [de-identified]year old male here today for hospital follow up.    He was discharged from Inpatient hospital, [facility]  to Home   Admission Date: 8/13/18   Discharge Date: 8/18/18  Hospital Discharge Diagnoses (since 7/31/2018)     s Triamterene-HCTZ 37.5-25 MG Oral Cap TAKE 1 CAPSULE BY MOUTH EVERY MORNING. simvastatin 10 MG Oral Tab Take 1 tablet (10 mg total) by mouth nightly. nateglinide 120 MG Oral Tab Take 1 tablet (120 mg total) by mouth 2 (two) times daily with meals.    L 46) in his father. He  reports that he quit smoking about 20 years ago. His smoking use included Cigarettes. He has a 80.00 pack-year smoking history. He has never used smokeless tobacco. He reports that he drinks alcohol.  He reports that he does not use lower UTI    Had a recent UITI and was admitted to hospital with sepsis. Was treated and released. Has anemia that is being worked up currently/,    2.  Type 2 diabetes mellitus without complication, without long-term current use of insulin ([facility]    Griselda Kingfisher

## 2018-09-04 RX ORDER — FERROUS SULFATE TAB EC 324 MG (65 MG FE EQUIVALENT) 324 (65 FE) MG
TABLET DELAYED RESPONSE ORAL
Qty: 60 TABLET | Refills: 3 | Status: ON HOLD | OUTPATIENT
Start: 2018-09-04 | End: 2018-11-24

## 2018-09-04 NOTE — TELEPHONE ENCOUNTER
Pending Prescriptions Disp Refills    FERROUS SULFATE 324 (65 Fe) MG Oral Tab EC [Pharmacy Med Name: FERROUS SULF  MG TABLET] 60 tablet 3     Sig: TAKE 1 TABLET BY MOUTH TWICE A DAY         See refill request  Patient last seen 6-13-18.    Medicatio

## 2018-09-06 RX ORDER — METOPROLOL SUCCINATE 50 MG/1
TABLET, EXTENDED RELEASE ORAL
Qty: 90 TABLET | Refills: 0 | Status: SHIPPED | OUTPATIENT
Start: 2018-09-06 | End: 2018-12-02

## 2018-09-13 ENCOUNTER — LAB ENCOUNTER (OUTPATIENT)
Dept: LAB | Age: 78
End: 2018-09-13
Attending: INTERNAL MEDICINE
Payer: COMMERCIAL

## 2018-09-13 ENCOUNTER — NURSE TRIAGE (OUTPATIENT)
Dept: OTHER | Age: 78
End: 2018-09-13

## 2018-09-13 DIAGNOSIS — D50.0 IRON DEFICIENCY ANEMIA DUE TO CHRONIC BLOOD LOSS: ICD-10-CM

## 2018-09-13 DIAGNOSIS — D50.0 IRON DEFICIENCY ANEMIA DUE TO CHRONIC BLOOD LOSS: Primary | ICD-10-CM

## 2018-09-13 LAB
BASOPHILS # BLD: 0.1 K/UL (ref 0–0.2)
BASOPHILS NFR BLD: 2 %
EOSINOPHIL # BLD: 0.3 K/UL (ref 0–0.7)
EOSINOPHIL NFR BLD: 7 %
ERYTHROCYTE [DISTWIDTH] IN BLOOD BY AUTOMATED COUNT: 17.7 % (ref 11–15)
FERRITIN SERPL IA-MCNC: 8 NG/ML (ref 24–336)
HCT VFR BLD AUTO: 25.5 % (ref 41–52)
HGB BLD-MCNC: 7.7 G/DL (ref 13.5–17.5)
LYMPHOCYTES # BLD: 0.7 K/UL (ref 1–4)
LYMPHOCYTES NFR BLD: 18 %
MCH RBC QN AUTO: 22.8 PG (ref 27–32)
MCHC RBC AUTO-ENTMCNC: 30 G/DL (ref 32–37)
MCV RBC AUTO: 76 FL (ref 80–100)
MONOCYTES # BLD: 0.8 K/UL (ref 0–1)
MONOCYTES NFR BLD: 20 %
NEUTROPHILS # BLD AUTO: 2.1 K/UL (ref 1.8–7.7)
NEUTROPHILS NFR BLD: 53 %
PLATELET # BLD AUTO: 119 K/UL (ref 140–400)
PMV BLD AUTO: 8.8 FL (ref 7.4–10.3)
RBC # BLD AUTO: 3.36 M/UL (ref 4.5–5.9)
WBC # BLD AUTO: 3.9 K/UL (ref 4–11)

## 2018-09-13 PROCEDURE — 82728 ASSAY OF FERRITIN: CPT

## 2018-09-13 PROCEDURE — 36415 COLL VENOUS BLD VENIPUNCTURE: CPT

## 2018-09-13 PROCEDURE — 85025 COMPLETE CBC W/AUTO DIFF WBC: CPT

## 2018-09-13 NOTE — TELEPHONE ENCOUNTER
Spoke with patient's wife, and informed bloodwork ordered, and if patient worsens or becomes more SOB to go to ER. She agreed.

## 2018-09-13 NOTE — TELEPHONE ENCOUNTER
bloodwork has been placed in computer and patient can come in for blood test. No fasting needed.  Call patient

## 2018-09-13 NOTE — TELEPHONE ENCOUNTER
Action Requested: Summary for Provider     []  Critical Lab, Recommendations Needed  [] Need Additional Advice  []   FYI    []   Need Orders  [] Need Medications Sent to Pharmacy  []  Other     SUMMARY:  An appointment was suggested to the wife who Refused

## 2018-09-14 ENCOUNTER — TELEPHONE (OUTPATIENT)
Dept: OTHER | Age: 78
End: 2018-09-14

## 2018-09-14 NOTE — TELEPHONE ENCOUNTER
or Chari Briggs wife called back and was inform of  message below. She stated that if its a 7.7 if it would be fine for him to be at home? I advised her to call  office as he knows the pt. She stated that she will but will like t

## 2018-09-14 NOTE — TELEPHONE ENCOUNTER
LMTCB transfer to triage    MD Jovita Caraballo, RN; Em Im Cf Lpn/Cma 22 minutes ago (2:01 PM)      Have patient see DR Kenneth ACUNA (Routing comment)

## 2018-09-14 NOTE — TELEPHONE ENCOUNTER
lmtcb, if patient calls she can speak to the nurse, patient hemoglobin 7.7 if he does not have shortness of breath dizziness chest pain he can be followed,  And have CBC can be done in a week but he should see to see Dr. Eliu Nunes hematologist ,he needs treat

## 2018-09-14 NOTE — TELEPHONE ENCOUNTER
Spouse called back and was informed of PVR's and NK's responses below. Spouse states will call Dr Meryl Esparza office for sooner appt.

## 2018-09-14 NOTE — TELEPHONE ENCOUNTER
Dr Ashkan Fall, on-call, for Dr Patsy Lucero, please advise. Patient had lab work --CBC, ferritan-- done 9/13/18. Wife (on HIPAA) called about results. See OV 8/3018 and Triage encounter of 9/13/18.    Call wife at [phone]    Dr Ashkan Fall, sent a message to lo

## 2018-09-14 NOTE — TELEPHONE ENCOUNTER
Attempted to call wife no answer left message to call back and speak to the nurse for guidance blood test showed, hemoglobin is low more or less the same as before 7.7 low ferritin level, patient needs treatment, I see this test was requested also by hemat

## 2018-09-18 NOTE — TELEPHONE ENCOUNTER
Spouse calling back. She will call Dr. Tegan Barber office tomorrow but spouse would like to know if Dr. Naz Naidu can add pt  To his schedule on Thurs 09/20? She has the day off of work and would be able to bring the pt in that day. please advise.

## 2018-09-19 ENCOUNTER — TELEPHONE (OUTPATIENT)
Dept: HEMATOLOGY/ONCOLOGY | Facility: HOSPITAL | Age: 78
End: 2018-09-19

## 2018-09-19 ENCOUNTER — TELEPHONE (OUTPATIENT)
Dept: OTHER | Age: 78
End: 2018-09-19

## 2018-09-19 NOTE — TELEPHONE ENCOUNTER
Called mobile number. No answer. Agree patient needs to see Dr Antione Cabrales for significant anemia tomorrow.

## 2018-09-19 NOTE — TELEPHONE ENCOUNTER
Demarcus godinez said Dr Rashard Moreland suggested Ephraim Alvarez should f/u with Dr Eliu Nunes to review labs. Please advise.  Demarcus Gupta working today in [address] [place] [address]. Eduarda Rueda

## 2018-09-19 NOTE — TELEPHONE ENCOUNTER
Pt spouse calling and requests message be given to MD. PT spouse  wanted to inform Dr Eloisa Loja that pt is scheduled to see Dr Chirstiano Cabrera tomorrow.     Will inform MD.

## 2018-09-21 ENCOUNTER — LAB ENCOUNTER (OUTPATIENT)
Dept: LAB | Facility: HOSPITAL | Age: 78
End: 2018-09-21
Attending: INTERNAL MEDICINE
Payer: COMMERCIAL

## 2018-09-21 ENCOUNTER — OFFICE VISIT (OUTPATIENT)
Dept: HEMATOLOGY/ONCOLOGY | Facility: HOSPITAL | Age: 78
End: 2018-09-21
Attending: INTERNAL MEDICINE
Payer: COMMERCIAL

## 2018-09-21 ENCOUNTER — TELEPHONE (OUTPATIENT)
Dept: HEMATOLOGY/ONCOLOGY | Facility: HOSPITAL | Age: 78
End: 2018-09-21

## 2018-09-21 VITALS
DIASTOLIC BLOOD PRESSURE: 48 MMHG | HEART RATE: 78 BPM | OXYGEN SATURATION: 96 % | HEIGHT: 72 IN | TEMPERATURE: 98 F | BODY MASS INDEX: 33.46 KG/M2 | WEIGHT: 247 LBS | SYSTOLIC BLOOD PRESSURE: 136 MMHG | RESPIRATION RATE: 18 BRPM

## 2018-09-21 DIAGNOSIS — D69.6 THROMBOCYTOPENIA (HCC): ICD-10-CM

## 2018-09-21 DIAGNOSIS — R16.2 HEPATOSPLENOMEGALY: ICD-10-CM

## 2018-09-21 DIAGNOSIS — Z87.898 HISTORY OF ALCOHOL USE: ICD-10-CM

## 2018-09-21 DIAGNOSIS — Z87.11 HISTORY OF GASTRIC ULCER: Primary | ICD-10-CM

## 2018-09-21 DIAGNOSIS — C15.4 MALIGNANT NEOPLASM OF MIDDLE THIRD OF ESOPHAGUS (HCC): ICD-10-CM

## 2018-09-21 DIAGNOSIS — Z87.11 HISTORY OF GASTRIC ULCER: ICD-10-CM

## 2018-09-21 LAB
ANTIBODY SCREEN: NEGATIVE
BASOPHILS # BLD: 0 K/UL (ref 0–0.2)
BASOPHILS NFR BLD: 0 %
EOSINOPHIL # BLD: 0.3 K/UL (ref 0–0.7)
EOSINOPHIL NFR BLD: 7 %
ERYTHROCYTE [DISTWIDTH] IN BLOOD BY AUTOMATED COUNT: 18.4 % (ref 11–15)
FERRITIN SERPL IA-MCNC: 9 NG/ML (ref 24–336)
HCT VFR BLD AUTO: 25.5 % (ref 41–52)
HGB BLD-MCNC: 7.8 G/DL (ref 13.5–17.5)
IRON SATN MFR SERPL: 2 % (ref 20–55)
IRON SERPL-MCNC: 11 MCG/DL (ref 45–182)
LYMPHOCYTES # BLD: 0.7 K/UL (ref 1–4)
LYMPHOCYTES NFR BLD: 19 %
MCH RBC QN AUTO: 22.5 PG (ref 27–32)
MCHC RBC AUTO-ENTMCNC: 30.4 G/DL (ref 32–37)
MCV RBC AUTO: 73.9 FL (ref 80–100)
MONOCYTES # BLD: 0.6 K/UL (ref 0–1)
MONOCYTES NFR BLD: 17 %
NEUTROPHILS # BLD AUTO: 2.1 K/UL (ref 1.8–7.7)
NEUTROPHILS NFR BLD: 57 %
PLATELET # BLD AUTO: 104 K/UL (ref 140–400)
PMV BLD AUTO: 7.7 FL (ref 7.4–10.3)
RBC # BLD AUTO: 3.46 M/UL (ref 4.5–5.9)
RH BLOOD TYPE: POSITIVE
TIBC SERPL-MCNC: 484 MCG/DL (ref 228–428)
TRANSFERRIN SERPL-MCNC: 367 MG/DL (ref 180–329)
WBC # BLD AUTO: 3.7 K/UL (ref 4–11)

## 2018-09-21 PROCEDURE — 85025 COMPLETE CBC W/AUTO DIFF WBC: CPT

## 2018-09-21 PROCEDURE — 86901 BLOOD TYPING SEROLOGIC RH(D): CPT

## 2018-09-21 PROCEDURE — 82728 ASSAY OF FERRITIN: CPT

## 2018-09-21 PROCEDURE — 84466 ASSAY OF TRANSFERRIN: CPT

## 2018-09-21 PROCEDURE — 99215 OFFICE O/P EST HI 40 MIN: CPT | Performed by: INTERNAL MEDICINE

## 2018-09-21 PROCEDURE — 36415 COLL VENOUS BLD VENIPUNCTURE: CPT

## 2018-09-21 PROCEDURE — 86850 RBC ANTIBODY SCREEN: CPT

## 2018-09-21 PROCEDURE — 83540 ASSAY OF IRON: CPT

## 2018-09-21 PROCEDURE — 86900 BLOOD TYPING SEROLOGIC ABO: CPT

## 2018-09-21 NOTE — PROGRESS NOTES
[facility] Progress Note    Patient Name: Georges Mo   YOB: 1940   Medical Record Number: [id number]   CSN: [id number]   Consulting Physician: Cy Lee MD  Referring Physician(s): Erick Pradhan  Date of Visit: 9/21/2018     Chi concern of lowered hgb from 8 to 7 g/dL range. Pt denies signs of blood loss from any orifice. Elie Augustinecathi feels that food is able to going smoother, has an inc in his PPI; again with improved symptoms. Baseline tumor markers not elevated for this patient.  The Comment:  Performed by Kassie Tinsley MD at [facility] ENDOSCOPY  5/24/2018: ESOPHAGOGASTRODUODENOSCOPY (EGD); N/A      Comment:  Performed by Rock Tyrese MD at [facility] ENDOSCOPY  No date: KIDNEY SURGERY      Comment:  open kidney stone removal  7/22/15: Deandre Witt Asked        Blood Transfusions: Not Asked        Caffeine Concern: Yes          3 cups of coffee daily         Occupational Exposure: Not Asked        Hobby Hazards: Not Asked        Sleep Concern: Not Asked        Stress Concern: Not Asked        Weight tablet (50 mg total) by mouth daily.  Disp: 90 tablet Rfl: 3   Blood Glucose Monitoring Suppl (ONETOUCH ULTRA MINI) w/Device Does not apply Kit Test blood sugar daily Disp: 1 kit Rfl: 0   Glucose Blood In Vitro Strip TEST 1 TIME DAILY Disp: 100 strip Rfl: 3 inguinal regions. Chest: Clear to auscultation. No wheezes or rales. Heart: Regular rate and rhythm. S1S2 normal.  Abdomen: Soft, non tender with good bowel sounds. No hepatosplenomegaly. No palpable mass.   Large protuberant abdomen  Extremities: No ed IRON AND TIBC, FERRITIN, CBC WITH DIFFERENTIAL         WITH PLATELET, IRON AND TIBC, FERRITIN, TYPE         AND SCREEN, CBC W/ DIFFERENTIAL, ABORH (BLOOD         TYPE), ANTIBODY SCREEN, CBC WITH DIFFERENTIAL         WITH PLATELET, IRON AND TIBC, FERRITIN, this point  --The patient was seen by Dr. Dru Rangel in GI who performed repeat EGD/EUS  --He completed staging with PET CT imaging which shows no evidence of distant metastatic disease or local regional lymph node involvement  --NORMAL values obtained resources were reviewed and discussed with the pateint and family. I spent 30 minutes face to face with the patient. More than 50% of that time was spent counseling the patient and/or on coordination of care.   The diagnosis, prognosis, and general mary

## 2018-09-21 NOTE — TELEPHONE ENCOUNTER
Lab results back. Dr Cathy Black notified. No transfusion needed. Call to spouse, LM indicating no need for blood transfusion, plan for iron infusion - will get a call from our infusion center next week -and plan GI appt for scope.

## 2018-09-24 ENCOUNTER — TELEPHONE (OUTPATIENT)
Dept: OTHER | Age: 78
End: 2018-09-24

## 2018-09-24 DIAGNOSIS — E11.8 TYPE 2 DIABETES MELLITUS WITH COMPLICATION, WITHOUT LONG-TERM CURRENT USE OF INSULIN (HCC): Primary | ICD-10-CM

## 2018-09-24 NOTE — TELEPHONE ENCOUNTER
Daughter Aguilar Harper requesting lab orders prior to patient's next follow-up appointment. Please advise.

## 2018-09-27 ENCOUNTER — OFFICE VISIT (OUTPATIENT)
Dept: HEMATOLOGY/ONCOLOGY | Facility: HOSPITAL | Age: 78
End: 2018-09-27
Attending: INTERNAL MEDICINE
Payer: COMMERCIAL

## 2018-09-27 VITALS
RESPIRATION RATE: 16 BRPM | SYSTOLIC BLOOD PRESSURE: 134 MMHG | HEART RATE: 74 BPM | DIASTOLIC BLOOD PRESSURE: 53 MMHG | TEMPERATURE: 98 F

## 2018-09-27 DIAGNOSIS — R16.2 HEPATOSPLENOMEGALY: ICD-10-CM

## 2018-09-27 DIAGNOSIS — Z87.898 HISTORY OF ALCOHOL USE: ICD-10-CM

## 2018-09-27 DIAGNOSIS — C15.4 MALIGNANT NEOPLASM OF MIDDLE THIRD OF ESOPHAGUS (HCC): Primary | ICD-10-CM

## 2018-09-27 DIAGNOSIS — Z87.11 HISTORY OF GASTRIC ULCER: ICD-10-CM

## 2018-09-27 PROCEDURE — A4216 STERILE WATER/SALINE, 10 ML: HCPCS

## 2018-09-27 PROCEDURE — 96374 THER/PROPH/DIAG INJ IV PUSH: CPT

## 2018-09-27 RX ORDER — SODIUM CHLORIDE 9 MG/ML
INJECTION, SOLUTION INTRAVENOUS
Status: DISCONTINUED
Start: 2018-09-27 | End: 2018-09-27

## 2018-09-27 RX ORDER — 0.9 % SODIUM CHLORIDE 0.9 %
VIAL (ML) INJECTION
Status: DISCONTINUED
Start: 2018-09-27 | End: 2018-09-27

## 2018-09-27 NOTE — PROGRESS NOTES
Patient here for 1 of 2 feraheme ordered for Iron deficiency anemia  Ambulated from waiting room with steady gait. [facility] - he stated understanding. He states he has been having fatigue denies SOB.   He states

## 2018-10-01 ENCOUNTER — APPOINTMENT (OUTPATIENT)
Dept: RADIATION ONCOLOGY | Facility: HOSPITAL | Age: 78
End: 2018-10-01
Attending: RADIOLOGY
Payer: COMMERCIAL

## 2018-10-03 ENCOUNTER — OFFICE VISIT (OUTPATIENT)
Dept: HEMATOLOGY/ONCOLOGY | Facility: HOSPITAL | Age: 78
End: 2018-10-03
Attending: INTERNAL MEDICINE
Payer: COMMERCIAL

## 2018-10-03 VITALS
DIASTOLIC BLOOD PRESSURE: 84 MMHG | HEART RATE: 77 BPM | RESPIRATION RATE: 18 BRPM | TEMPERATURE: 99 F | SYSTOLIC BLOOD PRESSURE: 160 MMHG

## 2018-10-03 DIAGNOSIS — Z87.898 HISTORY OF ALCOHOL USE: ICD-10-CM

## 2018-10-03 DIAGNOSIS — R16.2 HEPATOSPLENOMEGALY: ICD-10-CM

## 2018-10-03 DIAGNOSIS — C15.4 MALIGNANT NEOPLASM OF MIDDLE THIRD OF ESOPHAGUS (HCC): Primary | ICD-10-CM

## 2018-10-03 DIAGNOSIS — Z87.11 HISTORY OF GASTRIC ULCER: ICD-10-CM

## 2018-10-03 PROCEDURE — A4216 STERILE WATER/SALINE, 10 ML: HCPCS

## 2018-10-03 PROCEDURE — 96365 THER/PROPH/DIAG IV INF INIT: CPT

## 2018-10-03 RX ORDER — SODIUM CHLORIDE 9 MG/ML
INJECTION, SOLUTION INTRAVENOUS
Status: DISCONTINUED
Start: 2018-10-03 | End: 2018-10-03

## 2018-10-03 RX ORDER — 0.9 % SODIUM CHLORIDE 0.9 %
VIAL (ML) INJECTION
Status: DISCONTINUED
Start: 2018-10-03 | End: 2018-10-03

## 2018-10-03 NOTE — PROGRESS NOTES
Patient here for 2 of 2 feraheme ordered for Iron deficiency anemia  Ambulated from waiting room with steady gait. Discussed how patient felt post feraheme #1- states he sees no change.   States he has been having a hard time sleeping and is going to talk t

## 2018-10-04 ENCOUNTER — APPOINTMENT (OUTPATIENT)
Dept: HEMATOLOGY/ONCOLOGY | Facility: HOSPITAL | Age: 78
End: 2018-10-04
Attending: INTERNAL MEDICINE
Payer: COMMERCIAL

## 2018-10-11 ENCOUNTER — ANESTHESIA EVENT (OUTPATIENT)
Dept: ENDOSCOPY | Facility: HOSPITAL | Age: 78
End: 2018-10-11
Payer: COMMERCIAL

## 2018-10-11 ENCOUNTER — HOSPITAL ENCOUNTER (OUTPATIENT)
Facility: HOSPITAL | Age: 78
Setting detail: HOSPITAL OUTPATIENT SURGERY
Discharge: HOME | End: 2018-10-11
Attending: INTERNAL MEDICINE | Admitting: INTERNAL MEDICINE
Payer: COMMERCIAL

## 2018-10-11 ENCOUNTER — ANESTHESIA (OUTPATIENT)
Dept: ENDOSCOPY | Facility: HOSPITAL | Age: 78
End: 2018-10-11
Payer: COMMERCIAL

## 2018-10-11 DIAGNOSIS — C15.4 MALIGNANT NEOPLASM OF MIDDLE THIRD OF ESOPHAGUS (HCC): ICD-10-CM

## 2018-10-11 PROCEDURE — 0DB28ZX EXCISION OF MIDDLE ESOPHAGUS, VIA NATURAL OR ARTIFICIAL OPENING ENDOSCOPIC, DIAGNOSTIC: ICD-10-PCS | Performed by: INTERNAL MEDICINE

## 2018-10-11 PROCEDURE — 0DB18ZX EXCISION OF UPPER ESOPHAGUS, VIA NATURAL OR ARTIFICIAL OPENING ENDOSCOPIC, DIAGNOSTIC: ICD-10-PCS | Performed by: INTERNAL MEDICINE

## 2018-10-11 PROCEDURE — 82962 GLUCOSE BLOOD TEST: CPT

## 2018-10-11 PROCEDURE — 88305 TISSUE EXAM BY PATHOLOGIST: CPT | Performed by: INTERNAL MEDICINE

## 2018-10-11 RX ORDER — LIDOCAINE HYDROCHLORIDE 10 MG/ML
INJECTION, SOLUTION EPIDURAL; INFILTRATION; INTRACAUDAL; PERINEURAL AS NEEDED
Status: DISCONTINUED | OUTPATIENT
Start: 2018-10-11 | End: 2018-10-11 | Stop reason: SURG

## 2018-10-11 RX ORDER — DEXTROSE MONOHYDRATE 25 G/50ML
50 INJECTION, SOLUTION INTRAVENOUS
Status: DISCONTINUED | OUTPATIENT
Start: 2018-10-11 | End: 2018-10-11

## 2018-10-11 RX ORDER — SODIUM CHLORIDE, SODIUM LACTATE, POTASSIUM CHLORIDE, CALCIUM CHLORIDE 600; 310; 30; 20 MG/100ML; MG/100ML; MG/100ML; MG/100ML
INJECTION, SOLUTION INTRAVENOUS CONTINUOUS
Status: DISCONTINUED | OUTPATIENT
Start: 2018-10-11 | End: 2018-10-11

## 2018-10-11 RX ORDER — NALOXONE HYDROCHLORIDE 0.4 MG/ML
80 INJECTION, SOLUTION INTRAMUSCULAR; INTRAVENOUS; SUBCUTANEOUS AS NEEDED
Status: DISCONTINUED | OUTPATIENT
Start: 2018-10-11 | End: 2018-10-11

## 2018-10-11 RX ADMIN — SODIUM CHLORIDE, SODIUM LACTATE, POTASSIUM CHLORIDE, CALCIUM CHLORIDE: 600; 310; 30; 20 INJECTION, SOLUTION INTRAVENOUS at 09:13:00

## 2018-10-11 RX ADMIN — LIDOCAINE HYDROCHLORIDE 50 MG: 10 INJECTION, SOLUTION EPIDURAL; INFILTRATION; INTRACAUDAL; PERINEURAL at 09:13:00

## 2018-10-11 RX ADMIN — SODIUM CHLORIDE, SODIUM LACTATE, POTASSIUM CHLORIDE, CALCIUM CHLORIDE: 600; 310; 30; 20 INJECTION, SOLUTION INTRAVENOUS at 09:30:00

## 2018-10-11 RX ADMIN — LIDOCAINE HYDROCHLORIDE 50 MG: 10 INJECTION, SOLUTION EPIDURAL; INFILTRATION; INTRACAUDAL; PERINEURAL at 09:15:00

## 2018-10-11 NOTE — ANESTHESIA PREPROCEDURE EVALUATION
Anesthesia PreOp Note    HPI:     Shakeel Sanches is a [de-identified]year old male who presents for preoperative consultation requested by: Nobie Brittle, MD    Date of Surgery: 10/11/2018    Procedure(s):  ESOPHAGOGASTRODUODENOSCOPY (EGD)  Indication: Malignant neopla Noted: 12/08/2014      Laceration         Date Noted: 12/08/2014      Thrombocytopenia ([facility]         Date Noted: 06/09/2014      Abnormal blood chemistry         Date Noted: 03/12/2013        Past Medical History:   Diagnosis Date   • Chicken pox    • [place] Taking   Ferrous Sulfate 324 (65 Fe) MG Oral Tab EC Take 1 tablet by mouth 2 (two) times daily. Disp:  Rfl: 3 Taking   METFORMIN HCL 1000 MG Oral Tab TAKE 1 TABLET (1,000 MG TOTAL) BY MOUTH 2 (TWO) TIMES DAILY WITH MEALS.  Disp: 180 tablet Rfl: 0 Taking   o Suicide History Brother 37   • Diabetes Neg    • Glaucoma Neg    • Clotting Disorder Neg      Social History    Socioeconomic History      Marital status:       Spouse name: Not on file      Number of children: 5      Years of education: Not on file reviewed.   Lab Results   Component Value Date    WBC 3.7 (L) 09/21/2018    RBC 3.46 (L) 09/21/2018    HGB 7.8 (L) 09/21/2018    HCT 25.5 (L) 09/21/2018    MCV 73.9 (L) 09/21/2018    MCH 22.5 (L) 09/21/2018    MCHC 30.4 (L) 09/21/2018    RDW 18.4 (H) 09/21/

## 2018-10-11 NOTE — ANESTHESIA POSTPROCEDURE EVALUATION
Patient: Shakeel Sanches    Procedure Summary     Date:  10/11/18 Room / Location:  [facility] ENDOSCOPY 01 / [facility] ENDOSCOPY    Anesthesia Start:  1689 Anesthesia Stop:  9656    Procedure:  ESOPHAGOGASTRODUODENOSCOPY (EGD) (N/A ) Diagnosis:       Malignant neoplasm of

## 2018-10-11 NOTE — OPERATIVE REPORT
OPERATIVE REPORT   PATIENT NAME: Dawn Friday  MRN: [id number]  DATE OF OPERATION: 10/11/2018  PREOPERATIVE DIAGNOSIS:   1. 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   Patient underwent endoscopic m results at discharge. FINDINGS:  1. Upon entering the esophagus there is some spasm encountered in the esophagus however with air insufflation and some water irrigation we able to distend the esophageal lumen.   There is some superficial erosions above

## 2018-10-11 NOTE — H&P
[facility] [address] [facility] Department of  Gastroenterology  Update Health History :       Andrey Seat  male   Karyn Brittle, MD     [id number]  2/20/1940 Primary Care Physician  Nona Lisa MD        HPI :  History of squamous cell carcinoma of the eso Relation Age of Onset   • Cancer Mother 79        lung cancer; tobacco related   • Other (Unknown cause) Brother 61   • Other (WWII) Father 46        Cause of death   • Other (Other) Maternal Grandmother    • Other (Other) Maternal Grandfather    • Other ( DELICA LANCETS 30F Does not apply Misc Use to test blood sugar once daily Disp: 100 each Rfl: 2       Review of Symptoms:  A comprehensive 10 point review of systems was completed.     /59 (BP Location: Left arm)   Pulse 59   Resp 17   Ht 6' (1.829 m)

## 2018-10-13 VITALS
BODY MASS INDEX: 32.51 KG/M2 | OXYGEN SATURATION: 92 % | DIASTOLIC BLOOD PRESSURE: 63 MMHG | HEIGHT: 72 IN | SYSTOLIC BLOOD PRESSURE: 123 MMHG | HEART RATE: 62 BPM | RESPIRATION RATE: 18 BRPM | WEIGHT: 240 LBS

## 2018-10-16 NOTE — PROGRESS NOTES
Recurrent cancer. Message sent to oncology. Will need follow up with onc to discuss further management.

## 2018-10-17 ENCOUNTER — APPOINTMENT (OUTPATIENT)
Dept: HEMATOLOGY/ONCOLOGY | Facility: HOSPITAL | Age: 78
End: 2018-10-17
Attending: INTERNAL MEDICINE
Payer: COMMERCIAL

## 2018-10-17 ENCOUNTER — TELEPHONE (OUTPATIENT)
Dept: HEMATOLOGY/ONCOLOGY | Facility: HOSPITAL | Age: 78
End: 2018-10-17

## 2018-10-17 NOTE — TELEPHONE ENCOUNTER
Called patient asking him to please call and reschedule follow up appointment with , also needs labs prior to appt

## 2018-10-22 ENCOUNTER — TELEPHONE (OUTPATIENT)
Dept: HEMATOLOGY/ONCOLOGY | Facility: HOSPITAL | Age: 78
End: 2018-10-22

## 2018-10-22 NOTE — TELEPHONE ENCOUNTER
Call placed to make f/u appt with Dr Stephane Zambrano. Vin Mcnair is awaiting call from Dr Jose Hanna re results. Will call back to make f/u.

## 2018-10-23 DIAGNOSIS — E11.9 TYPE 2 DIABETES MELLITUS WITHOUT COMPLICATION, WITHOUT LONG-TERM CURRENT USE OF INSULIN (HCC): ICD-10-CM

## 2018-10-23 RX ORDER — GLIMEPIRIDE 4 MG/1
TABLET ORAL
Qty: 180 TABLET | Refills: 2 | Status: ON HOLD | OUTPATIENT
Start: 2018-10-23 | End: 2018-11-24

## 2018-10-24 NOTE — PROGRESS NOTES
Patient wife notified with the biopsy results. They have an appointment with Dr. Chidi Garza his oncologist tomorrow. Consideration for brachytherapy.

## 2018-10-25 ENCOUNTER — LAB ENCOUNTER (OUTPATIENT)
Dept: LAB | Facility: HOSPITAL | Age: 78
End: 2018-10-25
Attending: INTERNAL MEDICINE
Payer: COMMERCIAL

## 2018-10-25 ENCOUNTER — OFFICE VISIT (OUTPATIENT)
Dept: HEMATOLOGY/ONCOLOGY | Facility: HOSPITAL | Age: 78
End: 2018-10-25
Attending: INTERNAL MEDICINE
Payer: COMMERCIAL

## 2018-10-25 VITALS
BODY MASS INDEX: 33.18 KG/M2 | HEIGHT: 72 IN | RESPIRATION RATE: 18 BRPM | WEIGHT: 245 LBS | HEART RATE: 72 BPM | TEMPERATURE: 98 F | DIASTOLIC BLOOD PRESSURE: 50 MMHG | SYSTOLIC BLOOD PRESSURE: 143 MMHG

## 2018-10-25 DIAGNOSIS — Z87.891 HISTORY OF TOBACCO USE: ICD-10-CM

## 2018-10-25 DIAGNOSIS — C15.4 MALIGNANT NEOPLASM OF MIDDLE THIRD OF ESOPHAGUS (HCC): Primary | ICD-10-CM

## 2018-10-25 DIAGNOSIS — R16.2 HEPATOSPLENOMEGALY: ICD-10-CM

## 2018-10-25 DIAGNOSIS — E11.8 TYPE 2 DIABETES MELLITUS WITH COMPLICATION, WITHOUT LONG-TERM CURRENT USE OF INSULIN (HCC): ICD-10-CM

## 2018-10-25 DIAGNOSIS — C16.9 GASTRIC CANCER (HCC): Primary | ICD-10-CM

## 2018-10-25 DIAGNOSIS — Z87.898 HISTORY OF ALCOHOL USE: ICD-10-CM

## 2018-10-25 DIAGNOSIS — C15.4 MALIGNANT NEOPLASM OF MIDDLE THIRD OF ESOPHAGUS (HCC): ICD-10-CM

## 2018-10-25 DIAGNOSIS — C16.9 GASTRIC CANCER (HCC): ICD-10-CM

## 2018-10-25 DIAGNOSIS — D69.6 THROMBOCYTOPENIA (HCC): ICD-10-CM

## 2018-10-25 PROCEDURE — 84466 ASSAY OF TRANSFERRIN: CPT

## 2018-10-25 PROCEDURE — 83540 ASSAY OF IRON: CPT

## 2018-10-25 PROCEDURE — 86301 IMMUNOASSAY TUMOR CA 19-9: CPT

## 2018-10-25 PROCEDURE — 86901 BLOOD TYPING SEROLOGIC RH(D): CPT

## 2018-10-25 PROCEDURE — 80053 COMPREHEN METABOLIC PANEL: CPT

## 2018-10-25 PROCEDURE — 82378 CARCINOEMBRYONIC ANTIGEN: CPT

## 2018-10-25 PROCEDURE — 86900 BLOOD TYPING SEROLOGIC ABO: CPT

## 2018-10-25 PROCEDURE — 83036 HEMOGLOBIN GLYCOSYLATED A1C: CPT

## 2018-10-25 PROCEDURE — 85025 COMPLETE CBC W/AUTO DIFF WBC: CPT

## 2018-10-25 PROCEDURE — 82728 ASSAY OF FERRITIN: CPT

## 2018-10-25 PROCEDURE — 86850 RBC ANTIBODY SCREEN: CPT

## 2018-10-25 PROCEDURE — 36415 COLL VENOUS BLD VENIPUNCTURE: CPT

## 2018-10-25 PROCEDURE — 99215 OFFICE O/P EST HI 40 MIN: CPT | Performed by: INTERNAL MEDICINE

## 2018-10-25 NOTE — PROGRESS NOTES
[facility] Progress Note    Patient Name: Albert Holliday   YOB: 1940   Medical Record Number: [id number]   CSN: [id number]   Consulting Physician: Karuna Yan MD  Referring Physician(s): Alexandria Lomas  Date of Visit: 10/25/2018      procedure with EMR in October after having it performed in 6/2018. His repeat procedure shows 2 areas concerning for invasive esophageal squamous cell carcinoma. Pt denies signs of blood loss from any orifice.   Elier Headley feels that food is going down well, ENDOSCOPY   • ESOPHAGOGASTRODUODENOSCOPY (EGD) N/A 6/21/2018    Performed by Negro Murray MD at [address] [place] [address] ENDOSCOPY   • ESOPHAGOGASTRODUODENOSCOPY (EGD) N/A 5/24/2018    Performed by Peter Pittman MD at [address] [place] [address] ENDOSCOPY   • KIDNEY SURGERY      open kidney s  Service: Not Asked        Blood Transfusions: Not Asked        Caffeine Concern: Yes          3 cups of coffee daily         Occupational Exposure: Not Asked        Hobby Hazards: Not Asked        Sleep Concern: Not Asked        Stress Concern: tablet Rfl: 3   nateglinide 120 MG Oral Tab Take 1 tablet (120 mg total) by mouth 2 (two) times daily with meals. Disp: 180 tablet Rfl: 1   Losartan Potassium 50 MG Oral Tab Take 1 tablet (50 mg total) by mouth daily.  Disp: 90 tablet Rfl: 3   Blood Glucose regions. Chest: Clear to auscultation. No wheezes or rales. Heart: Regular rate and rhythm. S1S2 normal.  Abdomen: Soft, non tender with good bowel sounds. No hepatosplenomegaly. No palpable mass.   Large protuberant abdomen  Extremities: No edema or ca alcohol use    (D69.6) Thrombocytopenia (HCC)    (P50.273) History of tobacco use  [de-identified]year old male with several comorbid conditions including prior history of heavy alcohol and tobacco use in the past with evidence of hepatosplenomegaly and thrombocytopen tobacco and alcohol use    --Discussed with pt and wife that these two agents likely produced his SCC of the esophagus    4.) Iron deficiency anemia    --rec transfusion for hgb <7 g/dl, doesn't need a pRBC transfusion  --planning to treat with IV iron usi

## 2018-10-26 ENCOUNTER — OFFICE VISIT (OUTPATIENT)
Dept: RADIATION ONCOLOGY | Facility: HOSPITAL | Age: 78
End: 2018-10-26
Attending: RADIOLOGY
Payer: COMMERCIAL

## 2018-10-26 VITALS
SYSTOLIC BLOOD PRESSURE: 139 MMHG | TEMPERATURE: 98 F | BODY MASS INDEX: 33 KG/M2 | WEIGHT: 243.63 LBS | RESPIRATION RATE: 18 BRPM | OXYGEN SATURATION: 96 % | HEART RATE: 72 BPM | DIASTOLIC BLOOD PRESSURE: 53 MMHG

## 2018-10-26 PROCEDURE — 99212 OFFICE O/P EST SF 10 MIN: CPT

## 2018-10-26 RX ORDER — DOCUSATE SODIUM 100 MG/1
100 CAPSULE, LIQUID FILLED ORAL DAILY
COMMUNITY

## 2018-10-26 NOTE — PROGRESS NOTES
Nursing Consultation Note  Patient: Yesi Gresham  YOB: 1940  Age: [de-identified]year old  Radiation Oncologist: Dr. Adriana Smith  Referring Physician: Rigo Bedoya  Diagnosis:No diagnosis found.   Consult Date: 10/26/2018      Chemotherapy: no  Labs: Re Oral Tab EC TAKE 1 TABLET BY MOUTH TWICE A DAY Disp: 60 tablet Rfl: 3   METFORMIN HCL 1000 MG Oral Tab TAKE 1 TABLET (1,000 MG TOTAL) BY MOUTH 2 (TWO) TIMES DAILY WITH MEALS.  Disp: 180 tablet Rfl: 0   omeprazole 20 MG Oral Capsule Delayed Release Take 1 ca MD YESI;  Location: [facility] ENDOSCOPY   • COLONOSCOPY     • COLONOSCOPY N/A 5/24/2018    Performed by Rosetta Dietrich MD at [facility] ENDOSCOPY   • COLONOSCOPY & POLYPECTOMY  4/16/15   • ENDOSCOPIC ULTRASOUND (EUS) N/A 6/21/2018    Performed by Pramod Ortiz MD a Not Asked        Blood Transfusions: Not Asked        Caffeine Concern: Yes          3 cups of coffee daily         Occupational Exposure: Not Asked        Hobby Hazards: Not Asked        Sleep Concern: Not Asked        Stress Concern: Not Asked        Therese Allen therapy    Expected Outcomes:  Knowledge of radiation therapy    Progress Toward Outcome:  Making progress    Pamphlets/Handouts Given to Patient:  Radiation process. Pt came in accompanied by his wife. Medical history reviewed.   Medications reviewe

## 2018-10-26 NOTE — CONSULTS
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   Per our discussio Rfl:     GLIMEPIRIDE 4 MG Oral Tab TAKE 1 TABLET BY MOUTH TWICE A DAY Disp: 180 tablet Rfl: 2   Glucose Blood (ONETOUCH ULTRA BLUE) In Vitro Strip TEST ONCE DAILY Disp: 100 strip Rfl: 2   omeprazole 20 MG Oral Capsule Delayed Release Take 1 capsule (20 mg t Nephrolithiasis, Thrombocytopenia ([facility] [address] and Visual impairment.     PAST SURGICAL HISTORY:   has a past surgical history that includes removal of kidney stone (Left, 1985) (Open); colonoscopy & polypectomy (4/16/15); upper gi endoscopy,biopsy (4/16/15); lap XRT.    Pt has squamous cell carcinoma of the esophagus arising in the nodule at approximately 35 cm from incisors, s/p endoscopic mucosal resection  in June 2018 and has recurrent dz. He is not an ideal candidate for concurrent C/RT or surgery. squamous cell carcinoma in the proximal esophageal specimen is unclear in significance, especially in the context of multiple other benign fragments of squamous mucosa.   It may represent carryover from the previous biopsy specimen or the presence of squamo 5.90 M/UL    HGB 11.0 (L) 13.5 - 17.5 g/dL    HCT 34.2 (L) 41.0 - 52.0 %    MCV 82.1 80.0 - 100.0 fL    MCH 26.5 (L) 27.0 - 32.0 pg    MCHC 32.2 32.0 - 37.0 g/dl    RDW 31.1 (H) 11.0 - 15.0 %    PLT 86 (L) 140 - 400 K/UL    MPV 8.6 7.4 - 10.3 fL    Neutrop SUV, and hepatic blood pool is 3.3 max SUV. HEAD/NECK:    Normal.  No pathologic FDG activity. LUNGS:             There is a calcified granuloma in the right middle lobe (image 150). No pathologic FDG activity. No FDG avid pulmonary nodules.     ME current or recent infectious/ inflammatory process, please correlate clinically              Dictated by (CST): Nasim Cortez MD on 6/15/2018 at 14:44       Approved by (CST): Nasim Cortez MD on 6/15/2018 at 15:32

## 2018-10-29 PROCEDURE — 77332 RADIATION TREATMENT AID(S): CPT | Performed by: RADIOLOGY

## 2018-10-29 PROCEDURE — 77334 RADIATION TREATMENT AID(S): CPT | Performed by: RADIOLOGY

## 2018-10-30 PROCEDURE — 77399 UNLISTED PX MED RADJ PHYSICS: CPT | Performed by: RADIOLOGY

## 2018-11-01 ENCOUNTER — APPOINTMENT (OUTPATIENT)
Dept: RADIATION ONCOLOGY | Facility: HOSPITAL | Age: 78
End: 2018-11-01
Attending: RADIOLOGY
Payer: COMMERCIAL

## 2018-11-02 RX ORDER — NATEGLINIDE 120 MG/1
120 TABLET, COATED ORAL 2 TIMES DAILY WITH MEALS
Qty: 180 TABLET | Refills: 1 | Status: ON HOLD | OUTPATIENT
Start: 2018-11-02 | End: 2018-11-24

## 2018-11-05 ENCOUNTER — OFFICE VISIT (OUTPATIENT)
Dept: INTERNAL MEDICINE CLINIC | Facility: CLINIC | Age: 78
End: 2018-11-05
Payer: COMMERCIAL

## 2018-11-05 VITALS
BODY MASS INDEX: 33.18 KG/M2 | DIASTOLIC BLOOD PRESSURE: 70 MMHG | HEIGHT: 72 IN | SYSTOLIC BLOOD PRESSURE: 136 MMHG | HEART RATE: 76 BPM | WEIGHT: 245 LBS | RESPIRATION RATE: 16 BRPM

## 2018-11-05 DIAGNOSIS — E13.311 MACULAR EDEMA DUE TO SECONDARY DIABETES (HCC): ICD-10-CM

## 2018-11-05 DIAGNOSIS — C15.9 SQUAMOUS CELL ESOPHAGEAL CANCER (HCC): ICD-10-CM

## 2018-11-05 DIAGNOSIS — E11.9 TYPE 2 DIABETES MELLITUS WITHOUT COMPLICATION, WITHOUT LONG-TERM CURRENT USE OF INSULIN (HCC): Primary | ICD-10-CM

## 2018-11-05 DIAGNOSIS — I10 ESSENTIAL HYPERTENSION WITH GOAL BLOOD PRESSURE LESS THAN 130/85: ICD-10-CM

## 2018-11-05 DIAGNOSIS — Z23 NEED FOR VACCINATION: ICD-10-CM

## 2018-11-05 PROCEDURE — 77300 RADIATION THERAPY DOSE PLAN: CPT | Performed by: RADIOLOGY

## 2018-11-05 PROCEDURE — 99214 OFFICE O/P EST MOD 30 MIN: CPT | Performed by: INTERNAL MEDICINE

## 2018-11-05 PROCEDURE — 90653 IIV ADJUVANT VACCINE IM: CPT | Performed by: INTERNAL MEDICINE

## 2018-11-05 PROCEDURE — 77293 RESPIRATOR MOTION MGMT SIMUL: CPT | Performed by: RADIOLOGY

## 2018-11-05 PROCEDURE — 77301 RADIOTHERAPY DOSE PLAN IMRT: CPT | Performed by: RADIOLOGY

## 2018-11-05 PROCEDURE — 90471 IMMUNIZATION ADMIN: CPT | Performed by: INTERNAL MEDICINE

## 2018-11-05 PROCEDURE — 99212 OFFICE O/P EST SF 10 MIN: CPT | Performed by: INTERNAL MEDICINE

## 2018-11-05 PROCEDURE — 77338 DESIGN MLC DEVICE FOR IMRT: CPT | Performed by: RADIOLOGY

## 2018-11-05 NOTE — PROGRESS NOTES
Sylvia Nguyen is a [de-identified]year old male. HPI:   1. Type 2 diabetes mellitus without complication (HCC)    The patient has been taking all prescribed diabetic medications at home and has been following a diabetic diet.  Recent HgA1c was 12.4 Patient advised to fo DAY Disp: 180 tablet Rfl: 2   Glucose Blood (ONETOUCH ULTRA BLUE) In Vitro Strip TEST ONCE DAILY Disp: 100 strip Rfl: 2   omeprazole 20 MG Oral Capsule Delayed Release Take 1 capsule (20 mg total) by mouth 2 (two) times daily before meals.  Disp: 120 capsul Alcohol use:  Yes      Alcohol/week: 0.0 oz      Comment: Occasionally; very seldom use, previous heavy use in the past    Drug use: No       REVIEW OF SYSTEMS:   GENERAL HEALTH: feels well otherwise  SKIN: denies any unusual skin lesions or rashes  RESPIRA been seeing Dr Richard Ramirez for macular edema.    - OPHTHALMOLOGY - INTERNAL    4 [facility]    Mendez see Dr Dianna Marie in next week for endoscopic procedure to check on ulcer in his stomach. To get XRT started soon.      The patient indica

## 2018-11-07 PROCEDURE — 77386 HC IMRT COMPLEX: CPT | Performed by: RADIOLOGY

## 2018-11-08 ENCOUNTER — DIETICIAN VISIT (OUTPATIENT)
Dept: NUTRITION | Facility: HOSPITAL | Age: 78
End: 2018-11-08

## 2018-11-08 VITALS — BODY MASS INDEX: 33 KG/M2 | WEIGHT: 245.81 LBS

## 2018-11-08 PROCEDURE — 77386 HC IMRT COMPLEX: CPT | Performed by: RADIOLOGY

## 2018-11-08 NOTE — PROGRESS NOTES
Oncology Nutrition Assessment    Ht Readings from Last 1 Encounters:  11/05/18 : 182.9 cm (6')      Wt Readings from Last 1 Encounters:  11/08/18 : 111.5 kg (245 lb 12.8 oz)    BMI Calculated: Body mass index is 33.34 kg/m². Weight History:   Wt Readings

## 2018-11-09 PROCEDURE — 77386 HC IMRT COMPLEX: CPT | Performed by: RADIOLOGY

## 2018-11-09 PROCEDURE — 77336 RADIATION PHYSICS CONSULT: CPT | Performed by: RADIOLOGY

## 2018-11-12 ENCOUNTER — OFFICE VISIT (OUTPATIENT)
Dept: RADIATION ONCOLOGY | Facility: HOSPITAL | Age: 78
End: 2018-11-12
Attending: RADIOLOGY
Payer: COMMERCIAL

## 2018-11-12 VITALS
HEIGHT: 70 IN | BODY MASS INDEX: 35.22 KG/M2 | WEIGHT: 246 LBS | SYSTOLIC BLOOD PRESSURE: 137 MMHG | RESPIRATION RATE: 18 BRPM | HEART RATE: 79 BPM | DIASTOLIC BLOOD PRESSURE: 59 MMHG

## 2018-11-12 DIAGNOSIS — C15.9 SQUAMOUS CELL ESOPHAGEAL CANCER (HCC): Primary | ICD-10-CM

## 2018-11-12 PROCEDURE — 77386 HC IMRT COMPLEX: CPT | Performed by: RADIOLOGY

## 2018-11-12 NOTE — PROGRESS NOTES
[facility] Radiation Treatment Management Note 1-5    Patient:  Laila Martinez  Age:  [de-identified]year old  Visit Diagnosis:    1.  Squamous cell esophageal cancer (HCC)      Primary Rad/Onc:  Dr. Tc Campo Myles    Site Delivered Dose (Gy) Prescribed Do

## 2018-11-13 PROCEDURE — 77386 HC IMRT COMPLEX: CPT | Performed by: RADIOLOGY

## 2018-11-14 PROCEDURE — 77386 HC IMRT COMPLEX: CPT | Performed by: RADIOLOGY

## 2018-11-15 ENCOUNTER — DIETICIAN VISIT (OUTPATIENT)
Dept: NUTRITION | Facility: HOSPITAL | Age: 78
End: 2018-11-15

## 2018-11-15 VITALS — BODY MASS INDEX: 35 KG/M2 | WEIGHT: 245.81 LBS

## 2018-11-15 PROCEDURE — 77386 HC IMRT COMPLEX: CPT | Performed by: RADIOLOGY

## 2018-11-15 NOTE — PROGRESS NOTES
Oncology Nutrition Assessment    Ht Readings from Last 1 Encounters:  11/12/18 : 177.8 cm (5' 10\")      Wt Readings from Last 1 Encounters:  11/15/18 : 111.5 kg (245 lb 12.8 oz)    BMI Calculated: Body mass index is 35.27 kg/m². Weight History:   Wt Read intake.   Gregorio [address], [phone] [facility] Dietitian [id number]

## 2018-11-16 PROCEDURE — 77386 HC IMRT COMPLEX: CPT | Performed by: RADIOLOGY

## 2018-11-16 PROCEDURE — 77336 RADIATION PHYSICS CONSULT: CPT | Performed by: RADIOLOGY

## 2018-11-18 PROCEDURE — 77386 HC IMRT COMPLEX: CPT | Performed by: RADIOLOGY

## 2018-11-19 ENCOUNTER — OFFICE VISIT (OUTPATIENT)
Dept: RADIATION ONCOLOGY | Facility: HOSPITAL | Age: 78
End: 2018-11-19
Attending: RADIOLOGY
Payer: COMMERCIAL

## 2018-11-19 VITALS
RESPIRATION RATE: 18 BRPM | HEART RATE: 78 BPM | BODY MASS INDEX: 34.79 KG/M2 | SYSTOLIC BLOOD PRESSURE: 137 MMHG | HEIGHT: 70 IN | DIASTOLIC BLOOD PRESSURE: 56 MMHG | TEMPERATURE: 98 F | WEIGHT: 243 LBS

## 2018-11-19 DIAGNOSIS — C15.9 SQUAMOUS CELL ESOPHAGEAL CANCER (HCC): Primary | ICD-10-CM

## 2018-11-19 PROCEDURE — 77386 HC IMRT COMPLEX: CPT | Performed by: RADIOLOGY

## 2018-11-19 NOTE — PROGRESS NOTES
[facility] Radiation Treatment Management Note 6-10    Patient:  Leonardo Dominique  Age:  [de-identified]year old  Visit Diagnosis:    1.  Squamous cell esophageal cancer (HCC)      Primary Rad/Onc:  Dr. Elizabeth Tenorio Myles    Site Delivered Dose (Gy) Prescribed D

## 2018-11-20 PROCEDURE — 77386 HC IMRT COMPLEX: CPT | Performed by: RADIOLOGY

## 2018-11-21 PROCEDURE — 77336 RADIATION PHYSICS CONSULT: CPT | Performed by: RADIOLOGY

## 2018-11-21 PROCEDURE — 77386 HC IMRT COMPLEX: CPT | Performed by: RADIOLOGY

## 2018-11-22 ENCOUNTER — APPOINTMENT (OUTPATIENT)
Dept: GENERAL RADIOLOGY | Facility: HOSPITAL | Age: 78
End: 2018-11-22
Attending: EMERGENCY MEDICINE
Payer: COMMERCIAL

## 2018-11-22 ENCOUNTER — HOSPITAL ENCOUNTER (OUTPATIENT)
Facility: HOSPITAL | Age: 78
Setting detail: OBSERVATION
Discharge: HOME | End: 2018-11-24
Attending: EMERGENCY MEDICINE | Admitting: HOSPITALIST
Payer: COMMERCIAL

## 2018-11-22 DIAGNOSIS — R13.10 ODYNOPHAGIA: ICD-10-CM

## 2018-11-22 DIAGNOSIS — R13.10 DYSPHAGIA, UNSPECIFIED TYPE: Primary | ICD-10-CM

## 2018-11-22 PROBLEM — K22.2 ACUTE ESOPHAGEAL OBSTRUCTION: Status: ACTIVE | Noted: 2018-11-22

## 2018-11-22 PROCEDURE — 99220 INITIAL OBSERVATION CARE,LEVL III: CPT | Performed by: HOSPITALIST

## 2018-11-22 PROCEDURE — 71045 X-RAY EXAM CHEST 1 VIEW: CPT | Performed by: EMERGENCY MEDICINE

## 2018-11-22 RX ORDER — MORPHINE SULFATE 2 MG/ML
1 INJECTION, SOLUTION INTRAMUSCULAR; INTRAVENOUS EVERY 2 HOUR PRN
Status: DISCONTINUED | OUTPATIENT
Start: 2018-11-22 | End: 2018-11-24

## 2018-11-22 RX ORDER — SODIUM CHLORIDE 9 MG/ML
INJECTION, SOLUTION INTRAVENOUS CONTINUOUS
Status: ACTIVE | OUTPATIENT
Start: 2018-11-22 | End: 2018-11-22

## 2018-11-22 RX ORDER — ONDANSETRON 2 MG/ML
4 INJECTION INTRAMUSCULAR; INTRAVENOUS EVERY 6 HOURS PRN
Status: DISCONTINUED | OUTPATIENT
Start: 2018-11-22 | End: 2018-11-24

## 2018-11-22 RX ORDER — MORPHINE SULFATE 4 MG/ML
4 INJECTION, SOLUTION INTRAMUSCULAR; INTRAVENOUS EVERY 2 HOUR PRN
Status: DISCONTINUED | OUTPATIENT
Start: 2018-11-22 | End: 2018-11-24

## 2018-11-22 RX ORDER — DEXTROSE MONOHYDRATE 25 G/50ML
50 INJECTION, SOLUTION INTRAVENOUS AS NEEDED
Status: DISCONTINUED | OUTPATIENT
Start: 2018-11-22 | End: 2018-11-24

## 2018-11-22 RX ORDER — SODIUM CHLORIDE 0.9 % (FLUSH) 0.9 %
3 SYRINGE (ML) INJECTION AS NEEDED
Status: DISCONTINUED | OUTPATIENT
Start: 2018-11-22 | End: 2018-11-24

## 2018-11-22 RX ORDER — SODIUM CHLORIDE 9 MG/ML
INJECTION, SOLUTION INTRAVENOUS ONCE
Status: COMPLETED | OUTPATIENT
Start: 2018-11-22 | End: 2018-11-22

## 2018-11-22 RX ORDER — METOCLOPRAMIDE HYDROCHLORIDE 5 MG/ML
10 INJECTION INTRAMUSCULAR; INTRAVENOUS ONCE
Status: COMPLETED | OUTPATIENT
Start: 2018-11-22 | End: 2018-11-22

## 2018-11-22 RX ORDER — MORPHINE SULFATE 2 MG/ML
2 INJECTION, SOLUTION INTRAMUSCULAR; INTRAVENOUS EVERY 2 HOUR PRN
Status: DISCONTINUED | OUTPATIENT
Start: 2018-11-22 | End: 2018-11-24

## 2018-11-22 RX ORDER — DEXTROSE AND SODIUM CHLORIDE 5; .45 G/100ML; G/100ML
INJECTION, SOLUTION INTRAVENOUS CONTINUOUS
Status: DISCONTINUED | OUTPATIENT
Start: 2018-11-22 | End: 2018-11-24

## 2018-11-22 RX ORDER — ONDANSETRON 2 MG/ML
4 INJECTION INTRAMUSCULAR; INTRAVENOUS EVERY 4 HOURS PRN
Status: DISCONTINUED | OUTPATIENT
Start: 2018-11-22 | End: 2018-11-24

## 2018-11-22 NOTE — ED NOTES
Patient presents with difficulty swallowing and pain to esophagus and throat. Hx of esophageal CA and receiving radiation. Patient is speaking in full sentences and does not appear in any distress.

## 2018-11-22 NOTE — ED INITIAL ASSESSMENT (HPI)
Pt c/o difficulty swallowing, feeling as though his throat is \"locking up\" when he tries to swallow anything, including water. Pt has esophageal cancer and is receiving radiation.  +SOB, +pain

## 2018-11-22 NOTE — ED PROVIDER NOTES
Patient Seen in: [facility] AND [facility] Emergency Department    History   Patient presents with:  Swallowing Problem (gastrointestinal)    Stated Complaint: trouble swallowing     HPI    [de-identified] yo M with PMH DM, HTN, HL, HCV, esophageal CA now s/p fifth round of XR by Terra Smalls MD at [address] [place] [address] ENDOSCOPY   • KIDNEY SURGERY      open kidney stone removal   • LAPAROSCOPIC CHOLECYSTECTOMY  7/22/15   • NEEDLE BIOPSY LIVER  7/22/15   • REMOVAL OF KIDNEY STONE Left 1985    Open   • UPPER GI ENDOSCOPY,BIOPSY  4/16/15   • U Grandfather    • Suicide History Brother 37   • Diabetes Neg    • Glaucoma Neg    • Clotting Disorder Neg        Social History    Tobacco Use      Smoking status: Former Smoker        Packs/day: 2.00        Years: 40.00        Pack years: [de-identified]        Types: following components:       Result Value    Glucose 221 (*)     Sodium 134 (*)     All other components within normal limits   RAINBOW DRAW BLUE   RAINBOW DRAW LAVENDER   RAINBOW DRAW DARK GREEN   RAINBOW DRAW LIGHT GREEN   RAINBOW DRAW GOLD   RAINBOW DRAW gastroparesis. Pulse ox: 98%:Normal on RA, as interpreted by myself    Evaluation for odynophagia/dysphagia without drooling/stridor in nontoxic patient without overt neck crepitant/cutaneous changes. Will obtain CXR/labs and initiate symptomatic care.

## 2018-11-23 ENCOUNTER — ANESTHESIA (OUTPATIENT)
Dept: ENDOSCOPY | Facility: HOSPITAL | Age: 78
End: 2018-11-23
Payer: COMMERCIAL

## 2018-11-23 ENCOUNTER — TELEPHONE (OUTPATIENT)
Dept: HEMATOLOGY/ONCOLOGY | Facility: HOSPITAL | Age: 78
End: 2018-11-23

## 2018-11-23 ENCOUNTER — ANESTHESIA EVENT (OUTPATIENT)
Dept: ENDOSCOPY | Facility: HOSPITAL | Age: 78
End: 2018-11-23
Payer: COMMERCIAL

## 2018-11-23 PROCEDURE — 99226 SUBSEQUENT OBSERVATION CARE: CPT | Performed by: HOSPITALIST

## 2018-11-23 PROCEDURE — 0DB58ZX EXCISION OF ESOPHAGUS, VIA NATURAL OR ARTIFICIAL OPENING ENDOSCOPIC, DIAGNOSTIC: ICD-10-PCS | Performed by: INTERNAL MEDICINE

## 2018-11-23 RX ORDER — SUCRALFATE ORAL 1 G/10ML
1 SUSPENSION ORAL
Status: DISCONTINUED | OUTPATIENT
Start: 2018-11-23 | End: 2018-11-24

## 2018-11-23 RX ORDER — LOSARTAN POTASSIUM 50 MG/1
50 TABLET ORAL DAILY
Status: DISCONTINUED | OUTPATIENT
Start: 2018-11-24 | End: 2018-11-24

## 2018-11-23 RX ORDER — SODIUM CHLORIDE, SODIUM LACTATE, POTASSIUM CHLORIDE, CALCIUM CHLORIDE 600; 310; 30; 20 MG/100ML; MG/100ML; MG/100ML; MG/100ML
INJECTION, SOLUTION INTRAVENOUS CONTINUOUS PRN
Status: DISCONTINUED | OUTPATIENT
Start: 2018-11-23 | End: 2018-11-23 | Stop reason: SURG

## 2018-11-23 RX ORDER — PRAVASTATIN SODIUM 20 MG
20 TABLET ORAL NIGHTLY
Status: DISCONTINUED | OUTPATIENT
Start: 2018-11-23 | End: 2018-11-24

## 2018-11-23 RX ORDER — METOPROLOL SUCCINATE 50 MG/1
50 TABLET, EXTENDED RELEASE ORAL DAILY
Status: DISCONTINUED | OUTPATIENT
Start: 2018-11-24 | End: 2018-11-24

## 2018-11-23 RX ORDER — LIDOCAINE HYDROCHLORIDE 10 MG/ML
INJECTION, SOLUTION EPIDURAL; INFILTRATION; INTRACAUDAL; PERINEURAL AS NEEDED
Status: DISCONTINUED | OUTPATIENT
Start: 2018-11-23 | End: 2018-11-23 | Stop reason: SURG

## 2018-11-23 RX ADMIN — LIDOCAINE HYDROCHLORIDE 50 MG: 10 INJECTION, SOLUTION EPIDURAL; INFILTRATION; INTRACAUDAL; PERINEURAL at 10:51:00

## 2018-11-23 RX ADMIN — SODIUM CHLORIDE, SODIUM LACTATE, POTASSIUM CHLORIDE, CALCIUM CHLORIDE: 600; 310; 30; 20 INJECTION, SOLUTION INTRAVENOUS at 11:12:00

## 2018-11-23 RX ADMIN — SODIUM CHLORIDE, SODIUM LACTATE, POTASSIUM CHLORIDE, CALCIUM CHLORIDE: 600; 310; 30; 20 INJECTION, SOLUTION INTRAVENOUS at 10:50:00

## 2018-11-23 NOTE — RESPIRATORY THERAPY NOTE
JÚNIOR ASSESSMENT:    Pt does not have a previous diagnosis of JÚNIOR. Pt does not routinely use a CPAP device at home. This pt is suspected to be at high risk for JÚNIOR and sleep lab packet was provided to patient for outpatient follow-up. Consider sleep study.

## 2018-11-23 NOTE — ANESTHESIA POSTPROCEDURE EVALUATION
Patient: Omid Hopper    Procedure Summary     Date:  11/23/18 Room / Location:  [facility] ENDOSCOPY 01 / [facility] ENDOSCOPY    Anesthesia Start:  1498 Anesthesia Stop:      Procedure:  ESOPHAGOGASTRODUODENOSCOPY (EGD) (N/A ) Diagnosis:       Dysphagia, unspecified ty

## 2018-11-23 NOTE — ED NOTES
Orders for admission, patient is aware of plan, and ready to go upstairs. Any questions, please call NAUN Malone RN [phone]. Ax4, ambulatory patient from home. Resting comfortably in cart. Wife at bedside.

## 2018-11-23 NOTE — DIETARY NOTE
ADULT NUTRITION INITIAL ASSESSMENT    Pt is at moderate nutrition risk. Pt does not meet malnutrition criteria. RECOMMENDATIONS TO MD:  See Nutrition Intervention   oral nutrition supplementation initiated     ADMITTING DIAGNOSIS:   Odynophagia [R13. oz)   BMI: Body mass index is 34.16 kg/m².   BMI CLASSIFICATION: 30-34.9 kg/m2 - obesity class I  IBW: 166 lbs        143% IBW  Usual Body Wt: 245 lbs per wt hx      97% UBW    WEIGHT HISTORY:  Patient Weight(s) for the past 336 hrs:   Weight   11/22/18 201

## 2018-11-23 NOTE — OPERATIVE REPORT
ESOPHAGOGASTRODUODENOSCOPY REPORT    Patient Name:  [facility] Record #: [id number]  YOB: 1940  Date of Procedure: 11/23/2018    Referring physician: Brayan Townsend MD    Surgeon:  Josiah Prado MD    Pre-op diagnosis: Dys Noted severe esophagitis likely related to radiation therapy. There is a narrowing stricture at the level of prior EMR site not amenable to passage of upper endoscope but passed with pediatric upper endoscope. There were also varices distal to the site.

## 2018-11-23 NOTE — H&P
[facility]    PATIENT'S NAME: Supriya Herlinda   ATTENDING PHYSICIAN: Lorraine Wilson MD   PATIENT ACCOUNT#:   [id number]    LOCATION:  [place] [address]  MEDICAL RECORD #:   [id number]       YOB: 1940  ADMISSION DATE:       11/22/2018 burning sensation. He tried to drink fluid after that, but he threw up. Other 12-point review of systems is negative. PHYSICAL EXAMINATION:    GENERAL:  Alert, oriented to time, place, and person. Moderate distress.   VITAL SIGNS:  Temperature 98.3,

## 2018-11-23 NOTE — TELEPHONE ENCOUNTER
The nurse from the 4th floor called because this patient is currently in Room 443 inpatient. Due to be discharged Sunday. But the doctor said No radiation. So his appointment for radiation Monday should be cancel.  He also has a  appointment on Tue

## 2018-11-23 NOTE — CONSULTS
[facility]    Report of Consultation    Laila Martinez Patient Status:  Observation    1940 MRN [id number]   Location [facility] 4W/SW/SE Attending Deanna Oseguera MD   Hosp Day # 0 PCP Maria Dolores Goodman MD     Date of Admis ENDOSCOPY   • COLONOSCOPY & POLYPECTOMY  4/16/15   • ENDOSCOPIC ULTRASOUND (EUS) N/A 6/21/2018    Performed by Susy Velazquez MD at [address] [place] [address] ENDOSCOPY   • ESOPHAGOGASTRODUODENOSCOPY (EGD) N/A 10/11/2018    Performed by Susy Velazquez MD at [address] [place] [address] ENDOSCOPY   • Intravenous Once   dextrose 50 % injection 50 mL 50 mL Intravenous PRN   Glucose-Vitamin C (DEX-4) 4-6 GM-MG chewable tab 4 tablet 4 tablet Oral Q15 Min PRN   glucose (DEX4) oral liquid 15 g 15 g Oral Q15 Min PRN   Insulin Regular Human (NOVOLIN R) 100 UNI daily.    Blood Glucose Monitoring Suppl (ONETOUCH ULTRA MINI) w/Device Does not apply Kit Test blood sugar daily   Glucose Blood (ONETOUCH ULTRA BLUE) In Vitro Strip TEST ONCE DAILY   ONETOUCH DELICA LANCETS 21P Does not apply Misc Use to test blood sugar 08/13/2018    INR 1.3 (H) 08/15/2018    PTP 15.4 (H) 08/15/2018    TSH 4.78 06/02/2018    PSA 0.6 06/02/2018    DDIMER 2.55 (H) 08/13/2018    MG 1.5 (L) 05/13/2018    TROP 0.09 (HH) 08/15/2018     08/13/2018    B12 765 05/12/2018         Imaging:  Xr yesterday afternoon. Initially unable to tolerate liquids but after glucagon was able to tolerate liquids, still with pain. He most recently had EGD 10/11/18 noting small nodule distal to EMR site +for cancer.  Given symptoms, possible prior impaction will

## 2018-11-23 NOTE — ANESTHESIA PREPROCEDURE EVALUATION
Anesthesia PreOp Note    HPI:     Primo Arango is a [de-identified]year old male who presents for preoperative consultation requested by: Zofia Zaman MD    Date of Surgery: 11/22/2018 - 11/23/2018    Procedure(s):  ESOPHAGOGASTRODUODENOSCOPY (EGD)  Indication: dysp than 130/85         Date Noted: 11/07/2016      MGD (meibomian gland dysfunction)         Date Noted: 06/09/2016      Hyperopia with astigmatism and presbyopia         Date Noted: 06/09/2016      Mixed hyperlipidemia         Date Noted: 09/09/2015      Cielo • UPPER GI ENDOSCOPY,EXAM           Medications Prior to Admission:  NATEGLINIDE 120 MG Oral Tab TAKE 1 TABLET (120 MG TOTAL) BY MOUTH 2 (TWO) TIMES DAILY WITH MEALS.  Disp: 180 tablet Rfl: 1 Taking   METFORMIN HCL 1000 MG Oral Tab TAKE 1 TABLET (1,000 MG Rate: 62.5 mL/hr at 11/23/18 0829 40 mEq at 11/23/18 0829   dextrose 50 % injection 50 mL 50 mL Intravenous PRN Bella Course, MD     Glucose-Vitamin C (DEX-4) 4-6 GM-MG chewable tab 4 tablet 4 tablet Oral Q15 Min PRN Bella Course, MD     glucose (DEX Years of education: Not on file      Highest education level: Not on file    Social Needs      Financial resource strain: Not on file      Food insecurity - worry: Not on file      Food insecurity - inability: Not on file      Transportation needs - medica RDW 28.3 (H) 11/23/2018    PLT 61 (L) 11/23/2018    MPV 8.5 11/23/2018     Lab Results   Component Value Date     (L) 11/23/2018    K 3.6 11/23/2018     11/23/2018    CO2 26 11/23/2018    BUN 9 11/23/2018    CREATSERUM 0.63 11/23/2018    GLU 12 Brock  11/23/2018 10:26 AM

## 2018-11-23 NOTE — PROGRESS NOTES
[facility]  [facility]     Benito Tipton  : 1940    Status: Observation  Day #: 0    Attending: Abhilash Blount MD  PCP: Jaquan Terry MD      Assessment and Plan     Dysphagia  Severe esophagitis 2/2 radiation therapy  Esophageal GLU  221*  121*       Xr Chest Ap Portable  (cpt=71045)    Result Date: 11/22/2018  CONCLUSION:  1. Bibasilar atelectasis/scarring. No additional significant airspace disease or pleural effusion. 2. Mild cardiomegaly.    Dictated by (CST): Anibal Ron,

## 2018-11-24 VITALS
SYSTOLIC BLOOD PRESSURE: 119 MMHG | TEMPERATURE: 99 F | WEIGHT: 238.13 LBS | OXYGEN SATURATION: 94 % | RESPIRATION RATE: 20 BRPM | BODY MASS INDEX: 34.09 KG/M2 | DIASTOLIC BLOOD PRESSURE: 64 MMHG | HEIGHT: 70 IN | HEART RATE: 95 BPM

## 2018-11-24 PROCEDURE — 99217 OBSERVATION CARE DISCHARGE: CPT | Performed by: HOSPITALIST

## 2018-11-24 RX ORDER — PANTOPRAZOLE SODIUM 40 MG/1
40 TABLET, DELAYED RELEASE ORAL
Qty: 60 TABLET | Refills: 0 | Status: SHIPPED | OUTPATIENT
Start: 2018-11-24 | End: 2018-12-03

## 2018-11-24 RX ORDER — PANTOPRAZOLE SODIUM 40 MG/1
40 TABLET, DELAYED RELEASE ORAL
Status: DISCONTINUED | OUTPATIENT
Start: 2018-11-24 | End: 2018-11-24

## 2018-11-24 RX ORDER — ARIPIPRAZOLE 15 MG/1
40 TABLET ORAL EVERY 4 HOURS
Status: COMPLETED | OUTPATIENT
Start: 2018-11-24 | End: 2018-11-24

## 2018-11-24 RX ORDER — SUCRALFATE ORAL 1 G/10ML
1 SUSPENSION ORAL
Qty: 1200 ML | Refills: 0 | Status: ON HOLD | OUTPATIENT
Start: 2018-11-24 | End: 2018-12-06

## 2018-11-24 NOTE — PROGRESS NOTES
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

## 2018-11-24 NOTE — SPIRITUAL CARE NOTE
stopped to assist team in encouraging the patient to wait until his wife comes to take him home before he leaves the floor. Patient decided to sit and talk with us until his wife arrives.  left nursing station when RN arrived.  Patient was

## 2018-11-24 NOTE — DISCHARGE SUMMARY
[facility]  Discharge Summary     Sergioyle Session  : 1940    Status: Observation  Day #: 0    Attending: Gus Navarro MD  PCP: Esperanza Cid MD     Date of Admission: 2018  Date of Discharge: 2018     Hospital Discharge 1.6 oz (108 kg), SpO2 94 %.   General:  Alert, no distress  HEENT:  Normocephalic, atraumatic  Neck:  Supple, symmetrical  Cardiac:  Regular rate, regular rhythm  Pulmonary:  Clear to auscultation bilaterally, respirations unlabored  Gastrointestinal:  Soft simvastatin 10 MG Tabs  Commonly known as:  ZOCOR      Take 1 tablet (10 mg total) by mouth nightly. Quantity:  90 tablet  Refills:  3     Triamterene-HCTZ 37.5-25 MG Caps  Commonly known as:  DYAZIDE      TAKE 1 CAPSULE BY MOUTH EVERY MORNING.    Tanner Kelsey

## 2018-11-24 NOTE — PLAN OF CARE
Diabetes/Glucose Control    • Glucose maintained within prescribed range Adequate for Discharge        Impaired Swallowing    • Minimize aspiration risk Adequate for Discharge        PAIN - ADULT    • Verbalizes/displays adequate comfort level or patient's

## 2018-11-25 ENCOUNTER — TELEPHONE (OUTPATIENT)
Dept: OTHER | Facility: HOSPITAL | Age: 78
End: 2018-11-25

## 2018-11-27 ENCOUNTER — OFFICE VISIT (OUTPATIENT)
Dept: HEMATOLOGY/ONCOLOGY | Facility: HOSPITAL | Age: 78
End: 2018-11-27
Attending: INTERNAL MEDICINE
Payer: COMMERCIAL

## 2018-11-27 ENCOUNTER — TELEPHONE (OUTPATIENT)
Dept: INTERNAL MEDICINE CLINIC | Facility: CLINIC | Age: 78
End: 2018-11-27

## 2018-11-27 VITALS
TEMPERATURE: 98 F | DIASTOLIC BLOOD PRESSURE: 65 MMHG | RESPIRATION RATE: 18 BRPM | WEIGHT: 241.81 LBS | HEART RATE: 84 BPM | BODY MASS INDEX: 32.75 KG/M2 | HEIGHT: 72 IN | SYSTOLIC BLOOD PRESSURE: 130 MMHG

## 2018-11-27 DIAGNOSIS — Z87.898 HISTORY OF ALCOHOL USE: ICD-10-CM

## 2018-11-27 DIAGNOSIS — D69.6 THROMBOCYTOPENIA (HCC): ICD-10-CM

## 2018-11-27 DIAGNOSIS — R16.2 HEPATOSPLENOMEGALY: ICD-10-CM

## 2018-11-27 DIAGNOSIS — Z71.89 GOALS OF CARE, COUNSELING/DISCUSSION: ICD-10-CM

## 2018-11-27 DIAGNOSIS — Z87.19 H/O ESOPHAGEAL VARICES: ICD-10-CM

## 2018-11-27 DIAGNOSIS — C15.4 MALIGNANT NEOPLASM OF MIDDLE THIRD OF ESOPHAGUS (HCC): Primary | ICD-10-CM

## 2018-11-27 PROCEDURE — 99215 OFFICE O/P EST HI 40 MIN: CPT | Performed by: INTERNAL MEDICINE

## 2018-11-27 NOTE — PROGRESS NOTES
[facility] Progress Note    Patient Name: Misty Magaña   YOB: 1940   Medical Record Number: [id number]   CSN: [id number]   Consulting Physician: Rena Orantes MD  Referring Physician(s): Candida Paige  Date of Visit: 11/27/2018      procedure with EMR in October after having it performed in 6/2018. His repeat procedure shows 2 areas concerning for invasive esophageal squamous cell carcinoma.  Based on his more invasive disease involvement, pt was recommended for SBRT with Dr. Selvin Monroe. • COLONOSCOPY & POLYPECTOMY  4/16/15   • ENDOSCOPIC ULTRASOUND (EUS) N/A 6/21/2018    Performed by Tg Wolfe MD at [address] [place] [address] ENDOSCOPY   • ESOPHAGOGASTRODUODENOSCOPY (EGD) N/A 11/23/2018    Performed by Linda Carroll MD at [address] [place] [address] ENDOSCOPY   • Rosette Oleary Smoker        Packs/day: 2.00        Years: 40.00        Pack years: [de-identified]        Types: Cigarettes        Quit date: 1998        Years since quittin.9      Smokeless tobacco: Never Used    Substance and Sexual Activity      Alcohol use:  Yes        A Tablet 24 Hr TAKE 1 TABLET BY MOUTH EVERY DAY Disp: 90 tablet Rfl: 0   Melatonin 5 MG Oral Tab Take by mouth. Disp:  Rfl:    Triamterene-HCTZ 37.5-25 MG Oral Cap TAKE 1 CAPSULE BY MOUTH EVERY MORNING.  Disp: 90 capsule Rfl: 1   simvastatin 10 MG Oral Tab Ta Oropharynx is clear. Neck:  No palpable lymphadenopathy. Neck is supple. Lymphatics: There is no palpable lymphadenopathy throughout in the cervical, supraclavicular, axillary, or inguinal regions. Chest: Clear to auscultation. No wheezes or rales.   He radiation atypia. Clinical and endoscopic correlation with close clinical follow-up is recommended.            Impression:    (C15.4) Malignant neoplasm of middle third of esophagus ([address]  (primary encounter diagnosis)  Plan: OP REFERRAL TO PALLIATIVE CARE esophageal squamous cell carcinoma found by Dr. Janice Samuel.    --Past with patient and his wife that based on his cirrhosis, advanced age, and memory impairment issues would not be recommended for systemic chemotherapy  --He was therefore recommended for radia leukemia cells such as blasts on differential  -leukopenia like this can also be a feature of pts with ESLD/cirrhosis with alternating normal and lowered WBC values  -low clinical suspicion for an acute hematologic malignant process    6.) Esophageal stric

## 2018-11-30 ENCOUNTER — TELEPHONE (OUTPATIENT)
Dept: PALLIATIVE CARE | Facility: HOSPITAL | Age: 78
End: 2018-11-30

## 2018-11-30 NOTE — TELEPHONE ENCOUNTER
I called to offer pt outpatient palliative care appt, no answer, LM to call back #[phone].     Patric Land, ANP-BC, ACHPN

## 2018-12-01 ENCOUNTER — APPOINTMENT (OUTPATIENT)
Dept: RADIATION ONCOLOGY | Facility: HOSPITAL | Age: 78
End: 2018-12-01
Attending: RADIOLOGY
Payer: COMMERCIAL

## 2018-12-03 RX ORDER — TRIAMTERENE AND HYDROCHLOROTHIAZIDE 37.5; 25 MG/1; MG/1
CAPSULE ORAL
Qty: 90 CAPSULE | Refills: 1 | Status: SHIPPED | OUTPATIENT
Start: 2018-12-03 | End: 2019-01-01

## 2018-12-03 RX ORDER — METOPROLOL SUCCINATE 50 MG/1
TABLET, EXTENDED RELEASE ORAL
Qty: 90 TABLET | Refills: 0 | Status: SHIPPED | OUTPATIENT
Start: 2018-12-03 | End: 2019-01-01

## 2018-12-05 ENCOUNTER — ANESTHESIA EVENT (OUTPATIENT)
Dept: ENDOSCOPY | Facility: HOSPITAL | Age: 78
End: 2018-12-05

## 2018-12-06 ENCOUNTER — OFFICE VISIT (OUTPATIENT)
Dept: HEMATOLOGY/ONCOLOGY | Facility: HOSPITAL | Age: 78
End: 2018-12-06
Attending: INTERNAL MEDICINE
Payer: COMMERCIAL

## 2018-12-06 ENCOUNTER — HOSPITAL ENCOUNTER (OUTPATIENT)
Facility: HOSPITAL | Age: 78
Setting detail: HOSPITAL OUTPATIENT SURGERY
Discharge: HOME | End: 2018-12-06
Attending: INTERNAL MEDICINE | Admitting: INTERNAL MEDICINE
Payer: COMMERCIAL

## 2018-12-06 ENCOUNTER — ANESTHESIA (OUTPATIENT)
Dept: ENDOSCOPY | Facility: HOSPITAL | Age: 78
End: 2018-12-06

## 2018-12-06 VITALS
SYSTOLIC BLOOD PRESSURE: 135 MMHG | RESPIRATION RATE: 18 BRPM | WEIGHT: 243 LBS | DIASTOLIC BLOOD PRESSURE: 55 MMHG | HEART RATE: 86 BPM | BODY MASS INDEX: 32.91 KG/M2 | HEIGHT: 72 IN | TEMPERATURE: 97 F

## 2018-12-06 VITALS
WEIGHT: 243 LBS | BODY MASS INDEX: 32.91 KG/M2 | HEIGHT: 72 IN | OXYGEN SATURATION: 96 % | SYSTOLIC BLOOD PRESSURE: 111 MMHG | RESPIRATION RATE: 18 BRPM | DIASTOLIC BLOOD PRESSURE: 65 MMHG | HEART RATE: 80 BPM

## 2018-12-06 DIAGNOSIS — R13.10 DYSPHAGIA, UNSPECIFIED TYPE: ICD-10-CM

## 2018-12-06 DIAGNOSIS — C15.4 MALIGNANT NEOPLASM OF MIDDLE THIRD OF ESOPHAGUS (HCC): Primary | ICD-10-CM

## 2018-12-06 DIAGNOSIS — Z71.89 GOALS OF CARE, COUNSELING/DISCUSSION: ICD-10-CM

## 2018-12-06 LAB — GLUCOSE BLDC GLUCOMTR-MCNC: 200 MG/DL (ref 70–99)

## 2018-12-06 PROCEDURE — 82962 GLUCOSE BLOOD TEST: CPT

## 2018-12-06 PROCEDURE — 0DJ08ZZ INSPECTION OF UPPER INTESTINAL TRACT, VIA NATURAL OR ARTIFICIAL OPENING ENDOSCOPIC: ICD-10-PCS | Performed by: INTERNAL MEDICINE

## 2018-12-06 PROCEDURE — 99243 OFF/OP CNSLTJ NEW/EST LOW 30: CPT | Performed by: NURSE PRACTITIONER

## 2018-12-06 RX ORDER — LIDOCAINE HYDROCHLORIDE 10 MG/ML
INJECTION, SOLUTION EPIDURAL; INFILTRATION; INTRACAUDAL; PERINEURAL AS NEEDED
Status: DISCONTINUED | OUTPATIENT
Start: 2018-12-06 | End: 2018-12-06 | Stop reason: SURG

## 2018-12-06 RX ORDER — NALOXONE HYDROCHLORIDE 0.4 MG/ML
80 INJECTION, SOLUTION INTRAMUSCULAR; INTRAVENOUS; SUBCUTANEOUS AS NEEDED
Status: DISCONTINUED | OUTPATIENT
Start: 2018-12-06 | End: 2018-12-06

## 2018-12-06 RX ORDER — DEXTROSE MONOHYDRATE 25 G/50ML
50 INJECTION, SOLUTION INTRAVENOUS
Status: DISCONTINUED | OUTPATIENT
Start: 2018-12-06 | End: 2018-12-06

## 2018-12-06 RX ORDER — SODIUM CHLORIDE, SODIUM LACTATE, POTASSIUM CHLORIDE, CALCIUM CHLORIDE 600; 310; 30; 20 MG/100ML; MG/100ML; MG/100ML; MG/100ML
INJECTION, SOLUTION INTRAVENOUS CONTINUOUS PRN
Status: DISCONTINUED | OUTPATIENT
Start: 2018-12-06 | End: 2018-12-06 | Stop reason: SURG

## 2018-12-06 RX ORDER — PANTOPRAZOLE SODIUM 40 MG/1
40 TABLET, DELAYED RELEASE ORAL
Status: ON HOLD | COMMUNITY
End: 2018-12-06

## 2018-12-06 RX ORDER — SODIUM CHLORIDE, SODIUM LACTATE, POTASSIUM CHLORIDE, CALCIUM CHLORIDE 600; 310; 30; 20 MG/100ML; MG/100ML; MG/100ML; MG/100ML
INJECTION, SOLUTION INTRAVENOUS CONTINUOUS
Status: DISCONTINUED | OUTPATIENT
Start: 2018-12-06 | End: 2018-12-06

## 2018-12-06 RX ADMIN — SODIUM CHLORIDE, SODIUM LACTATE, POTASSIUM CHLORIDE, CALCIUM CHLORIDE: 600; 310; 30; 20 INJECTION, SOLUTION INTRAVENOUS at 14:27:00

## 2018-12-06 RX ADMIN — LIDOCAINE HYDROCHLORIDE 50 MG: 10 INJECTION, SOLUTION EPIDURAL; INFILTRATION; INTRACAUDAL; PERINEURAL at 14:29:00

## 2018-12-06 NOTE — H&P
[facility] Department of  Gastroenterology  Update Health History :       Bobby Briseno  male   Chilango Sanders MD     [id number]  2/20/1940 Primary Care Physician  Hammad Villatoro MD        HPI :  Squamous cell carcinoma of the esophagus stat Juan Diego Black MD at [facility] ENDOSCOPY   • ESOPHAGOGASTRODUODENOSCOPY (EGD) N/A 5/24/2018    Performed by Rina Sun MD at [facility] ENDOSCOPY   • KIDNEY SURGERY      open kidney stone removal   • LAPAROSCOPIC CHOLECYSTECTOMY  7/22/15   • NEEDLE BIOPSY LIVE simvastatin 10 MG Oral Tab Take 1 tablet (10 mg total) by mouth nightly. Disp: 90 tablet Rfl: 3   Losartan Potassium 50 MG Oral Tab Take 1 tablet (50 mg total) by mouth daily.  Disp: 90 tablet Rfl: 3   Blood Glucose Monitoring Suppl (ONETOUCH ULTRA MINI)

## 2018-12-06 NOTE — OPERATIVE REPORT
OPERATIVE REPORT   PATIENT NAME: Yg Baker  MRN: [id number]  DATE OF OPERATION: 12/6/2018  PREOPERATIVE DIAGNOSIS:   1.  Squamous cell carcinoma of the esophagus status post endoscopic mucosal resection followed by radiation to treat newly developed area discharge. FINDINGS:  1. Resolution of previously seen esophagitis. Slight narrowing of the esophageal lumen at the site of EMR without obvious recurrent disease.   The previously seen area of squamous cell cancer adjacent to the EMR site was not noted

## 2018-12-06 NOTE — ANESTHESIA PREPROCEDURE EVALUATION
Anesthesia PreOp Note    HPI:     Hortencia Hester is a [de-identified]year old male who presents for preoperative consultation requested by: Gui Qiu MD    Date of Surgery: 12/6/2018    Procedure(s):  ESOPHAGOGASTRODUODENOSCOPY (EGD)  Indication: Dysphagia, unspec less than 130/85         Date Noted: 11/07/2016      MGD (meibomian gland dysfunction)         Date Noted: 06/09/2016      Hyperopia with astigmatism and presbyopia         Date Noted: 06/09/2016      Mixed hyperlipidemia         Date Noted: 09/09/2015 (EGD) N/A 5/24/2018    Performed by Hui Arrieta MD at [address] [place] [address] ENDOSCOPY   • KIDNEY SURGERY      open kidney stone removal   • LAPAROSCOPIC CHOLECYSTECTOMY  7/22/15   • NEEDLE BIOPSY LIVER  7/22/15   • REMOVAL OF KIDNEY STONE Left 1985    Open   • UPPER G 79        lung cancer; tobacco related   • Other (Unknown cause) Brother 61   • Other (WWII) Father 46        Cause of death   • Other (Other) Maternal Grandmother    • Other (Other) Maternal Grandfather    • Other (Other) Paternal Grandmother    • Other ( is originally [place]. 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 He has been in the [place] since age 21. He and his wife live in [address] [place] [address]. Tori Pond enjoys watching television, hopes to ride a bicycle again soon.        Available pre-op labs possible dental damage if relevant, major complications, and any alternative forms of anesthetic management. All of the patient's questions were answered to the best of my ability. The patient desires the anesthetic management as planned.   Morris Meeks

## 2018-12-06 NOTE — PROGRESS NOTES
[address] [facility] Patient Status:  Medical Oncology Series    1940 MRN [id number]   Location [address] [facility] Palliative Care Provider PATEL Blanca     PCP [employer]: Jose Luis Nova tells us that he is feeling \"much much better. \" He was recently hospitalized her from 11/22/2018-11/24/2018 for dysphasia and severe esophagitis. The pain that brought him to the hospital at that time has subsided.  There is no pain or knows to call us if any bothersome symptoms arise. Goals of care counseling/discussion: I discussed reason for palliative care consultation.  I discussed the benefits of palliative care to include help with symptom management needs, provide extra layer form today with Janae Ortiz. She confirmed wishes for DNR/DNI and no feeding tube placement. Written patient education materials provided today: [facility] Teaching Sheet: Palliative Care and Hospice and Palliative Care brochure.       Medical History:  Past Me UPPER GI ENDOSCOPY,BIOPSY  4/16/15   • UPPER GI ENDOSCOPY,EXAM         Substance History:  Smoking Status: former   Hx of Substance Use/Abuse: former ETOH abuse, no illicits known    Cultural Information  Ethnicity: Born in [facility]    Allergies:  No Known Care:  Patient/Family: knows diagnosis  Patient's preference about sharing medical information: Patient and family may receive information  Patient's decision making preferences:  Involved and speak with León Almanzar or Yuliet Magana  Code status: DNR/DNI  Have damien

## 2018-12-06 NOTE — ANESTHESIA POSTPROCEDURE EVALUATION
Patient: Laila Martinez    Procedure Summary     Date:  12/06/18 Room / Location:  [address] [place] [address] ENDOSCOPY 01 / [address] [place] [address] ENDOSCOPY    Anesthesia Start:  1892 Anesthesia Stop:      Procedure:  ESOPHAGOGASTRODUODENOSCOPY (EGD) (N/A ) Diagnosis:       Dysphagia, unspecified ty

## 2018-12-10 NOTE — PROGRESS NOTES
Benito Tipton is a [de-identified]year old male. HPI:   1. Type 2 diabetes mellitus without complication (HCC)    The patient has been taking all prescribed diabetic medications at home and has been following a diabetic diet.  Recent HgA1c was 12.4 Patient advised to fo tablet Rfl: 0   Triamterene-HCTZ 37.5-25 MG Oral Cap TAKE 1 CAPSULE BY MOUTH EVERY DAY IN THE MORNING Disp: 90 capsule Rfl: 1   METFORMIN HCL 1000 MG Oral Tab TAKE 1 TABLET (1,000 MG TOTAL) BY MOUTH 2 (TWO) TIMES DAILY WITH MEALS.  Disp: 180 tablet Rfl: 0 GENERAL HEALTH: feels well otherwise  SKIN: denies any unusual skin lesions or rashes  RESPIRATORY: denies shortness of breath with exertion  CARDIOVASCULAR: denies chest pain on exertion  GI: denies abdominal pain and denies heartburn  NEURO: denies hea better than previously. The patient indicates understanding of these issues and agrees to the plan.     The patient is asked to return in 3 months

## 2018-12-11 NOTE — TELEPHONE ENCOUNTER
Patient's wife calling with an update. Nguyen Lopez saw Dr. Meet Jacome last Thursday and apparently he did not require any interventions. Apparently, all of the inflammation has resolved.  Nguyen Lopez is able to eat and drink well and Óscar Bile no longer needs to crush h

## 2019-01-01 ENCOUNTER — HOSPITAL ENCOUNTER (OUTPATIENT)
Dept: ULTRASOUND IMAGING | Facility: HOSPITAL | Age: 79
Discharge: HOME | End: 2019-01-01
Attending: INTERNAL MEDICINE
Payer: COMMERCIAL

## 2019-01-01 ENCOUNTER — TELEPHONE (OUTPATIENT)
Dept: INTERNAL MEDICINE CLINIC | Facility: CLINIC | Age: 79
End: 2019-01-01

## 2019-01-01 ENCOUNTER — OFFICE VISIT (OUTPATIENT)
Dept: INTERVENTIONAL RADIOLOGY/VASCULAR | Facility: HOSPITAL | Age: 79
End: 2019-01-01
Attending: RADIOLOGY
Payer: COMMERCIAL

## 2019-01-01 ENCOUNTER — HOSPITAL ENCOUNTER (OUTPATIENT)
Facility: HOSPITAL | Age: 79
Setting detail: HOSPITAL OUTPATIENT SURGERY
Discharge: HOME | End: 2019-01-01
Attending: INTERNAL MEDICINE | Admitting: INTERNAL MEDICINE
Payer: COMMERCIAL

## 2019-01-01 ENCOUNTER — OFFICE VISIT (OUTPATIENT)
Dept: OPTOMETRY | Facility: CLINIC | Age: 79
End: 2019-01-01
Payer: COMMERCIAL

## 2019-01-01 ENCOUNTER — OFFICE VISIT (OUTPATIENT)
Dept: NEUROLOGY | Facility: CLINIC | Age: 79
End: 2019-01-01
Payer: COMMERCIAL

## 2019-01-01 ENCOUNTER — TELEPHONE (OUTPATIENT)
Dept: HEMATOLOGY/ONCOLOGY | Facility: HOSPITAL | Age: 79
End: 2019-01-01

## 2019-01-01 ENCOUNTER — APPOINTMENT (OUTPATIENT)
Dept: LAB | Age: 79
End: 2019-01-01
Attending: INTERNAL MEDICINE
Payer: COMMERCIAL

## 2019-01-01 ENCOUNTER — OFFICE VISIT (OUTPATIENT)
Dept: INTERVENTIONAL RADIOLOGY/VASCULAR | Facility: HOSPITAL | Age: 79
End: 2019-01-01
Attending: INTERNAL MEDICINE
Payer: COMMERCIAL

## 2019-01-01 ENCOUNTER — APPOINTMENT (OUTPATIENT)
Dept: ULTRASOUND IMAGING | Facility: HOSPITAL | Age: 79
DRG: 433 | End: 2019-01-01
Attending: INTERNAL MEDICINE
Payer: COMMERCIAL

## 2019-01-01 ENCOUNTER — TELEPHONE (OUTPATIENT)
Dept: NEUROLOGY | Facility: CLINIC | Age: 79
End: 2019-01-01

## 2019-01-01 ENCOUNTER — TELEPHONE (OUTPATIENT)
Dept: GASTROENTEROLOGY | Facility: CLINIC | Age: 79
End: 2019-01-01

## 2019-01-01 ENCOUNTER — APPOINTMENT (OUTPATIENT)
Dept: HEMATOLOGY/ONCOLOGY | Facility: HOSPITAL | Age: 79
End: 2019-01-01
Attending: INTERNAL MEDICINE
Payer: COMMERCIAL

## 2019-01-01 ENCOUNTER — APPOINTMENT (OUTPATIENT)
Dept: ULTRASOUND IMAGING | Facility: HOSPITAL | Age: 79
DRG: 433 | End: 2019-01-01
Attending: CLINICAL NURSE SPECIALIST
Payer: COMMERCIAL

## 2019-01-01 ENCOUNTER — HOSPITAL ENCOUNTER (EMERGENCY)
Facility: HOSPITAL | Age: 79
Discharge: HOME | End: 2019-01-01
Attending: EMERGENCY MEDICINE
Payer: COMMERCIAL

## 2019-01-01 ENCOUNTER — LAB ENCOUNTER (OUTPATIENT)
Dept: LAB | Age: 79
End: 2019-01-01
Attending: INTERNAL MEDICINE
Payer: COMMERCIAL

## 2019-01-01 ENCOUNTER — OFFICE VISIT (OUTPATIENT)
Dept: INTERNAL MEDICINE CLINIC | Facility: CLINIC | Age: 79
End: 2019-01-01
Payer: COMMERCIAL

## 2019-01-01 ENCOUNTER — LAB ENCOUNTER (OUTPATIENT)
Dept: LAB | Facility: HOSPITAL | Age: 79
End: 2019-01-01
Attending: Other
Payer: COMMERCIAL

## 2019-01-01 ENCOUNTER — ANESTHESIA EVENT (OUTPATIENT)
Dept: MRI IMAGING | Facility: HOSPITAL | Age: 79
End: 2019-01-01

## 2019-01-01 ENCOUNTER — APPOINTMENT (OUTPATIENT)
Dept: LAB | Age: 79
End: 2019-01-01
Attending: ANESTHESIOLOGY
Payer: COMMERCIAL

## 2019-01-01 ENCOUNTER — MED REC SCAN ONLY (OUTPATIENT)
Dept: NEUROLOGY | Facility: CLINIC | Age: 79
End: 2019-01-01

## 2019-01-01 ENCOUNTER — HOSPITAL ENCOUNTER (OUTPATIENT)
Dept: ULTRASOUND IMAGING | Facility: HOSPITAL | Age: 79
Discharge: HOME | End: 2019-01-01
Attending: Other
Payer: COMMERCIAL

## 2019-01-01 ENCOUNTER — APPOINTMENT (OUTPATIENT)
Dept: LAB | Age: 79
End: 2019-01-01
Attending: PHYSICIAN ASSISTANT
Payer: COMMERCIAL

## 2019-01-01 ENCOUNTER — OFFICE VISIT (OUTPATIENT)
Dept: INTERNAL MEDICINE CLINIC | Facility: CLINIC | Age: 79
End: 2019-01-01
Payer: MEDICARE

## 2019-01-01 ENCOUNTER — TELEPHONE (OUTPATIENT)
Dept: OTHER | Age: 79
End: 2019-01-01

## 2019-01-01 ENCOUNTER — APPOINTMENT (OUTPATIENT)
Dept: INTERVENTIONAL RADIOLOGY/VASCULAR | Facility: HOSPITAL | Age: 79
DRG: 433 | End: 2019-01-01
Attending: HOSPITALIST
Payer: COMMERCIAL

## 2019-01-01 ENCOUNTER — OFFICE VISIT (OUTPATIENT)
Dept: HEMATOLOGY/ONCOLOGY | Facility: HOSPITAL | Age: 79
End: 2019-01-01
Attending: NURSE PRACTITIONER
Payer: COMMERCIAL

## 2019-01-01 ENCOUNTER — OFFICE VISIT (OUTPATIENT)
Dept: SURGERY | Facility: CLINIC | Age: 79
End: 2019-01-01
Payer: COMMERCIAL

## 2019-01-01 ENCOUNTER — HOSPITAL ENCOUNTER (INPATIENT)
Facility: HOSPITAL | Age: 79
LOS: 4 days | Discharge: HOSPICE | DRG: 433 | End: 2019-01-01
Attending: EMERGENCY MEDICINE | Admitting: HOSPITALIST
Payer: COMMERCIAL

## 2019-01-01 ENCOUNTER — PATIENT OUTREACH (OUTPATIENT)
Dept: CASE MANAGEMENT | Age: 79
End: 2019-01-01

## 2019-01-01 ENCOUNTER — HOSPITAL ENCOUNTER (INPATIENT)
Facility: HOSPITAL | Age: 79
LOS: 2 days | Discharge: HOME | DRG: 603 | End: 2019-01-01
Attending: EMERGENCY MEDICINE | Admitting: HOSPITALIST
Payer: COMMERCIAL

## 2019-01-01 ENCOUNTER — ANESTHESIA (OUTPATIENT)
Dept: MRI IMAGING | Facility: HOSPITAL | Age: 79
End: 2019-01-01

## 2019-01-01 ENCOUNTER — TELEPHONE (OUTPATIENT)
Dept: FAMILY MEDICINE CLINIC | Facility: CLINIC | Age: 79
End: 2019-01-01

## 2019-01-01 ENCOUNTER — APPOINTMENT (OUTPATIENT)
Dept: ULTRASOUND IMAGING | Facility: HOSPITAL | Age: 79
End: 2019-01-01
Attending: EMERGENCY MEDICINE
Payer: COMMERCIAL

## 2019-01-01 ENCOUNTER — APPOINTMENT (OUTPATIENT)
Dept: GENERAL RADIOLOGY | Facility: HOSPITAL | Age: 79
DRG: 433 | End: 2019-01-01
Attending: EMERGENCY MEDICINE
Payer: COMMERCIAL

## 2019-01-01 ENCOUNTER — HOSPITAL ENCOUNTER (OUTPATIENT)
Age: 79
Discharge: ANOTHER HOSPITAL | End: 2019-01-01
Attending: EMERGENCY MEDICINE
Payer: COMMERCIAL

## 2019-01-01 ENCOUNTER — HOSPITAL ENCOUNTER (OUTPATIENT)
Dept: MRI IMAGING | Facility: HOSPITAL | Age: 79
Discharge: HOME | End: 2019-01-01
Attending: Other
Payer: COMMERCIAL

## 2019-01-01 VITALS
DIASTOLIC BLOOD PRESSURE: 83 MMHG | HEART RATE: 120 BPM | SYSTOLIC BLOOD PRESSURE: 126 MMHG | RESPIRATION RATE: 16 BRPM | HEIGHT: 72 IN | BODY MASS INDEX: 32.91 KG/M2 | WEIGHT: 243 LBS

## 2019-01-01 VITALS
WEIGHT: 243 LBS | BODY MASS INDEX: 32.91 KG/M2 | HEART RATE: 112 BPM | SYSTOLIC BLOOD PRESSURE: 147 MMHG | HEIGHT: 72 IN | TEMPERATURE: 98 F | OXYGEN SATURATION: 98 % | RESPIRATION RATE: 20 BRPM | DIASTOLIC BLOOD PRESSURE: 47 MMHG

## 2019-01-01 VITALS
SYSTOLIC BLOOD PRESSURE: 108 MMHG | DIASTOLIC BLOOD PRESSURE: 52 MMHG | WEIGHT: 243 LBS | HEIGHT: 72 IN | BODY MASS INDEX: 32.91 KG/M2 | TEMPERATURE: 98 F | RESPIRATION RATE: 18 BRPM | OXYGEN SATURATION: 95 % | HEART RATE: 71 BPM

## 2019-01-01 VITALS
HEIGHT: 72 IN | OXYGEN SATURATION: 98 % | WEIGHT: 225.81 LBS | BODY MASS INDEX: 30.59 KG/M2 | RESPIRATION RATE: 18 BRPM | HEART RATE: 80 BPM | DIASTOLIC BLOOD PRESSURE: 62 MMHG | SYSTOLIC BLOOD PRESSURE: 104 MMHG | TEMPERATURE: 97 F

## 2019-01-01 VITALS
SYSTOLIC BLOOD PRESSURE: 106 MMHG | HEART RATE: 78 BPM | TEMPERATURE: 97 F | HEIGHT: 72 IN | RESPIRATION RATE: 18 BRPM | WEIGHT: 240 LBS | OXYGEN SATURATION: 98 % | BODY MASS INDEX: 32.51 KG/M2 | DIASTOLIC BLOOD PRESSURE: 63 MMHG

## 2019-01-01 VITALS
DIASTOLIC BLOOD PRESSURE: 50 MMHG | TEMPERATURE: 98 F | RESPIRATION RATE: 16 BRPM | HEART RATE: 73 BPM | OXYGEN SATURATION: 97 % | SYSTOLIC BLOOD PRESSURE: 109 MMHG

## 2019-01-01 VITALS
HEIGHT: 72 IN | RESPIRATION RATE: 16 BRPM | WEIGHT: 235 LBS | BODY MASS INDEX: 31.83 KG/M2 | SYSTOLIC BLOOD PRESSURE: 110 MMHG | DIASTOLIC BLOOD PRESSURE: 60 MMHG | HEART RATE: 68 BPM

## 2019-01-01 VITALS — WEIGHT: 258 LBS | BODY MASS INDEX: 35 KG/M2

## 2019-01-01 VITALS
WEIGHT: 258 LBS | BODY MASS INDEX: 34.95 KG/M2 | HEART RATE: 90 BPM | RESPIRATION RATE: 16 BRPM | DIASTOLIC BLOOD PRESSURE: 68 MMHG | HEIGHT: 72 IN | SYSTOLIC BLOOD PRESSURE: 157 MMHG

## 2019-01-01 VITALS
DIASTOLIC BLOOD PRESSURE: 72 MMHG | HEIGHT: 72 IN | RESPIRATION RATE: 16 BRPM | WEIGHT: 258 LBS | HEART RATE: 81 BPM | SYSTOLIC BLOOD PRESSURE: 125 MMHG | BODY MASS INDEX: 34.95 KG/M2

## 2019-01-01 VITALS
DIASTOLIC BLOOD PRESSURE: 49 MMHG | TEMPERATURE: 97 F | OXYGEN SATURATION: 97 % | SYSTOLIC BLOOD PRESSURE: 110 MMHG | HEART RATE: 62 BPM | RESPIRATION RATE: 20 BRPM

## 2019-01-01 VITALS
HEIGHT: 72 IN | RESPIRATION RATE: 20 BRPM | TEMPERATURE: 99 F | WEIGHT: 247 LBS | BODY MASS INDEX: 33.46 KG/M2 | DIASTOLIC BLOOD PRESSURE: 45 MMHG | OXYGEN SATURATION: 97 % | SYSTOLIC BLOOD PRESSURE: 126 MMHG | HEART RATE: 99 BPM

## 2019-01-01 VITALS
HEIGHT: 72 IN | SYSTOLIC BLOOD PRESSURE: 124 MMHG | HEART RATE: 83 BPM | RESPIRATION RATE: 16 BRPM | DIASTOLIC BLOOD PRESSURE: 50 MMHG | DIASTOLIC BLOOD PRESSURE: 65 MMHG | BODY MASS INDEX: 33.46 KG/M2 | RESPIRATION RATE: 16 BRPM | HEIGHT: 72 IN | WEIGHT: 252 LBS | OXYGEN SATURATION: 97 % | SYSTOLIC BLOOD PRESSURE: 141 MMHG | HEART RATE: 68 BPM | WEIGHT: 247 LBS | TEMPERATURE: 97 F | BODY MASS INDEX: 34.13 KG/M2

## 2019-01-01 VITALS
TEMPERATURE: 98 F | WEIGHT: 243 LBS | BODY MASS INDEX: 32.91 KG/M2 | HEIGHT: 72 IN | RESPIRATION RATE: 18 BRPM | SYSTOLIC BLOOD PRESSURE: 108 MMHG | HEART RATE: 71 BPM | DIASTOLIC BLOOD PRESSURE: 52 MMHG | OXYGEN SATURATION: 95 %

## 2019-01-01 VITALS
DIASTOLIC BLOOD PRESSURE: 51 MMHG | HEART RATE: 70 BPM | TEMPERATURE: 98 F | RESPIRATION RATE: 20 BRPM | SYSTOLIC BLOOD PRESSURE: 133 MMHG | OXYGEN SATURATION: 97 % | HEIGHT: 72 IN | WEIGHT: 249 LBS | BODY MASS INDEX: 33.72 KG/M2

## 2019-01-01 VITALS — HEART RATE: 76 BPM | RESPIRATION RATE: 15 BRPM | DIASTOLIC BLOOD PRESSURE: 66 MMHG | SYSTOLIC BLOOD PRESSURE: 132 MMHG

## 2019-01-01 VITALS
HEIGHT: 72 IN | RESPIRATION RATE: 16 BRPM | BODY MASS INDEX: 29.8 KG/M2 | SYSTOLIC BLOOD PRESSURE: 111 MMHG | HEART RATE: 64 BPM | WEIGHT: 220 LBS | DIASTOLIC BLOOD PRESSURE: 74 MMHG

## 2019-01-01 VITALS
SYSTOLIC BLOOD PRESSURE: 128 MMHG | WEIGHT: 247 LBS | DIASTOLIC BLOOD PRESSURE: 62 MMHG | HEIGHT: 72 IN | BODY MASS INDEX: 33.46 KG/M2

## 2019-01-01 VITALS
HEART RATE: 75 BPM | HEIGHT: 72 IN | DIASTOLIC BLOOD PRESSURE: 52 MMHG | RESPIRATION RATE: 16 BRPM | OXYGEN SATURATION: 98 % | BODY MASS INDEX: 32.64 KG/M2 | SYSTOLIC BLOOD PRESSURE: 105 MMHG | WEIGHT: 241 LBS

## 2019-01-01 VITALS
HEIGHT: 72 IN | WEIGHT: 247 LBS | SYSTOLIC BLOOD PRESSURE: 126 MMHG | HEART RATE: 99 BPM | TEMPERATURE: 98 F | BODY MASS INDEX: 33.46 KG/M2 | OXYGEN SATURATION: 97 % | RESPIRATION RATE: 20 BRPM | DIASTOLIC BLOOD PRESSURE: 45 MMHG

## 2019-01-01 VITALS
SYSTOLIC BLOOD PRESSURE: 118 MMHG | HEART RATE: 69 BPM | RESPIRATION RATE: 16 BRPM | DIASTOLIC BLOOD PRESSURE: 59 MMHG | BODY MASS INDEX: 34 KG/M2 | TEMPERATURE: 97 F | WEIGHT: 252 LBS | OXYGEN SATURATION: 97 %

## 2019-01-01 VITALS
RESPIRATION RATE: 15 BRPM | WEIGHT: 250 LBS | SYSTOLIC BLOOD PRESSURE: 154 MMHG | OXYGEN SATURATION: 99 % | DIASTOLIC BLOOD PRESSURE: 74 MMHG | HEART RATE: 61 BPM | BODY MASS INDEX: 33.86 KG/M2 | HEIGHT: 72 IN

## 2019-01-01 VITALS
DIASTOLIC BLOOD PRESSURE: 53 MMHG | SYSTOLIC BLOOD PRESSURE: 125 MMHG | WEIGHT: 210.13 LBS | TEMPERATURE: 98 F | BODY MASS INDEX: 28 KG/M2 | HEART RATE: 97 BPM | RESPIRATION RATE: 17 BRPM | OXYGEN SATURATION: 97 %

## 2019-01-01 VITALS
HEIGHT: 72 IN | DIASTOLIC BLOOD PRESSURE: 65 MMHG | HEART RATE: 73 BPM | WEIGHT: 239 LBS | BODY MASS INDEX: 32.37 KG/M2 | SYSTOLIC BLOOD PRESSURE: 116 MMHG | RESPIRATION RATE: 16 BRPM

## 2019-01-01 VITALS
DIASTOLIC BLOOD PRESSURE: 45 MMHG | HEART RATE: 72 BPM | TEMPERATURE: 98 F | OXYGEN SATURATION: 97 % | RESPIRATION RATE: 16 BRPM | SYSTOLIC BLOOD PRESSURE: 116 MMHG

## 2019-01-01 VITALS
RESPIRATION RATE: 18 BRPM | HEIGHT: 72 IN | BODY MASS INDEX: 32.1 KG/M2 | HEART RATE: 65 BPM | DIASTOLIC BLOOD PRESSURE: 52 MMHG | TEMPERATURE: 98 F | WEIGHT: 237 LBS | SYSTOLIC BLOOD PRESSURE: 129 MMHG

## 2019-01-01 VITALS
HEART RATE: 82 BPM | SYSTOLIC BLOOD PRESSURE: 150 MMHG | RESPIRATION RATE: 16 BRPM | HEIGHT: 72 IN | BODY MASS INDEX: 34.4 KG/M2 | WEIGHT: 254 LBS | DIASTOLIC BLOOD PRESSURE: 79 MMHG

## 2019-01-01 DIAGNOSIS — I63.9 CEREBROVASCULAR ACCIDENT (CVA), UNSPECIFIED MECHANISM (HCC): Primary | ICD-10-CM

## 2019-01-01 DIAGNOSIS — G30.1 LATE ONSET ALZHEIMER'S DISEASE WITH BEHAVIORAL DISTURBANCE (HCC): ICD-10-CM

## 2019-01-01 DIAGNOSIS — F02.81 LATE ONSET ALZHEIMER'S DISEASE WITH BEHAVIORAL DISTURBANCE (HCC): ICD-10-CM

## 2019-01-01 DIAGNOSIS — Z71.89 GOALS OF CARE, COUNSELING/DISCUSSION: ICD-10-CM

## 2019-01-01 DIAGNOSIS — D64.9 ANEMIA: Primary | ICD-10-CM

## 2019-01-01 DIAGNOSIS — Z87.19 H/O ESOPHAGEAL VARICES: ICD-10-CM

## 2019-01-01 DIAGNOSIS — I10 ESSENTIAL HYPERTENSION WITH GOAL BLOOD PRESSURE LESS THAN 130/85: ICD-10-CM

## 2019-01-01 DIAGNOSIS — F10.27 DEMENTIA ASSOCIATED WITH ALCOHOLISM WITH BEHAVIORAL DISTURBANCE (HCC): ICD-10-CM

## 2019-01-01 DIAGNOSIS — K43.9 HERNIA OF ABDOMINAL WALL: Primary | ICD-10-CM

## 2019-01-01 DIAGNOSIS — R13.10 DYSPHAGIA, UNSPECIFIED TYPE: ICD-10-CM

## 2019-01-01 DIAGNOSIS — Z71.89 ADVANCE CARE PLANNING: ICD-10-CM

## 2019-01-01 DIAGNOSIS — K44.9 HIATAL HERNIA: Primary | ICD-10-CM

## 2019-01-01 DIAGNOSIS — I50.22 CHRONIC SYSTOLIC CONGESTIVE HEART FAILURE (HCC): ICD-10-CM

## 2019-01-01 DIAGNOSIS — Z87.891 HISTORY OF TOBACCO USE: ICD-10-CM

## 2019-01-01 DIAGNOSIS — R18.8 OTHER ASCITES: ICD-10-CM

## 2019-01-01 DIAGNOSIS — Z48.89 ENCOUNTER FOR POST SURGICAL WOUND CHECK: Primary | ICD-10-CM

## 2019-01-01 DIAGNOSIS — D69.6 THROMBOCYTOPENIA (HCC): ICD-10-CM

## 2019-01-01 DIAGNOSIS — Z87.898 HISTORY OF ALCOHOL USE: ICD-10-CM

## 2019-01-01 DIAGNOSIS — K74.60 CIRRHOSIS OF LIVER WITH ASCITES, UNSPECIFIED HEPATIC CIRRHOSIS TYPE (HCC): ICD-10-CM

## 2019-01-01 DIAGNOSIS — C15.4 MALIGNANT NEOPLASM OF MIDDLE THIRD OF ESOPHAGUS (HCC): Primary | ICD-10-CM

## 2019-01-01 DIAGNOSIS — R18.8 OTHER ASCITES: Primary | ICD-10-CM

## 2019-01-01 DIAGNOSIS — K92.1 BLOODY STOOL: ICD-10-CM

## 2019-01-01 DIAGNOSIS — E11.9 TYPE 2 DIABETES MELLITUS WITHOUT COMPLICATION, WITHOUT LONG-TERM CURRENT USE OF INSULIN (HCC): Primary | ICD-10-CM

## 2019-01-01 DIAGNOSIS — R63.5 WEIGHT GAIN: ICD-10-CM

## 2019-01-01 DIAGNOSIS — Z71.89 GOALS OF CARE, COUNSELING/DISCUSSION: Primary | ICD-10-CM

## 2019-01-01 DIAGNOSIS — C15.9 SQUAMOUS CELL CARCINOMA OF ESOPHAGUS (HCC): ICD-10-CM

## 2019-01-01 DIAGNOSIS — C15.4 MALIGNANT NEOPLASM OF MIDDLE THIRD OF ESOPHAGUS (HCC): ICD-10-CM

## 2019-01-01 DIAGNOSIS — R18.8 CIRRHOSIS OF LIVER WITH ASCITES, UNSPECIFIED HEPATIC CIRRHOSIS TYPE (HCC): ICD-10-CM

## 2019-01-01 DIAGNOSIS — I50.20 SYSTOLIC CONGESTIVE HEART FAILURE, UNSPECIFIED HF CHRONICITY (HCC): Primary | ICD-10-CM

## 2019-01-01 DIAGNOSIS — L03.90 CELLULITIS, UNSPECIFIED CELLULITIS SITE: Primary | ICD-10-CM

## 2019-01-01 DIAGNOSIS — I50.20 SYSTOLIC CONGESTIVE HEART FAILURE, UNSPECIFIED HF CHRONICITY (HCC): ICD-10-CM

## 2019-01-01 DIAGNOSIS — R41.3 MEMORY LOSS: ICD-10-CM

## 2019-01-01 DIAGNOSIS — K42.9 UMBILICAL HERNIA WITHOUT OBSTRUCTION AND WITHOUT GANGRENE: Primary | ICD-10-CM

## 2019-01-01 DIAGNOSIS — K70.31 ASCITES DUE TO ALCOHOLIC CIRRHOSIS (HCC): ICD-10-CM

## 2019-01-01 DIAGNOSIS — I63.9 CEREBROVASCULAR ACCIDENT (CVA), UNSPECIFIED MECHANISM (HCC): ICD-10-CM

## 2019-01-01 DIAGNOSIS — D64.9 ANEMIA: ICD-10-CM

## 2019-01-01 DIAGNOSIS — D64.9 ANEMIA, UNSPECIFIED TYPE: Primary | ICD-10-CM

## 2019-01-01 DIAGNOSIS — C15.9 SQUAMOUS CELL ESOPHAGEAL CANCER (HCC): ICD-10-CM

## 2019-01-01 DIAGNOSIS — Z02.9 ENCOUNTERS FOR ADMINISTRATIVE PURPOSE: ICD-10-CM

## 2019-01-01 DIAGNOSIS — E11.9 TYPE 2 DIABETES MELLITUS WITHOUT COMPLICATION, WITHOUT LONG-TERM CURRENT USE OF INSULIN (HCC): ICD-10-CM

## 2019-01-01 DIAGNOSIS — K42.9 UMBILICAL HERNIA WITHOUT OBSTRUCTION AND WITHOUT GANGRENE: ICD-10-CM

## 2019-01-01 DIAGNOSIS — R16.2 HEPATOSPLENOMEGALY: ICD-10-CM

## 2019-01-01 DIAGNOSIS — Z51.81 THERAPEUTIC DRUG MONITORING: Primary | ICD-10-CM

## 2019-01-01 DIAGNOSIS — H35.373 MACULAR PUCKER, BILATERAL: ICD-10-CM

## 2019-01-01 DIAGNOSIS — E11.319 DIABETIC RETINOPATHY ASSOCIATED WITH CONTROLLED TYPE 2 DIABETES MELLITUS (HCC): Primary | ICD-10-CM

## 2019-01-01 DIAGNOSIS — L03.311 ABDOMINAL WALL CELLULITIS: Primary | ICD-10-CM

## 2019-01-01 DIAGNOSIS — K62.5 RECTAL BLEEDING: ICD-10-CM

## 2019-01-01 DIAGNOSIS — D64.9 ANEMIA, UNSPECIFIED TYPE: ICD-10-CM

## 2019-01-01 DIAGNOSIS — L03.90 CELLULITIS, UNSPECIFIED CELLULITIS SITE: ICD-10-CM

## 2019-01-01 DIAGNOSIS — Z23 NEED FOR VACCINATION: ICD-10-CM

## 2019-01-01 DIAGNOSIS — Z71.89 ADVANCE CARE PLANNING: Primary | ICD-10-CM

## 2019-01-01 DIAGNOSIS — Z51.81 THERAPEUTIC DRUG MONITORING: ICD-10-CM

## 2019-01-01 DIAGNOSIS — R41.3 MEMORY LOSS: Primary | ICD-10-CM

## 2019-01-01 LAB
ANTIBODY SCREEN: NEGATIVE
ANTIBODY SCREEN: NEGATIVE
BASOPHILS # BLD AUTO: 0.02 X10(3) UL (ref 0–0.2)
BASOPHILS # BLD AUTO: 0.03 X10(3) UL (ref 0–0.2)
BASOPHILS NFR BLD AUTO: 0.6 %
BASOPHILS NFR BLD AUTO: 1 %
BLOOD TYPE BARCODE: 5100
BLOOD TYPE BARCODE: 9500
CANCER AG19-9 SERPL-ACNC: 20.7 U/ML (ref ?–37)
CEA SERPL-MCNC: 10.7 NG/ML (ref ?–5)
CHOLEST SMN-MCNC: 128 MG/DL (ref ?–200)
DEPRECATED HBV CORE AB SER IA-ACNC: 10.2 NG/ML (ref 30–530)
DEPRECATED RDW RBC AUTO: 48.7 FL (ref 35.1–46.3)
DEPRECATED RDW RBC AUTO: 49.7 FL (ref 35.1–46.3)
EOSINOPHIL # BLD AUTO: 0.13 X10(3) UL (ref 0–0.7)
EOSINOPHIL # BLD AUTO: 0.16 X10(3) UL (ref 0–0.7)
EOSINOPHIL NFR BLD AUTO: 3.7 %
EOSINOPHIL NFR BLD AUTO: 5.2 %
ERYTHROCYTE [DISTWIDTH] IN BLOOD BY AUTOMATED COUNT: 14.6 % (ref 11–15)
ERYTHROCYTE [DISTWIDTH] IN BLOOD BY AUTOMATED COUNT: 15.8 % (ref 11–15)
FOLATE SERPL-MCNC: >20 NG/ML (ref 8.7–?)
GLUCOSE BLDC GLUCOMTR-MCNC: 81 MG/DL (ref 70–99)
HCT VFR BLD AUTO: 22.1 % (ref 39–53)
HCT VFR BLD AUTO: 22.3 % (ref 39–53)
HCT VFR BLD AUTO: 28.3 % (ref 39–53)
HDLC SERPL-MCNC: 26 MG/DL (ref 40–59)
HGB BLD-MCNC: 6.4 G/DL (ref 13–17.5)
HGB BLD-MCNC: 6.7 G/DL (ref 13–17.5)
HGB BLD-MCNC: 8.4 G/DL (ref 13–17.5)
IMM GRANULOCYTES # BLD AUTO: 0 X10(3) UL (ref 0–1)
IMM GRANULOCYTES # BLD AUTO: 0.01 X10(3) UL (ref 0–1)
IMM GRANULOCYTES NFR BLD: 0 %
IMM GRANULOCYTES NFR BLD: 0.3 %
IRON SATURATION: 3 % (ref 20–50)
IRON SERPL-MCNC: 20 UG/DL (ref 65–175)
LDLC SERPL CALC-MCNC: 81 MG/DL (ref ?–100)
LYMPHOCYTES # BLD AUTO: 0.43 X10(3) UL (ref 1–4)
LYMPHOCYTES # BLD AUTO: 0.45 X10(3) UL (ref 1–4)
LYMPHOCYTES NFR BLD AUTO: 12.9 %
LYMPHOCYTES NFR BLD AUTO: 14.1 %
MCH RBC QN AUTO: 25.3 PG (ref 26–34)
MCH RBC QN AUTO: 28 PG (ref 26–34)
MCHC RBC AUTO-ENTMCNC: 29.7 G/DL (ref 31–37)
MCHC RBC AUTO-ENTMCNC: 30.3 G/DL (ref 31–37)
MCV RBC AUTO: 85.2 FL (ref 80–100)
MCV RBC AUTO: 92.5 FL (ref 80–100)
MONOCYTES # BLD AUTO: 0.53 X10(3) UL (ref 0.1–1)
MONOCYTES # BLD AUTO: 0.64 X10(3) UL (ref 0.1–1)
MONOCYTES NFR BLD AUTO: 17.3 %
MONOCYTES NFR BLD AUTO: 18.4 %
NEUTROPHILS # BLD AUTO: 1.9 X10 (3) UL (ref 1.5–7.7)
NEUTROPHILS # BLD AUTO: 1.9 X10(3) UL (ref 1.5–7.7)
NEUTROPHILS # BLD AUTO: 2.24 X10 (3) UL (ref 1.5–7.7)
NEUTROPHILS # BLD AUTO: 2.24 X10(3) UL (ref 1.5–7.7)
NEUTROPHILS NFR BLD AUTO: 62.1 %
NEUTROPHILS NFR BLD AUTO: 64.4 %
NONHDLC SERPL-MCNC: 102 MG/DL (ref ?–130)
PLATELET # BLD AUTO: 83 10(3)UL (ref 150–450)
PLATELET # BLD AUTO: 95 10(3)UL (ref 150–450)
RBC # BLD AUTO: 2.39 X10(6)UL (ref 3.8–5.8)
RBC # BLD AUTO: 3.32 X10(6)UL (ref 3.8–5.8)
RH BLOOD TYPE: POSITIVE
RH BLOOD TYPE: POSITIVE
T PALLIDUM AB SER QL: NEGATIVE
T4 FREE SERPL-MCNC: 1.1 NG/DL (ref 0.8–1.7)
TOTAL IRON BINDING CAPACITY: 580 UG/DL (ref 240–450)
TRANSFERRIN SERPL-MCNC: 389 MG/DL (ref 200–360)
TRIGL SERPL-MCNC: 103 MG/DL (ref 30–149)
TSI SER-ACNC: 5.38 MIU/ML (ref 0.36–3.74)
VIT B12 SERPL-MCNC: 924 PG/ML (ref 193–986)
VLDLC SERPL CALC-MCNC: 21 MG/DL (ref 0–30)
WBC # BLD AUTO: 3.1 X10(3) UL (ref 4–11)
WBC # BLD AUTO: 3.5 X10(3) UL (ref 4–11)

## 2019-01-01 PROCEDURE — P9047 ALBUMIN (HUMAN), 25%, 50ML: HCPCS

## 2019-01-01 PROCEDURE — 86901 BLOOD TYPING SEROLOGIC RH(D): CPT | Performed by: ANESTHESIOLOGY

## 2019-01-01 PROCEDURE — 99204 OFFICE O/P NEW MOD 45 MIN: CPT | Performed by: OTHER

## 2019-01-01 PROCEDURE — 99214 OFFICE O/P EST MOD 30 MIN: CPT | Performed by: NURSE PRACTITIONER

## 2019-01-01 PROCEDURE — 36415 COLL VENOUS BLD VENIPUNCTURE: CPT

## 2019-01-01 PROCEDURE — 99213 OFFICE O/P EST LOW 20 MIN: CPT | Performed by: NURSE PRACTITIONER

## 2019-01-01 PROCEDURE — 80048 BASIC METABOLIC PNL TOTAL CA: CPT

## 2019-01-01 PROCEDURE — 84466 ASSAY OF TRANSFERRIN: CPT

## 2019-01-01 PROCEDURE — 99213 OFFICE O/P EST LOW 20 MIN: CPT | Performed by: OTHER

## 2019-01-01 PROCEDURE — 86901 BLOOD TYPING SEROLOGIC RH(D): CPT

## 2019-01-01 PROCEDURE — 93880 EXTRACRANIAL BILAT STUDY: CPT | Performed by: OTHER

## 2019-01-01 PROCEDURE — 49083 ABD PARACENTESIS W/IMAGING: CPT | Performed by: INTERNAL MEDICINE

## 2019-01-01 PROCEDURE — 83036 HEMOGLOBIN GLYCOSYLATED A1C: CPT

## 2019-01-01 PROCEDURE — 87205 SMEAR GRAM STAIN: CPT

## 2019-01-01 PROCEDURE — 84443 ASSAY THYROID STIM HORMONE: CPT

## 2019-01-01 PROCEDURE — 87389 HIV-1 AG W/HIV-1&-2 AB AG IA: CPT

## 2019-01-01 PROCEDURE — 99233 SBSQ HOSP IP/OBS HIGH 50: CPT | Performed by: HOSPITALIST

## 2019-01-01 PROCEDURE — 99213 OFFICE O/P EST LOW 20 MIN: CPT

## 2019-01-01 PROCEDURE — 85025 COMPLETE CBC W/AUTO DIFF WBC: CPT

## 2019-01-01 PROCEDURE — 88112 CYTOPATH CELL ENHANCE TECH: CPT

## 2019-01-01 PROCEDURE — 86850 RBC ANTIBODY SCREEN: CPT

## 2019-01-01 PROCEDURE — 88342 IMHCHEM/IMCYTCHM 1ST ANTB: CPT

## 2019-01-01 PROCEDURE — 85025 COMPLETE CBC W/AUTO DIFF WBC: CPT | Performed by: EMERGENCY MEDICINE

## 2019-01-01 PROCEDURE — 70551 MRI BRAIN STEM W/O DYE: CPT | Performed by: OTHER

## 2019-01-01 PROCEDURE — 82728 ASSAY OF FERRITIN: CPT

## 2019-01-01 PROCEDURE — 86900 BLOOD TYPING SEROLOGIC ABO: CPT

## 2019-01-01 PROCEDURE — 99239 HOSP IP/OBS DSCHRG MGMT >30: CPT | Performed by: HOSPITALIST

## 2019-01-01 PROCEDURE — 86900 BLOOD TYPING SEROLOGIC ABO: CPT | Performed by: ANESTHESIOLOGY

## 2019-01-01 PROCEDURE — 0W9G3ZZ DRAINAGE OF PERITONEAL CAVITY, PERCUTANEOUS APPROACH: ICD-10-PCS | Performed by: RADIOLOGY

## 2019-01-01 PROCEDURE — 87070 CULTURE OTHR SPECIMN AEROBIC: CPT

## 2019-01-01 PROCEDURE — 99212 OFFICE O/P EST SF 10 MIN: CPT | Performed by: INTERNAL MEDICINE

## 2019-01-01 PROCEDURE — 76700 US EXAM ABDOM COMPLETE: CPT | Performed by: INTERNAL MEDICINE

## 2019-01-01 PROCEDURE — 84439 ASSAY OF FREE THYROXINE: CPT

## 2019-01-01 PROCEDURE — 71045 X-RAY EXAM CHEST 1 VIEW: CPT | Performed by: EMERGENCY MEDICINE

## 2019-01-01 PROCEDURE — 99282 EMERGENCY DEPT VISIT SF MDM: CPT

## 2019-01-01 PROCEDURE — 89051 BODY FLUID CELL COUNT: CPT

## 2019-01-01 PROCEDURE — 82105 ALPHA-FETOPROTEIN SERUM: CPT

## 2019-01-01 PROCEDURE — 99495 TRANSJ CARE MGMT MOD F2F 14D: CPT | Performed by: INTERNAL MEDICINE

## 2019-01-01 PROCEDURE — 86780 TREPONEMA PALLIDUM: CPT

## 2019-01-01 PROCEDURE — 85018 HEMOGLOBIN: CPT | Performed by: ANESTHESIOLOGY

## 2019-01-01 PROCEDURE — 85025 COMPLETE CBC W/AUTO DIFF WBC: CPT | Performed by: INTERNAL MEDICINE

## 2019-01-01 PROCEDURE — 85060 BLOOD SMEAR INTERPRETATION: CPT | Performed by: EMERGENCY MEDICINE

## 2019-01-01 PROCEDURE — 99212 OFFICE O/P EST SF 10 MIN: CPT | Performed by: SURGERY

## 2019-01-01 PROCEDURE — 49083 ABD PARACENTESIS W/IMAGING: CPT | Performed by: EMERGENCY MEDICINE

## 2019-01-01 PROCEDURE — 93975 VASCULAR STUDY: CPT | Performed by: INTERNAL MEDICINE

## 2019-01-01 PROCEDURE — 99215 OFFICE O/P EST HI 40 MIN: CPT | Performed by: INTERNAL MEDICINE

## 2019-01-01 PROCEDURE — 85014 HEMATOCRIT: CPT | Performed by: ANESTHESIOLOGY

## 2019-01-01 PROCEDURE — 36430 TRANSFUSION BLD/BLD COMPNT: CPT

## 2019-01-01 PROCEDURE — 86850 RBC ANTIBODY SCREEN: CPT | Performed by: EMERGENCY MEDICINE

## 2019-01-01 PROCEDURE — 85610 PROTHROMBIN TIME: CPT | Performed by: CLINICAL NURSE SPECIALIST

## 2019-01-01 PROCEDURE — 99214 OFFICE O/P EST MOD 30 MIN: CPT | Performed by: INTERNAL MEDICINE

## 2019-01-01 PROCEDURE — 99204 OFFICE O/P NEW MOD 45 MIN: CPT | Performed by: SURGERY

## 2019-01-01 PROCEDURE — 80048 BASIC METABOLIC PNL TOTAL CA: CPT | Performed by: EMERGENCY MEDICINE

## 2019-01-01 PROCEDURE — 88341 IMHCHEM/IMCYTCHM EA ADD ANTB: CPT

## 2019-01-01 PROCEDURE — 49083 ABD PARACENTESIS W/IMAGING: CPT | Performed by: CLINICAL NURSE SPECIALIST

## 2019-01-01 PROCEDURE — 1111F DSCHRG MED/CURRENT MED MERGE: CPT | Performed by: INTERNAL MEDICINE

## 2019-01-01 PROCEDURE — 85610 PROTHROMBIN TIME: CPT | Performed by: EMERGENCY MEDICINE

## 2019-01-01 PROCEDURE — 83880 ASSAY OF NATRIURETIC PEPTIDE: CPT | Performed by: INTERNAL MEDICINE

## 2019-01-01 PROCEDURE — 36415 COLL VENOUS BLD VENIPUNCTURE: CPT | Performed by: CLINICAL NURSE SPECIALIST

## 2019-01-01 PROCEDURE — 82272 OCCULT BLD FECES 1-3 TESTS: CPT

## 2019-01-01 PROCEDURE — 92014 COMPRE OPH EXAM EST PT 1/>: CPT | Performed by: OPTOMETRIST

## 2019-01-01 PROCEDURE — 99222 1ST HOSP IP/OBS MODERATE 55: CPT | Performed by: INTERNAL MEDICINE

## 2019-01-01 PROCEDURE — 86850 RBC ANTIBODY SCREEN: CPT | Performed by: ANESTHESIOLOGY

## 2019-01-01 PROCEDURE — 0W9G30Z DRAINAGE OF PERITONEAL CAVITY WITH DRAINAGE DEVICE, PERCUTANEOUS APPROACH: ICD-10-PCS | Performed by: RADIOLOGY

## 2019-01-01 PROCEDURE — 80076 HEPATIC FUNCTION PANEL: CPT | Performed by: EMERGENCY MEDICINE

## 2019-01-01 PROCEDURE — 83540 ASSAY OF IRON: CPT

## 2019-01-01 PROCEDURE — 99232 SBSQ HOSP IP/OBS MODERATE 35: CPT | Performed by: INTERNAL MEDICINE

## 2019-01-01 PROCEDURE — 85610 PROTHROMBIN TIME: CPT

## 2019-01-01 PROCEDURE — 99214 OFFICE O/P EST MOD 30 MIN: CPT | Performed by: OTHER

## 2019-01-01 PROCEDURE — 80053 COMPREHEN METABOLIC PANEL: CPT

## 2019-01-01 PROCEDURE — 89050 BODY FLUID CELL COUNT: CPT

## 2019-01-01 PROCEDURE — 99223 1ST HOSP IP/OBS HIGH 75: CPT | Performed by: HOSPITALIST

## 2019-01-01 PROCEDURE — 82607 VITAMIN B-12: CPT

## 2019-01-01 PROCEDURE — 1111F DSCHRG MED/CURRENT MED MERGE: CPT

## 2019-01-01 PROCEDURE — 86900 BLOOD TYPING SEROLOGIC ABO: CPT | Performed by: EMERGENCY MEDICINE

## 2019-01-01 PROCEDURE — 82746 ASSAY OF FOLIC ACID SERUM: CPT

## 2019-01-01 PROCEDURE — 82962 GLUCOSE BLOOD TEST: CPT

## 2019-01-01 PROCEDURE — 88305 TISSUE EXAM BY PATHOLOGIST: CPT

## 2019-01-01 PROCEDURE — 80061 LIPID PANEL: CPT

## 2019-01-01 PROCEDURE — 99285 EMERGENCY DEPT VISIT HI MDM: CPT

## 2019-01-01 PROCEDURE — 86920 COMPATIBILITY TEST SPIN: CPT

## 2019-01-01 PROCEDURE — 99222 1ST HOSP IP/OBS MODERATE 55: CPT | Performed by: HOSPITALIST

## 2019-01-01 PROCEDURE — G0463 HOSPITAL OUTPT CLINIC VISIT: HCPCS | Performed by: INTERNAL MEDICINE

## 2019-01-01 PROCEDURE — 99215 OFFICE O/P EST HI 40 MIN: CPT | Performed by: NURSE PRACTITIONER

## 2019-01-01 PROCEDURE — 83690 ASSAY OF LIPASE: CPT | Performed by: EMERGENCY MEDICINE

## 2019-01-01 PROCEDURE — 99232 SBSQ HOSP IP/OBS MODERATE 35: CPT | Performed by: HOSPITALIST

## 2019-01-01 PROCEDURE — 86901 BLOOD TYPING SEROLOGIC RH(D): CPT | Performed by: EMERGENCY MEDICINE

## 2019-01-01 PROCEDURE — 90471 IMMUNIZATION ADMIN: CPT | Performed by: INTERNAL MEDICINE

## 2019-01-01 PROCEDURE — 85730 THROMBOPLASTIN TIME PARTIAL: CPT | Performed by: EMERGENCY MEDICINE

## 2019-01-01 PROCEDURE — 90662 IIV NO PRSV INCREASED AG IM: CPT | Performed by: INTERNAL MEDICINE

## 2019-01-01 PROCEDURE — 80076 HEPATIC FUNCTION PANEL: CPT | Performed by: INTERNAL MEDICINE

## 2019-01-01 RX ORDER — SERTRALINE HYDROCHLORIDE 25 MG/1
25 TABLET, FILM COATED ORAL NIGHTLY
Status: DISCONTINUED | OUTPATIENT
Start: 2019-01-01 | End: 2019-01-01

## 2019-01-01 RX ORDER — FERROUS SULFATE TAB EC 324 MG (65 MG FE EQUIVALENT) 324 (65 FE) MG
1 TABLET DELAYED RESPONSE ORAL 2 TIMES DAILY
Qty: 30 TABLET | Refills: 3 | Status: SHIPPED | OUTPATIENT
Start: 2019-01-01 | End: 2019-01-01

## 2019-01-01 RX ORDER — ENOXAPARIN SODIUM 100 MG/ML
40 INJECTION SUBCUTANEOUS DAILY
Status: DISCONTINUED | OUTPATIENT
Start: 2019-01-01 | End: 2019-01-01

## 2019-01-01 RX ORDER — SODIUM CHLORIDE 9 MG/ML
INJECTION, SOLUTION INTRAVENOUS
Status: DISCONTINUED
Start: 2019-01-01 | End: 2019-01-01

## 2019-01-01 RX ORDER — BLOOD SUGAR DIAGNOSTIC
STRIP MISCELLANEOUS
Qty: 100 STRIP | Refills: 3 | Status: SHIPPED | OUTPATIENT
Start: 2019-01-01

## 2019-01-01 RX ORDER — MELATONIN
325 2 TIMES DAILY
Status: DISCONTINUED | OUTPATIENT
Start: 2019-01-01 | End: 2019-01-01

## 2019-01-01 RX ORDER — ALBUMIN (HUMAN) 12.5 G/50ML
100 SOLUTION INTRAVENOUS AS NEEDED
Status: DISCONTINUED | OUTPATIENT
Start: 2019-01-01 | End: 2019-01-01

## 2019-01-01 RX ORDER — SPIRONOLACTONE 25 MG/1
25 TABLET ORAL
Qty: 90 TABLET | Refills: 1 | Status: ON HOLD | OUTPATIENT
Start: 2019-01-01 | End: 2019-01-01

## 2019-01-01 RX ORDER — SODIUM CHLORIDE 0.9 % (FLUSH) 0.9 %
10 SYRINGE (ML) INJECTION AS NEEDED
Status: DISCONTINUED | OUTPATIENT
Start: 2019-01-01 | End: 2019-01-01

## 2019-01-01 RX ORDER — SERTRALINE HYDROCHLORIDE 25 MG/1
25 TABLET, FILM COATED ORAL NIGHTLY
Qty: 90 TABLET | Refills: 1 | Status: SHIPPED | OUTPATIENT
Start: 2019-01-01 | End: 2019-01-01

## 2019-01-01 RX ORDER — LANCETS
EACH MISCELLANEOUS
Qty: 100 EACH | Refills: 3 | Status: SHIPPED | OUTPATIENT
Start: 2019-01-01

## 2019-01-01 RX ORDER — ATORVASTATIN CALCIUM 10 MG/1
10 TABLET, FILM COATED ORAL NIGHTLY
Status: DISCONTINUED | OUTPATIENT
Start: 2019-01-01 | End: 2019-01-01

## 2019-01-01 RX ORDER — SPIRONOLACTONE 25 MG/1
TABLET ORAL
Qty: 30 TABLET | Refills: 0 | Status: SHIPPED | OUTPATIENT
Start: 2019-01-01 | End: 2019-01-01

## 2019-01-01 RX ORDER — DONEPEZIL HYDROCHLORIDE 10 MG/1
10 TABLET, FILM COATED ORAL NIGHTLY
Status: DISCONTINUED | OUTPATIENT
Start: 2019-01-01 | End: 2019-01-01

## 2019-01-01 RX ORDER — ACETAMINOPHEN 325 MG/1
TABLET ORAL
Status: COMPLETED
Start: 2019-01-01 | End: 2019-01-01

## 2019-01-01 RX ORDER — GLIMEPIRIDE 4 MG/1
1 TABLET ORAL 2 TIMES DAILY
Refills: 2 | COMMUNITY
Start: 2019-01-01 | End: 2019-01-01 | Stop reason: ALTCHOICE

## 2019-01-01 RX ORDER — ALBUMIN (HUMAN) 12.5 G/50ML
SOLUTION INTRAVENOUS
Status: DISCONTINUED
Start: 2019-01-01 | End: 2019-01-01

## 2019-01-01 RX ORDER — GLIMEPIRIDE 4 MG/1
TABLET ORAL
Qty: 180 TABLET | Refills: 2 | Status: SHIPPED | OUTPATIENT
Start: 2019-01-01 | End: 2019-01-01 | Stop reason: ALTCHOICE

## 2019-01-01 RX ORDER — SIMVASTATIN 10 MG
10 TABLET ORAL NIGHTLY
Qty: 90 TABLET | Refills: 3 | Status: SHIPPED | OUTPATIENT
Start: 2019-01-01 | End: 2019-01-01

## 2019-01-01 RX ORDER — MIDAZOLAM HYDROCHLORIDE 1 MG/ML
INJECTION INTRAMUSCULAR; INTRAVENOUS
Status: COMPLETED
Start: 2019-01-01 | End: 2019-01-01

## 2019-01-01 RX ORDER — FUROSEMIDE 40 MG/1
40 TABLET ORAL DAILY
Qty: 90 TABLET | Refills: 3 | Status: ON HOLD | OUTPATIENT
Start: 2019-01-01 | End: 2019-01-01

## 2019-01-01 RX ORDER — LANCETS
EACH MISCELLANEOUS
Qty: 100 EACH | Refills: 3 | Status: SHIPPED | OUTPATIENT
Start: 2019-01-01 | End: 2019-01-01

## 2019-01-01 RX ORDER — ACETAMINOPHEN 325 MG/1
650 TABLET ORAL EVERY 4 HOURS PRN
Status: DISCONTINUED | OUTPATIENT
Start: 2019-01-01 | End: 2019-01-01

## 2019-01-01 RX ORDER — NATEGLINIDE 120 MG/1
120 TABLET, COATED ORAL 2 TIMES DAILY
Qty: 180 TABLET | Refills: 3 | Status: CANCELLED | OUTPATIENT
Start: 2019-01-01

## 2019-01-01 RX ORDER — DIPHENHYDRAMINE HCL 25 MG
25 CAPSULE ORAL ONCE
Status: CANCELLED | OUTPATIENT
Start: 2019-01-01

## 2019-01-01 RX ORDER — SERTRALINE HYDROCHLORIDE 25 MG/1
25 TABLET, FILM COATED ORAL NIGHTLY
Qty: 90 TABLET | Refills: 3 | Status: SHIPPED | OUTPATIENT
Start: 2019-01-01 | End: 2019-01-01

## 2019-01-01 RX ORDER — SPIRONOLACTONE 100 MG/1
100 TABLET, FILM COATED ORAL DAILY
Qty: 30 TABLET | Refills: 0 | Status: SHIPPED | OUTPATIENT
Start: 2019-01-01

## 2019-01-01 RX ORDER — SPIRONOLACTONE 25 MG/1
25 TABLET ORAL DAILY
Qty: 30 TABLET | Refills: 0 | Status: SHIPPED | OUTPATIENT
Start: 2019-01-01 | End: 2019-01-01

## 2019-01-01 RX ORDER — SPIRONOLACTONE 50 MG/1
100 TABLET, FILM COATED ORAL DAILY
Status: DISCONTINUED | OUTPATIENT
Start: 2019-01-01 | End: 2019-01-01

## 2019-01-01 RX ORDER — DONEPEZIL HYDROCHLORIDE 10 MG/1
10 TABLET, FILM COATED ORAL NIGHTLY
Qty: 90 TABLET | Refills: 3 | Status: SHIPPED | OUTPATIENT
Start: 2019-01-01 | End: 2019-01-01

## 2019-01-01 RX ORDER — SERTRALINE HYDROCHLORIDE 25 MG/1
25 TABLET, FILM COATED ORAL NIGHTLY
Qty: 90 TABLET | Refills: 3 | Status: SHIPPED | OUTPATIENT
Start: 2019-01-01

## 2019-01-01 RX ORDER — HYDROCODONE BITARTRATE AND ACETAMINOPHEN 5; 325 MG/1; MG/1
2 TABLET ORAL EVERY 4 HOURS PRN
Status: DISCONTINUED | OUTPATIENT
Start: 2019-01-01 | End: 2019-01-01

## 2019-01-01 RX ORDER — ONDANSETRON 2 MG/ML
4 INJECTION INTRAMUSCULAR; INTRAVENOUS EVERY 6 HOURS PRN
Status: DISCONTINUED | OUTPATIENT
Start: 2019-01-01 | End: 2019-01-01

## 2019-01-01 RX ORDER — SIMVASTATIN 20 MG
20 TABLET ORAL NIGHTLY
Qty: 90 TABLET | Refills: 3 | Status: ON HOLD | OUTPATIENT
Start: 2019-01-01 | End: 2019-01-01

## 2019-01-01 RX ORDER — FERROUS SULFATE 324(65)MG
TABLET, DELAYED RELEASE (ENTERIC COATED) ORAL
Qty: 60 TABLET | Refills: 1 | Status: SHIPPED | OUTPATIENT
Start: 2019-01-01 | End: 2019-01-01

## 2019-01-01 RX ORDER — LOSARTAN POTASSIUM 50 MG/1
50 TABLET ORAL DAILY
Qty: 90 TABLET | Refills: 0 | Status: SHIPPED | OUTPATIENT
Start: 2019-01-01 | End: 2019-01-01

## 2019-01-01 RX ORDER — MORPHINE SULFATE 4 MG/ML
4 INJECTION, SOLUTION INTRAMUSCULAR; INTRAVENOUS EVERY 2 HOUR PRN
Status: DISCONTINUED | OUTPATIENT
Start: 2019-01-01 | End: 2019-01-01

## 2019-01-01 RX ORDER — DIPHENHYDRAMINE HCL 25 MG
25 CAPSULE ORAL ONCE
Status: COMPLETED | OUTPATIENT
Start: 2019-01-01 | End: 2019-01-01

## 2019-01-01 RX ORDER — ACETAMINOPHEN 325 MG/1
650 TABLET ORAL ONCE
Status: CANCELLED | OUTPATIENT
Start: 2019-01-01

## 2019-01-01 RX ORDER — TRIAMTERENE AND HYDROCHLOROTHIAZIDE 37.5; 25 MG/1; MG/1
CAPSULE ORAL
Qty: 90 CAPSULE | Refills: 1 | Status: ON HOLD | OUTPATIENT
Start: 2019-01-01 | End: 2019-01-01

## 2019-01-01 RX ORDER — BLOOD-GLUCOSE METER
EACH MISCELLANEOUS
Qty: 1 KIT | Refills: 0 | Status: SHIPPED | OUTPATIENT
Start: 2019-01-01 | End: 2019-01-01

## 2019-01-01 RX ORDER — MORPHINE SULFATE 2 MG/ML
1 INJECTION, SOLUTION INTRAMUSCULAR; INTRAVENOUS EVERY 2 HOUR PRN
Status: DISCONTINUED | OUTPATIENT
Start: 2019-01-01 | End: 2019-01-01

## 2019-01-01 RX ORDER — SODIUM CHLORIDE 0.9 % (FLUSH) 0.9 %
3 SYRINGE (ML) INJECTION AS NEEDED
Status: DISCONTINUED | OUTPATIENT
Start: 2019-01-01 | End: 2019-01-01

## 2019-01-01 RX ORDER — FUROSEMIDE 40 MG/1
20 TABLET ORAL DAILY
Qty: 90 TABLET | Refills: 3 | Status: ON HOLD | OUTPATIENT
Start: 2019-01-01 | End: 2019-01-01

## 2019-01-01 RX ORDER — FERROUS SULFATE TAB EC 324 MG (65 MG FE EQUIVALENT) 324 (65 FE) MG
TABLET DELAYED RESPONSE ORAL
Qty: 60 TABLET | Refills: 1 | Status: ON HOLD | OUTPATIENT
Start: 2019-01-01 | End: 2019-01-01

## 2019-01-01 RX ORDER — DONEPEZIL HYDROCHLORIDE 5 MG/1
10 TABLET, FILM COATED ORAL NIGHTLY
Qty: 90 TABLET | Refills: 3 | Status: SHIPPED | OUTPATIENT
Start: 2019-01-01 | End: 2019-01-01

## 2019-01-01 RX ORDER — SODIUM CHLORIDE 9 MG/ML
INJECTION, SOLUTION INTRAVENOUS ONCE
Status: DISCONTINUED | OUTPATIENT
Start: 2019-01-01 | End: 2019-01-01

## 2019-01-01 RX ORDER — ALBUMIN (HUMAN) 12.5 G/50ML
SOLUTION INTRAVENOUS
Status: DISPENSED
Start: 2019-01-01 | End: 2019-01-01

## 2019-01-01 RX ORDER — LOSARTAN POTASSIUM 50 MG/1
50 TABLET ORAL DAILY
Qty: 90 TABLET | Refills: 1 | Status: SHIPPED | OUTPATIENT
Start: 2019-01-01 | End: 2019-01-01

## 2019-01-01 RX ORDER — DONEPEZIL HYDROCHLORIDE 10 MG/1
10 TABLET, FILM COATED ORAL NIGHTLY
Qty: 90 TABLET | Refills: 3 | Status: SHIPPED | OUTPATIENT
Start: 2019-01-01

## 2019-01-01 RX ORDER — ASPIRIN 81 MG/1
81 TABLET ORAL DAILY
Status: DISCONTINUED | OUTPATIENT
Start: 2019-01-01 | End: 2019-01-01

## 2019-01-01 RX ORDER — FUROSEMIDE 40 MG/1
40 TABLET ORAL DAILY
Qty: 30 TABLET | Refills: 0 | Status: SHIPPED | OUTPATIENT
Start: 2019-01-01

## 2019-01-01 RX ORDER — PANTOPRAZOLE SODIUM 40 MG/1
40 TABLET, DELAYED RELEASE ORAL
Status: DISCONTINUED | OUTPATIENT
Start: 2019-01-01 | End: 2019-01-01

## 2019-01-01 RX ORDER — METOPROLOL SUCCINATE 50 MG/1
TABLET, EXTENDED RELEASE ORAL
Qty: 90 TABLET | Refills: 0 | Status: SHIPPED | OUTPATIENT
Start: 2019-01-01 | End: 2019-01-01

## 2019-01-01 RX ORDER — METOPROLOL SUCCINATE 50 MG/1
TABLET, EXTENDED RELEASE ORAL
Qty: 90 TABLET | Refills: 1 | Status: ON HOLD | OUTPATIENT
Start: 2019-01-01 | End: 2019-01-01

## 2019-01-01 RX ORDER — DEXTROSE MONOHYDRATE 25 G/50ML
50 INJECTION, SOLUTION INTRAVENOUS AS NEEDED
Status: DISCONTINUED | OUTPATIENT
Start: 2019-01-01 | End: 2019-01-01

## 2019-01-01 RX ORDER — ACETAMINOPHEN 325 MG/1
650 TABLET ORAL EVERY 6 HOURS PRN
Status: DISCONTINUED | OUTPATIENT
Start: 2019-01-01 | End: 2019-01-01

## 2019-01-01 RX ORDER — BLOOD SUGAR DIAGNOSTIC
STRIP MISCELLANEOUS
Qty: 100 STRIP | Refills: 3 | Status: SHIPPED | OUTPATIENT
Start: 2019-01-01 | End: 2019-01-01

## 2019-01-01 RX ORDER — HEPARIN SODIUM 5000 [USP'U]/ML
5000 INJECTION, SOLUTION INTRAVENOUS; SUBCUTANEOUS EVERY 12 HOURS SCHEDULED
Status: DISCONTINUED | OUTPATIENT
Start: 2019-01-01 | End: 2019-01-01

## 2019-01-01 RX ORDER — ALBUMIN (HUMAN) 12.5 G/50ML
SOLUTION INTRAVENOUS
Status: COMPLETED
Start: 2019-01-01 | End: 2019-01-01

## 2019-01-01 RX ORDER — ASCORBIC ACID 500 MG
500 TABLET ORAL 2 TIMES DAILY
COMMUNITY

## 2019-01-01 RX ORDER — FERROUS SULFATE 324(65)MG
TABLET, DELAYED RELEASE (ENTERIC COATED) ORAL
Qty: 30 TABLET | Refills: 3 | Status: SHIPPED | OUTPATIENT
Start: 2019-01-01 | End: 2019-01-01

## 2019-01-01 RX ORDER — NATEGLINIDE 120 MG/1
1 TABLET, COATED ORAL 2 TIMES DAILY
Refills: 1 | COMMUNITY
Start: 2019-01-01 | End: 2019-01-01

## 2019-01-01 RX ORDER — FUROSEMIDE 40 MG/1
40 TABLET ORAL DAILY
Status: DISCONTINUED | OUTPATIENT
Start: 2019-01-01 | End: 2019-01-01

## 2019-01-01 RX ORDER — SULFAMETHOXAZOLE AND TRIMETHOPRIM 800; 160 MG/1; MG/1
1 TABLET ORAL 2 TIMES DAILY
Qty: 14 TABLET | Refills: 0 | Status: SHIPPED | OUTPATIENT
Start: 2019-01-01 | End: 2019-01-01

## 2019-01-01 RX ORDER — DIPHENHYDRAMINE HCL 25 MG
CAPSULE ORAL
Status: COMPLETED
Start: 2019-01-01 | End: 2019-01-01

## 2019-01-01 RX ORDER — 0.9 % SODIUM CHLORIDE 0.9 %
VIAL (ML) INJECTION
Status: DISCONTINUED
Start: 2019-01-01 | End: 2019-01-01

## 2019-01-01 RX ORDER — ACETAMINOPHEN 325 MG/1
650 TABLET ORAL ONCE
Status: COMPLETED | OUTPATIENT
Start: 2019-01-01 | End: 2019-01-01

## 2019-01-01 RX ORDER — SODIUM CHLORIDE, SODIUM LACTATE, POTASSIUM CHLORIDE, CALCIUM CHLORIDE 600; 310; 30; 20 MG/100ML; MG/100ML; MG/100ML; MG/100ML
INJECTION, SOLUTION INTRAVENOUS CONTINUOUS
Status: DISCONTINUED | OUTPATIENT
Start: 2019-01-01 | End: 2019-01-01

## 2019-01-01 RX ORDER — ASCORBIC ACID 500 MG
500 TABLET ORAL 2 TIMES DAILY
Status: DISCONTINUED | OUTPATIENT
Start: 2019-01-01 | End: 2019-01-01

## 2019-01-01 RX ORDER — LIDOCAINE HYDROCHLORIDE 20 MG/ML
INJECTION, SOLUTION EPIDURAL; INFILTRATION; INTRACAUDAL; PERINEURAL
Status: COMPLETED
Start: 2019-01-01 | End: 2019-01-01

## 2019-01-01 RX ORDER — SPIRONOLACTONE 50 MG/1
50 TABLET, FILM COATED ORAL DAILY
Status: DISCONTINUED | OUTPATIENT
Start: 2019-01-01 | End: 2019-01-01

## 2019-01-01 RX ORDER — MORPHINE SULFATE 2 MG/ML
2 INJECTION, SOLUTION INTRAMUSCULAR; INTRAVENOUS EVERY 2 HOUR PRN
Status: DISCONTINUED | OUTPATIENT
Start: 2019-01-01 | End: 2019-01-01

## 2019-01-01 RX ORDER — LOSARTAN POTASSIUM 50 MG/1
TABLET ORAL
Qty: 90 TABLET | Refills: 1 | Status: ON HOLD | OUTPATIENT
Start: 2019-01-01 | End: 2019-01-01

## 2019-01-01 RX ORDER — BLOOD-GLUCOSE METER
EACH MISCELLANEOUS
Qty: 1 KIT | Refills: 0 | Status: SHIPPED | OUTPATIENT
Start: 2019-01-01

## 2019-01-01 RX ORDER — HYDROCODONE BITARTRATE AND ACETAMINOPHEN 5; 325 MG/1; MG/1
1 TABLET ORAL EVERY 4 HOURS PRN
Status: DISCONTINUED | OUTPATIENT
Start: 2019-01-01 | End: 2019-01-01

## 2019-01-01 RX ADMIN — ACETAMINOPHEN 650 MG: 325 TABLET ORAL at 12:48:00

## 2019-01-01 RX ADMIN — ALBUMIN (HUMAN) 100 ML: 12.5 SOLUTION INTRAVENOUS at 10:44:00

## 2019-01-01 RX ADMIN — DIPHENHYDRAMINE HCL 25 MG: 25 MG CAPSULE ORAL at 12:48:00

## 2019-01-01 RX ADMIN — ALBUMIN (HUMAN) 100 ML: 12.5 SOLUTION INTRAVENOUS at 10:28:00

## 2019-01-01 RX ADMIN — ALBUMIN (HUMAN) 100 ML: 12.5 SOLUTION INTRAVENOUS at 10:08:00

## 2019-01-01 RX ADMIN — ALBUMIN (HUMAN) 100 ML: 12.5 SOLUTION INTRAVENOUS at 12:06:00

## 2019-01-01 RX ADMIN — ALBUMIN (HUMAN) 100 ML: 12.5 SOLUTION INTRAVENOUS at 11:50:00

## 2019-01-01 RX ADMIN — ALBUMIN (HUMAN) 100 ML: 12.5 SOLUTION INTRAVENOUS at 12:22:00

## 2019-01-16 NOTE — TELEPHONE ENCOUNTER
Brand changed as requested below.     Refill Protocol Appointment Criteria  · Appointment scheduled in the past 12 months or in the next 3 months  Recent Outpatient Visits            1 month ago Type 2 diabetes mellitus without complication, without long-te

## 2019-01-16 NOTE — TELEPHONE ENCOUNTER
Sangeeta Simpson LANCETS 46C Does not apply Misc    Comments: alternative requested insurance wants accu-check can you please send rx for new meter , strips, and lancets

## 2019-02-14 NOTE — PROGRESS NOTES
Cancer Center Progress Note    Patient Name: Jose Guadalupe Mendiola   YOB: 1940   Medical Record Number: [id number]   CSN: [id number]   Consulting Physician: Castillo Moody MD  Referring Physician(s): Rafa Cline  Date of Visit: 02/14/2019     Marshall it performed in 6/2018. His repeat procedure shows 2 areas concerning for invasive esophageal squamous cell carcinoma. Based on his more invasive disease involvement, pt was recommended for SBRT with Dr. Link Griffith.  He was undergoing radiation therapy and un swallowing    • Thrombocytopenia (HCC)    • Visual impairment     glasses       Past Surgical History:  Past Surgical History:   Procedure Laterality Date   • CATARACT EXTRACTION Bilateral 2013    Dr. Rima Acevedo   • CHOLECYSTECTOMY     • COLONOSCOPY     • COLON children: 5      Years of education: Not on file      Highest education level: Not on file    Social Needs      Financial resource strain: Not on file      Food insecurity - worry: Not on file      Food insecurity - inability: Not on file      Transportati night sweats and weight loss. Eyes: Negative for visual disturbance, irritation and redness. Respiratory: Negative for cough, hemoptysis, chest pain, or dyspnea on exertion.   Gastrointestinal: Negative for odynophagia or dysphagia, reflux symptoms, nause 11/24/2018 04:45 AM    HGB 11.0 (L) 11/24/2018 04:45 AM    HCT 33.2 (L) 11/24/2018 04:45 AM    MCV 85.8 11/24/2018 04:45 AM    MCH 28.4 11/24/2018 04:45 AM    MCHC 33.1 11/24/2018 04:45 AM    RDW 28.4 (H) 11/24/2018 04:45 AM    PLT 64 (L) 11/24/2018 04:45 METABOLIC PANEL (14), IRON AND         TIBC, FERRITIN, CBC WITH DIFFERENTIAL WITH         PLATELET, FERRITIN, IRON AND TIBC, CBC W/         DIFFERENTIAL    (B63.748) History of tobacco use  [de-identified]year old male with several comorbid conditions including prior not being planned for further radiation therapy at this time.     --we've discussed that his cancer who no longer safely be treated with radiation, is not being recommended for surgery or chemotherapy so this cancer will likely continue to progress over radha such as blasts on differential  -leukopenia like this can also be a feature of pts with ESLD/cirrhosis with alternating normal and lowered WBC values  -low clinical suspicion for an acute hematologic malignant process    6.) Esophageal stricture    --will

## 2019-02-14 NOTE — TELEPHONE ENCOUNTER
Hgb 6.7, call from [facility], message from Dr Cathy Black with orders for one unit of packed red blood cells. Transfusion coordiated for tomorrow at 12 noon.  Call to [facility], patient's wife with instruction to bring him to outpatient [facility] now and have t

## 2019-02-14 NOTE — PROGRESS NOTES
Palliative Care Follow Up Note     Patient Name: Zuleima Vazqeuz   YOB: 1940   Medical Record Number: [id number]   Date of visit: 2/14/2019     Chief Complaint/Reason for Visit:  Patient presents with:  Divya Connelly \"Brian\" jackson Diagnosis Date   • Back problem     pain   • Chicken pox    • Cirrhosis ([facility] [address]    • Colon polyp    • Diabetes ([facility]    • Esophageal cancer (HCC)     current   • Esophageal varices (HCC)    • Exposure to medical diagnostic radiation    • Hearing impairment Relation Age of Onset   • Cancer Mother 79        lung cancer; tobacco related   • Other (Unknown cause) Brother 61   • Other (WWII) Father 46        Cause of death   • Other (Other) Maternal Grandmother    • Other (Other) Maternal Grandfather    • Other ( Glucose Blood (ACCU-CHEK AILYN PLUS) In Vitro Strip Test blood sugar once daily Disp: 100 strip Rfl: 3   Blood Glucose Monitoring Suppl (ACCU-CHEK AILYN PLUS) w/Device Does not apply Kit Test blood sugar once daily Disp: 1 kit Rfl: 0   ACCU-CHEK MULTICLI Normal rate. Murmur heard. Pulmonary/Chest: Effort normal and breath sounds normal. No respiratory distress. He has no wheezes. Abdominal: Soft. Bowel sounds are normal. He exhibits no distension. There is no tenderness.    Musculoskeletal: Normal rang emotional support to pt/family who appear to be coping adequately  -See above narrative for further details      Advance care planning counseling/discussion  -Pt is already DNR/DNI  -#1 HCPOA is Arpit Valle (# [phone])- TERRY paperwork on file  -S

## 2019-02-14 NOTE — PATIENT INSTRUCTIONS
Please call [facility] with any Palliative Care questions/concerns @ [phone]. [facility] will call Leonides Husain with 3 month follow up appointment after appointment with Dr. Cristel Murillo is scheduled.

## 2019-02-15 NOTE — PROGRESS NOTES
Pt here for blood transfusion. To receive 1 unit(s). Lab Results   Component Value Date    HGB 6.7 (LL) 02/14/2019    HCT 22.1 (L) 02/14/2019     Complains of short of breath with exertion. Nhi Bill Appeared to tolerate treatment well.   No S/S of adverse rxn no

## 2019-02-20 NOTE — TELEPHONE ENCOUNTER
Spoke to patients wife [facility] and informed her of the normal vit F48 and folic acid results. [facility] verbalized understanding.  verified in Gloria

## 2019-02-20 NOTE — TELEPHONE ENCOUNTER
[employer] for authorization of approval for MRI brain wo cpt code [id number] was given with Authorization # [id number] effective 02/20/19 to 03/21/19. Will call Pt. To inform. Janie Sinha informed of approval. She will call to schedule appt.

## 2019-02-20 NOTE — PROGRESS NOTES
Neurology Initial Visit     Referred By: Dr. Colindres ref. provider found    Chief Complaint: Patient presents with:  Memory Loss: Patient presents today for memory loss, going on for years but progressively getting worst recently.  Patient presents with wife w EXTRACTION Bilateral 2013    Dr. Keri Castillo   • CHOLECYSTECTOMY     • COLONOSCOPY     • COLONOSCOPY N/A 5/24/2018    Performed by Ruddy Matthews MD at [facility] ENDOSCOPY   • COLONOSCOPY & POLYPECTOMY  4/16/15   • ENDOSCOPIC ULTRASOUND (EUS) N/A 6/21/2018    Perfo daily., Disp: , Rfl: 2  •  nateglinide 120 MG Oral Tab, 1 tablet 2 (two) times daily. , Disp: , Rfl: 1  •  Donepezil HCl 5 MG Oral Tab, Take 2 tablets (10 mg total) by mouth nightly., Disp: 90 tablet, Rfl: 3  •  Sertraline HCl 25 MG Oral Tab, Take 1 tablet atraumatic  Eyes- No redness or swelling  ENT- Hearing intake, smell preserved, normal glutition  Neck- No masses or adenopathy  Cv: pulses were palpable and normal, no cyanosis or edema     Neurological:     Mental Status- Alert and oriented x1.   MoCA was CO2 23 11/24/2018      I have reviewed labs. Assessment   1.  Memory loss  Most likely multifactorial due to some alcohol of dementia but also some progression since stopping drinking therefore there could be concern for some other neurodegenerative

## 2019-02-20 NOTE — TELEPHONE ENCOUNTER
----- Message from Ivan Evans MD sent at 2/20/2019 11:13 AM CST -----  Please let the patient know that results of these particular lab tests so far were normal.    Thank you

## 2019-02-20 NOTE — TELEPHONE ENCOUNTER
S/w Kerri at [facility] who stated that prescription for Donepezil 5mg tabs (2 tabs nightly) would not be covered by insurance. Explained that patient is starting Donepezil and would take 5mg nightly for one week then increase to 10mg nightly.  Kerri verb

## 2019-03-11 NOTE — TELEPHONE ENCOUNTER
Left detailed message for wife notifying her that Dr. Lorena Delvalle ordered doppler, holter monitor and lipid panel. Phone number provided to schedule testing. Asked them to call the office if they had questions.

## 2019-03-11 NOTE — TELEPHONE ENCOUNTER
I have called the patient's wife and explained MRI findings, and the fact that I am ordering some additional tests. Please let her know how to schedule them.

## 2019-03-14 NOTE — ANESTHESIA PREPROCEDURE EVALUATION
Anesthesia PreOp Note    HPI:     Amaury Blanton is a 78year old male who presents for preoperative consultation requested by: Eddie Boggs MD    Date of Surgery: 3/14/2019    Procedure(s):  ESOPHAGOGASTRODUODENOSCOPY (EGD)  Indication: Malignant neoplas without long-term current use of insulin ([facility] [address]         Date Noted: 11/07/2016      Essential hypertension with goal blood pressure less than 130/85         Date Noted: 11/07/2016      MGD (meibomian gland dysfunction)         Date Noted: 06/09/2016      Hy ESOPHAGOGASTRODUODENOSCOPY (EGD) N/A 8/15/2018    Performed by Sophie Gutierrez MD at [facility] ENDOSCOPY   • ESOPHAGOGASTRODUODENOSCOPY (EGD) N/A 6/21/2018    Performed by Nobie Brittle, MD at [facility] ENDOSCOPY   • ESOPHAGOGASTRODUODENOSCOPY (EGD) N/A 5/24/2018 once daily Disp: 100 each Rfl: 3 Taking   Triamterene-HCTZ 37.5-25 MG Oral Cap TAKE 1 CAPSULE BY MOUTH EVERY DAY IN THE MORNING Disp: 90 capsule Rfl: 1 Taking   docusate sodium 100 MG Oral Cap Take 100 mg by mouth daily.  Disp:  Rfl:  Taking   Melatonin 5 M Days per week: Not on file        Minutes per session: Not on file      Stress: Not on file    Relationships      Social connections:        Talks on phone: Not on file        Gets together: Not on file        Attends [employer] service: Not on file 109.3 kg (241 lb). His blood pressure is 105/52 and his pulse is 75. His respiration is 16 and oxygen saturation is 98%.     03/14/19  0708   BP: 105/52   Pulse: 75   Resp: 16   SpO2: 98%   Weight: 109.3 kg (241 lb)   Height: 1.829 m (6')        Anesthesia

## 2019-03-15 NOTE — TELEPHONE ENCOUNTER
----- Message from Sherren Holly, MD sent at 3/15/2019  9:28 AM CDT -----  Please let the patient know that LDL, or bad cholesterol is still slightly elevated for somebody who has had a stroke in the past, therefore I would suggest to increase the do

## 2019-03-15 NOTE — PROGRESS NOTES
Pt here for 2 Units of blood for Hgb 6.4  Arrived ambulatory with his spouse   Complains of ongoing  short of breath with exertion. Casi Henry    He received 1 unit of blood 2/15 and tolerated well  2 Units of blood transfused without adverse reaction  Appeared to to

## 2019-03-15 NOTE — TELEPHONE ENCOUNTER
Rosalina Isabel MD  P yrl Friend Nurse             Please let the patient know that LDL, or bad cholesterol is still slightly elevated for somebody who has had a stroke in the past, therefore I would suggest to increase the dose of statin.  If he a

## 2019-03-18 NOTE — TELEPHONE ENCOUNTER
Simvastatin 10mg. Take 1 tablet nightly. #90. No refills. 3 refills.     Last filled 5/17/2018    LOV-2/20/2019  NOV-none

## 2019-03-25 NOTE — TELEPHONE ENCOUNTER
----- Message from Kelsi Quintanilla MD sent at 3/25/2019  7:44 AM CDT -----  Please let the patient know that doppler of carotids showed some narrowing, will need to repeat it in 6 months to assess further, at this point no surgery is indicated yet.

## 2019-03-25 NOTE — TELEPHONE ENCOUNTER
Left detailed message of Dr. John Vizcarra remarks(hipaa verified). Also to call the office with questions.

## 2019-03-25 NOTE — PROGRESS NOTES
Please let the patient know that doppler of carotids showed some narrowing, will need to repeat it in 6 months to assess further, at this point no surgery is indicated yet.     Thank you

## 2019-03-26 NOTE — TELEPHONE ENCOUNTER
Pt was seen in the office today, 3/26/19. Dr. Walter Coleman increased simvistatin dosage to 20mg qHs.  New prescription called into [facility] at [facility] in [address] [place] [address] per pt request, confirmed w/ Darren Muller we will e-scribe a new prescription so refills will be set up appropri

## 2019-03-26 NOTE — PROGRESS NOTES
Neurology follow-up visit     Referred By: Dr. Colindres ref. provider found    Chief Complaint: Patient presents with:  Neurologic Problem: LOV: 2/20/19 - Pt presents w/ wife for f/u of late onset alzheimer's disease/memory loss.  Pt has had blood transfusion s consideration for any nursing home placement. Donepezil was started in February 2019. All the blood work to look for any treatable causes of memory decline was unremarkable.     Past Medical History:   Diagnosis Date   • Back problem     pain   • Flip OF KIDNEY STONE Left 1985    Open   • UPPER GI ENDOSCOPY,BIOPSY  4/16/15   • UPPER GI ENDOSCOPY,EXAM         Social history:    Smoking status: Former Smoker 2.00 Packs/day For 40.00 Years   Types: Cigarettes   Quit date: 1/1/1998   Smokeless tobacco: Sussy Montiel Glucose Blood (ACCU-CHEK AILYN PLUS) In Vitro Strip, Test blood sugar once daily, Disp: 100 strip, Rfl: 3  •  Blood Glucose Monitoring Suppl (ACCU-CHEK AILYN PLUS) w/Device Does not apply Kit, Test blood sugar once daily, Disp: 1 kit, Rfl: 0  •  ACCU-CHEK bilateral symmetric  Brachioradialis 2 + bilateral symmetric  Patellar 1 bilateral symmetric  Ankle jerk 0 bilateral symmetric    No clonus  No Babinski sign    Coordination:  Finger to nose intact  Rapid alternating movements intact    Gait:  Normal postu given. All questions were answered. All side effects of drugs were discussed. Return to clinic in: Return in about 3 months (around 6/26/2019).     Surya Light MD

## 2019-03-27 NOTE — TELEPHONE ENCOUNTER
Pt's spouse requesting orders for follow up labs be added to the chart. Pt coming in on 4/4. Please call back with confirmation once added.

## 2019-04-04 PROBLEM — E11.8 DIABETES MELLITUS WITH COMPLICATION (HCC): Status: ACTIVE | Noted: 2019-01-01

## 2019-04-04 PROBLEM — I50.20 SYSTOLIC CONGESTIVE HEART FAILURE (HCC): Status: ACTIVE | Noted: 2019-01-01

## 2019-04-04 NOTE — PROGRESS NOTES
Sylvia Nguyen is a 78year old male. HPI:   1. Type 2 diabetes mellitus without complication (HCC)    The patient has been taking all prescribed diabetic medications at home and has been following a diabetic diet.  Recent HgA1c was 12.4 Patient advised to fo Tab Take 1 tablet by mouth 2 (two) times daily. Disp:  Rfl: 2   nateglinide 120 MG Oral Tab 1 tablet 2 (two) times daily. Disp:  Rfl: 1   Ferrous Sulfate 324 (65 Fe) MG Oral Tab EC Take 1 tablet by mouth 2 (two) times daily.  Disp: 30 tablet Rfl: 3   [place] Smoker        Packs/day: 2.00        Years: 40.00        Pack years: [de-identified]        Types: Cigarettes        Quit date: 1998        Years since quittin.2      Smokeless tobacco: Never Used    Alcohol use: No      Alcohol/week: 0.0 oz      Frequency: Ne weekly will help to curb one's appetite, control weight and lead to better blood pressure control.    - BASIC METABOLIC PANEL (8); Future    3.  Squamous cell esophageal cancer (HCC)    Mendez see Dr Riccardo Lucas in 3 months for further biopsies for endoscopic proc

## 2019-04-05 NOTE — PROGRESS NOTES
Bladder scanned pt. Which showed 427 cc. Patient having some mild discomfort. He reports that he urinates frequently. Straight cath patient and removed 550 cc. Will continue to monitor. no

## 2019-04-18 NOTE — PROGRESS NOTES
Wilfredotino Gregory is a 78year old male. HPI:   1. Type 2 diabetes mellitus without complication (HCC)    The patient has been taking all prescribed diabetic medications at home and has been following a diabetic diet.  Recent HgA1c was 12.4 Patient advised to fo nightly. Disp: 90 tablet Rfl: 3   Sertraline HCl 25 MG Oral Tab Take 1 tablet (25 mg total) by mouth nightly. Disp: 90 tablet Rfl: 3   simvastatin 20 MG Oral Tab Take 1 tablet (20 mg total) by mouth nightly.  Disp: 90 tablet Rfl: 3   METOPROLOL SUCCINATE ER Thrombocytopenia (HCC)    • Visual impairment     glasses      Social History:  Social History    Tobacco Use      Smoking status: Former Smoker        Packs/day: 2.00        Years: 40.00        Pack years: [de-identified]        Types: Cigarettes        Quit date: 1/1 as another anti-hypertensive medicines exactly as prescribed and to follow a low salt diet as discussed. Regular exercise at least 3 times weekly will help to curb one's appetite, control weight and lead to better blood pressure control.     3. Squamous adriano

## 2019-04-23 NOTE — TELEPHONE ENCOUNTER
Spoke with wife Lazara Garcia ( and GUY verified) and relayed PVR message below--she verbalizes understanding and agreement.     She verbalizes understanding and agreement--she will need to call back to schedule f/u appt with PVR, as she has to check her jt

## 2019-04-23 NOTE — TELEPHONE ENCOUNTER
The patient's wife is calling to stated that the patient saw Dr. Jarad Warner on the 4/18/19 and not improved. Since taking the Lasix 40 mg on 4/4/19 he only lost 4 lbs.     Today she discussed his case with Dr. Beth Silva who suggested his primary physician ord

## 2019-04-23 NOTE — TELEPHONE ENCOUNTER
I will order #30 25 mg SPIRONOLACTONE FOR PATIENT. He should take 1 daily along with the lasix 40 mg and see me in 1 month in office.

## 2019-04-25 NOTE — TELEPHONE ENCOUNTER
Failed protocol due to elevated a1c.    Diabetes Medications  Protocol Criteria:  · Appointment scheduled in the past 6 months or the next 3 months  · A1C < 7.5 in the past 6 months  · Creatinine in the past 12 months  · Creatinine result < 1.5   Recent Out Briana Machado MD    Office Visit    2 weeks ago Type 2 diabetes mellitus without complication, without long-term current use of insulin [facility])    [facility], [address], [place] Junior Monk MD    Office Visit    4 cal

## 2019-05-08 NOTE — TELEPHONE ENCOUNTER
Patient wife calling from work (wife is a tech in Endoscopy at [employer]),  not with patient stating that last night patient had a skin tear at the site of his hiatal hernia on his abdomen and was bleeding for a few minutes.  States she had a diffic

## 2019-05-08 NOTE — TELEPHONE ENCOUNTER
Please let patient know a referral is not needed to see Dr. Capo Marcelino since patient has a PPO insurance. Also please keep apt with dr Chris Rodriguez.  Dr Swapnil Vicente [place] a week

## 2019-05-08 NOTE — TELEPHONE ENCOUNTER
Dr. Steven Deleon just ordered some in April. They can wait till the apt and see if Dr. Susi Welch would like to check anything else.

## 2019-05-15 NOTE — TELEPHONE ENCOUNTER
Refill passed per [facility], [facility] protocol.   Hypertensive Medications  Protocol Criteria:  · Appointment scheduled in the past 6 months or in the next 3 months  · BMP or CMP in the past 12 months  · Creatinine result < 2  Recent Outpatient Visits 04/13/2019

## 2019-05-16 NOTE — TELEPHONE ENCOUNTER
Pt's wife Oswaldo Campo states pt has an appt with PVR on 5/23, pt has been taking Lasix and last OV additional water pill (spironolactone 25mg) was prescribed. Pt has gained about 5 lbs instead of losing wt.       Wife is asking if pt needs blood works before

## 2019-05-16 NOTE — TELEPHONE ENCOUNTER
Advise patients wife to continue monitoring the weight, and discuss at [address] with Dr. Jose Guadalupe Ritter.  I put in an order for BMP for patient to get done Saturday

## 2019-05-17 NOTE — TELEPHONE ENCOUNTER
Please advise to restrict fluid intake as this could attribute to the weight gain. I will review BMP once in.  Advise that if patient becomes more symptomatic over the weekend ie more short of breath with and without exertion, increase in swelling she shoul

## 2019-05-17 NOTE — TELEPHONE ENCOUNTER
Returned call, spouse advised of recommendations per A. A. PRINCESS, spouse verbalized understanding and agreed. She will await return call once results are reviewed. No other questions at this time.

## 2019-05-17 NOTE — TELEPHONE ENCOUNTER
Janie Sinha spouse indicated that patient weighs 259 pounds this morning. Patient went to do the BMP this morning in [place]- in process currently. Patient gets short of breath with exertion, but otherwise denies him being in distress.  Patient wearing compre

## 2019-05-23 NOTE — PROGRESS NOTES
Amaury Blanton is a 78year old male. HPI:   1. Type 2 diabetes mellitus without complication (HCC)    The patient has been taking all prescribed diabetic medications at home and has been following a diabetic diet.  Recent HgA1c was 12.4 Patient advised to fo Vitro Strip Test blood sugar once daily Disp: 100 strip Rfl: 3   SPIRONOLACTONE 25 MG Oral Tab TAKE 1 TABLET BY MOUTH EVERY DAY Disp: 30 tablet Rfl: 0   LOSARTAN POTASSIUM 50 MG Oral Tab TAKE 1 TABLET (50 MG TOTAL) BY MOUTH DAILY.  Disp: 90 tablet Rfl: 1 [facility])    • Visual impairment     glasses      Social History:  Social History    Tobacco Use      Smoking status: Former Smoker        Packs/day: 2.00        Years: 40.00        Pack years: [de-identified]        Types: Cigarettes        Quit date: 1/1/1998        Year HEMOGLOBIN A1C; Future    2. Essential hypertension with goal blood pressure less than 130/85    Continue LOSARTAN 50 MG DAILY BECAUSE OF COUGH as another anti-hypertensive medicines exactly as prescribed and to follow a low salt diet as discussed.  Regular

## 2019-05-23 NOTE — TELEPHONE ENCOUNTER
Pt's wife Samaria Moody states pt was seen by Dr Renata Rodriguez and recommended to see Dr Lona Abraham , general surgeon for consult regarding abdominal hernia. Wife called to schedule appt and scheduled set on July 10. However, pt still needs referral to surgeon.

## 2019-05-28 NOTE — TELEPHONE ENCOUNTER
[address] [facility] if wife calls back please let her know that his referral has been signed The patient is a 32y Female complaining of chest discomfort.

## 2019-05-28 NOTE — TELEPHONE ENCOUNTER
Patient called back today, she is in [place], [place] at present. She has the referral and has already made the appointment.

## 2019-05-29 NOTE — TELEPHONE ENCOUNTER
Dr. Ariana Hollingsworth please advise. Med listed as discontinued by you but do not see mention of it in OV note from 4/4/19, please confirm and we can contact patient.     Name from pharmacy: [id number] [place] [address] CP          Will file in chart as: TRIAMTERENE

## 2019-06-06 NOTE — TELEPHONE ENCOUNTER
Received call from Ronnie Downing regarding critically low blood glucose from today.   Call placed to [facility] patient's wife who states she kept him NPO all morning for bloodwork but he has since had breakfast.  She is at the store and will repeat his blood

## 2019-06-11 NOTE — TELEPHONE ENCOUNTER
Wife called requesting 90 day supply of spironolactone. Per wife, patient is still retaining fluid in lower extremities and his abdomen is really big.    Per Dr. Jose Elias Monk's last office visit note, patient's weight was to be monitored to see if medication

## 2019-06-13 PROBLEM — R18.8 OTHER ASCITES: Status: ACTIVE | Noted: 2019-01-01

## 2019-06-13 NOTE — PROGRESS NOTES
Palliative Care Follow Up Note     Patient Name: Randa Early   YOB: 1940   Medical Record Number: [id number]   Date of visit: 6/13/19     Chief Complaint/Reason for Visit:  Patient presents with:  Divya Connelly \"Brian\" presents complication, without long-term current use of insulin (HCC)     Essential hypertension with goal blood pressure less than 130/85     Cough     Nightmares     Diabetic retinopathy associated with controlled type 2 diabetes mellitus (HCC)     Class 1 obesit 12/6/2018    Performed by Emelina Contreras MD at [facility] ENDOSCOPY   • ESOPHAGOGASTRODUODENOSCOPY (EGD) N/A 11/23/2018    Performed by Amairani Gallardo MD at [facility] ENDOSCOPY   • ESOPHAGOGASTRODUODENOSCOPY (EGD) N/A 10/11/2018    Performed by Emelina Contreras MD a former   Hx of Substance Use/Abuse: former ETOH abuse, no illicits known     Cultural Information  Ethnicity: Born in [facility]    Current living situation:   Patient lives in a condo with his wife Dale Stanford.  While Dale Stanford is at work, her sister comes over times daily before meals. 30 minutes before breakfast and dinner. Disp: 180 tablet Rfl: 1   docusate sodium 100 MG Oral Cap Take 100 mg by mouth daily. Disp:  Rfl:    Melatonin 5 MG Oral Tab Take by mouth.  Disp:  Rfl:        Review of Systems:  Review of S well-nourished. No distress. Obese, chronically ill-appearing, cooperative   HENT:   Head: Normocephalic and atraumatic. Neck: Normal range of motion. Neck supple. Cardiovascular: Normal rate. Murmur heard.   Pulmonary/Chest: Effort normal and breat DNR/DNI  -Entered DNR order in to Epic today- Epic reflected full code status- verified DNR status with pt/wife  -Pt/wife want to continue pursuing supportive medical treatments including esophageal dilitations, if necessary .  -Provided emotional support

## 2019-06-13 NOTE — PATIENT INSTRUCTIONS
Please call Eduardo Childress with any Palliative Care questions/concerns @ [phone].     Follow up with Eduardo Childress in 3 months.

## 2019-06-13 NOTE — ED PROVIDER NOTES
Patient Seen in: [facility] AND [facility] Emergency Department    History   Patient presents with:  GI Bleeding (gastrointestinal)    Stated Complaint: GI bleed    HPI    Patient presents the emergency department complaining of rectal bleeding.   Wife states chelsy • ESOPHAGOGASTRODUODENOSCOPY (EGD) N/A 11/23/2018    Performed by Timoteo Clark MD at [facility] ENDOSCOPY   • ESOPHAGOGASTRODUODENOSCOPY (EGD) N/A 10/11/2018    Performed by Arabella Lane MD at [facility] ENDOSCOPY   • ESOPHAGOGASTRODUODENOSCOPY (EGD) N/A 8/15/2 and regular rhythm. No murmur heard. Pulmonary/Chest: Effort normal and breath sounds normal. No respiratory distress. Abdominal: Soft. He exhibits distension. He exhibits no mass. There is no tenderness. There is no guarding.    There appears to be ma CBC W/ DIFFERENTIAL[[id number]]          Abnormal            Final result                 Please view results for these tests on the individual orders. TYPE AND SCREEN    Narrative:      The following orders were created for panel order TYPE AN Discharge Medication List

## 2019-06-13 NOTE — ED NOTES
Pt presents to ED with a c/o rectal bleeding. States hx of previous blood transfusions. Skin pale. Vitals stable. ABD distended but non tender, hx of ascites. Last blood transfusion was in March '19 per family member. Denies taking anticoagulants.      20G

## 2019-06-13 NOTE — PROGRESS NOTES
History and Physical      Amairani Pod is a 78year old male. HPI   Patient presents with:  Hernia: Patient has large, protruding hernia from navel with a closed wound on the end. Patient's wife states he has had for about 3 years. Denies pain.   Perla Bella ESOPHAGOGASTRODUODENOSCOPY (EGD) N/A 8/15/2018    Performed by Ruddy Matthews MD at [address] [place] [address] ENDOSCOPY   • ESOPHAGOGASTRODUODENOSCOPY (EGD) N/A 6/21/2018    Performed by Andressa Menezes MD at [address] [place] [address] ENDOSCOPY   • ESOPHAGOGASTRODUODENOSCOPY (EGD) N/A 5/24/2018 Rfl: 2   Pantoprazole Sodium 40 MG Oral Tab EC Take 1 tablet (40 mg total) by mouth 2 (two) times daily before meals. 30 minutes before breakfast and dinner. Disp: 180 tablet Rfl: 1   docusate sodium 100 MG Oral Cap Take 100 mg by mouth daily.  Disp:  Rfl: normal to inspection lungs are clear to auscultation bilaterally normal respiratory effort  Cardiovascular: regular rate and rhythm no murmurs, gallups, or rubs  Abdomen: soft non-tender non-distended no organomegaly noted no masses  Extremities: no edema,

## 2019-06-13 NOTE — ED INITIAL ASSESSMENT (HPI)
Patient here with c/o dark red bloody stool this morning. Hx of GI bleeds and blood transfusions. Denies anticoagulants. Sent from cancer center.

## 2019-06-13 NOTE — IMAGING NOTE
Pt to ultrasound room from ER #[id number] scouts taken by A SHREYA RT    SEE Epic NOTE: Hepatitis and cirrhosis and tx for SCCa esophageal ca included radiation tx.: Hx taken procedure explained questions answered     Patient consented./ SPOUSE SIGNED    Pt to ge

## 2019-06-14 NOTE — TELEPHONE ENCOUNTER
Refill passed per [facility] protocol.   Hypertensive Medications  Protocol Criteria:  · Appointment scheduled in the past 6 months or in the next 3 months  · BMP or CMP in the past 12 months  · Creatinine result < 2  Recent Outpatient Visits

## 2019-06-16 NOTE — TELEPHONE ENCOUNTER
The patient's son called to see if Dr. Juanita Lobo agreed on recommending hospice care for his father. The patient's son states that the patient most recently was seen by palliative care and a meeting was recommended with hospice.   He states his father was sen palliative care for multiple other comorbidities, and appears that the main issue is the patient's advanced liver disease with cirrhosis.     I discussed the services provided by Medicare, and that hospice agencies have contracts with certain facilities to

## 2019-06-17 NOTE — TELEPHONE ENCOUNTER
I telephoned the patient's son, Nimesh Palmer, to discuss patient's diagnosis and prognosis regarding esophageal cancer.   Reviewed with Nimesh Palmer that his father has invasive disease, stage 1 esophageal cancer; however, patient was never recommended for systemic chemo would not and should not exclude him from hospice care as this is a palliative measure.  We've discussed that continued blood transfusions would not be recommended as these are considered life-sustaining blood products and not in accord with a person focusi

## 2019-06-17 NOTE — TELEPHONE ENCOUNTER
I returned Dipti Lawrence' message regarding questions he had regarding Delmer Barlow). Dipti Lawrence had many questions regarding hospice and his cancer staging.  Dipti Lawrence agrees with Pt's wife regarding giving [facility] Melinda a month to see how he is feeling and how fast ascites re-ac

## 2019-06-17 NOTE — TELEPHONE ENCOUNTER
I spoke with Bello Russo' wife Shelton Sherwood this morning. Oswaldo Campo wanted to give me and update that pt had a paracentesis while he was in ER on Thursday and >4L fluid was taken off.  Oswaldo Campo reports that [employer] much better and is breathing and eat

## 2019-06-17 NOTE — TELEPHONE ENCOUNTER
Pt's son Emmanuelle Elizalde is calling would like to speak to PVR in reg to surgery he had for Paracentesis per son unsure if hospice is the best option for pt. Son would like to speak to PVR.

## 2019-06-21 NOTE — TELEPHONE ENCOUNTER
Wife, on GUY, states she has been waking pt at 3:30 in AM as she leaves early for work. The past mornings, patient's blood sugars have been low, with the lowest being 38, and other sugars ranging in the 40's.  Wife states she feeds patient immediately and a

## 2019-06-22 NOTE — TELEPHONE ENCOUNTER
Informed pt's wife of Dr. Carlo Monk's advice. Wife states she's been holding Glimepiride and will call on Monday 6/24 with bs results.

## 2019-06-24 NOTE — TELEPHONE ENCOUNTER
Wife states no longer giving Glimipiride and only giving Metformin in the morning as he's been having low bs levels. (40's and 50's). With just Metformin in the morning the last two mornings the bs has been in the upper 80's.

## 2019-06-27 NOTE — PROGRESS NOTES
Palliative Care Follow Up Note     Patient Name: Beryle Session   YOB: 1940   Medical Record Number: [id number]   Date of visit: 6/26/19     Chief Complaint/Reason for Visit:  Patient presents with:  Divya Connelly \"Brian\" presents History of gastric ulcer     Macular edema due to secondary diabetes ([facility]     Squamous cell esophageal cancer ([facility]     Dysphagia     Dysphagia, unspecified type     Odynophagia     Acute esophageal obstruction     Diabetes mellitus with complication ([facility] [address] ENDOSCOPY   • LAPAROSCOPIC CHOLECYSTECTOMY  7/22/15   • NEEDLE BIOPSY LIVER  7/22/15   • REMOVAL OF KIDNEY STONE Left 1985    Open   • UPPER GI ENDOSCOPY,BIOPSY  4/16/15       Allergies:  No Known Allergies    Family History:  Family History   Problem Rela TABLET BY MOUTH TWICE A DAY Disp: 30 tablet Rfl: 3   METOPROLOL SUCCINATE ER 50 MG Oral Tablet 24 Hr TAKE 1 TABLET BY MOUTH EVERY DAY Disp: 90 tablet Rfl: 1   spironolactone 25 MG Oral Tab Take 1 tablet (25 mg total) by mouth once daily.  Disp: 90 tablet Rf ). Negative for cough, hemoptysis and wheezing. Cardiovascular: Negative for chest pain, palpitations, claudication and leg swelling.    Gastrointestinal: Negative for abdominal pain, blood in stool (Large amount of blood noted in bathroom and in toilet edema (+1 BLE non-pitting edema). Neurological: He is alert. oriented to name and place only  Answers questions appropriately  Wife reports improvement in mental status since starting Aricept and Zoloft   Skin: Skin is warm and dry.  He is not diaphoret with Dr. Mushtaq Campo today      Palliative Performance Scale 50%    Palliative Care Follow-up:  I spent a total of (P) 25 minutes with the patient today, which included all of the following:direct face to face contact, history taking, physical examination, and

## 2019-06-27 NOTE — PATIENT INSTRUCTIONS
-Please call Kailyn Benjamin with any Palliative Care concerns/questions @ [phone]    -Follow up in 1 month

## 2019-06-27 NOTE — PROGRESS NOTES
Cancer Center Progress Note    Patient Name: Isaac Starkey   YOB: 1940   Medical Record Number: [id number]   CSN: [id number]   Consulting Physician: Madeleine Barbosa MD  Referring Physician(s): Laney Postal  Date of Visit: 06/26/2019     M[place] it performed in 6/2018. His repeat procedure shows 2 areas concerning for invasive esophageal squamous cell carcinoma. Based on his more invasive disease involvement, pt was recommended for SBRT with Dr. Michael Scruggs.  He was undergoing radiation therapy and un • High cholesterol    • History of alcohol use    • History of tobacco use     quit in [address] (hard of hearing)    • Mumps    • Nephrolithiasis    • Problems with swallowing    • Thrombocytopenia (HCC)    • Visual impairment     glasses       Past       Spouse name: Not on file      Number of children: 5      Years of education: Not on file      Highest education level: Not on file    Occupational History      Occupation:     Social Needs      Financial resource strain: Not on file Self-Exams: Not Asked    Social History Narrative      \"Brian\" is  to wife, Yadira Dallas, x 30yrs. He has 5 adult children from a previous marriage. He is originally [place]. 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  He has been in the MC10 responses  HEENT: EOMs intact. Oropharynx is clear. Neck:  No palpable lymphadenopathy. Neck is supple. Lymphatics: There is no palpable lymphadenopathy throughout in the cervical, supraclavicular, axillary, or inguinal regions.   Chest: Clear to auscult carcinoma of mid esophagus -cTis initially, now pT1b, stage 1    --Patient is relatively asymptomatic currently  --Discussed with patient and his wife that this was carcinoma in situ which appears to be limited stage disease from what we can tell at this p the pt and family are continuing to pursue likely in the future. Pt is not interested in aggressive tx measures.     --we've also discussed goals of care indicating pt would like to be DNR/DNI/no tube feeds and [place] the [place]; scanned into Epi [address]  [address] [facility]

## 2019-07-10 NOTE — PROGRESS NOTES
Neurology follow-up visit     Referred By: Dr. Colindres ref. provider found    Chief Complaint: Patient presents with:  Memory Loss: Patient here with wife for follow up for memory loss. Wife reports patient is stable and not declining since LOV on 3/26/2019. increased, continued with Aricept and sertraline. No follow-up in July 2019 reported relatively stable cognition and memory. Relatively stable mood as well.     Past Medical History:   Diagnosis Date   • Back problem     pain   • Chicken pox    • Cirrho status: Former Smoker 2.00 Packs/day For 40.00 Years   Types: Cigarettes   Quit date: 1/1/1998   Smokeless tobacco: Never Used       Alcohol use No   Comment: former       Drug use: No       Family History   Problem Relation Age of Onset   • Cancer Mother Disp: 180 tablet, Rfl: 0  •  furosemide 40 MG Oral Tab, Take 1 tablet (40 mg total) by mouth daily. , Disp: 90 tablet, Rfl: 3  •  Donepezil HCl 10 MG Oral Tab, Take 1 tablet (10 mg total) by mouth nightly., Disp: 90 tablet, Rfl: 3  •  Sertraline HCl 25 MG O upper extremities 5/5 proximally and distally                  - lower  extremities 5/5 proximally and distally    Sensory Exam:  Light touch sensation- intact in all 4 extremities    Deep Tendon Reflexes:  Biceps 2+ bilateral symmetric  Triceps 2+ bilater [facility])           Education and counseling provided to patient. Instructed patient to call my office or seek medical attention immediately if symptoms worsen. Patient verbalized understanding of information given. All questions were answered.  All side effec

## 2019-07-10 NOTE — PROGRESS NOTES
Amaury Blanton is a 78year old male. HPI:   1. Type 2 diabetes mellitus without complication (HCC)    The patient has been taking all prescribed diabetic medications at home and has been following a diabetic diet.  Recent HgA1c was 12.4 Patient advised to fo TRIAMTERENE-HCTZ 37.5-25 MG Oral Cap TAKE 1 CAPSULE BY MOUTH EVERY DAY IN THE MORNING Disp: 90 capsule Rfl: 1   ACCU-CHEK MULTICLIX LANCETS Does not apply Misc Test blood sugar once daily Disp: 100 each Rfl: 3   Blood Glucose Monitoring Suppl (ACCU-CHEK alcohol use    • History of tobacco use     quit in [address] (hard of hearing)    • Mumps    • Nephrolithiasis    • Problems with swallowing    • Thrombocytopenia (HCC)    • Visual impairment     glasses      Social History:  Social History    Tobacco time they were done to next visit. D/C nataglinide. HgA1C reviewed again and very elevated. Blood sugars have mildly better lately. On only 1000 metformin daily currently.     2. Essential hypertension with goal blood pressure less than 130/85    Continue

## 2019-07-19 NOTE — TELEPHONE ENCOUNTER
Last rx on file for the metformin that was sent to pharmacy on 4/25/19 has 2 different directions. Encounter routed to provider to confirm the dose the patient is to be on. Order pended.

## 2019-07-25 NOTE — PROGRESS NOTES
Palliative Care Follow Up Note     Patient Name: Hubert Gowers   YOB: 1940   Medical Record Number: [id number]   Date of visit: 7/24/19     Chief Complaint/Reason for Visit:  Patient presents with:  Divya Connelly \"Brian\" presents ascites       Medical History:  Past Medical History:   Diagnosis Date   • Back problem     pain   • Chicken pox    • Cirrhosis ([facility] [address]    • Colon polyp    • [facility]    • Esophageal cancer (HCC)     current   • Esophageal varices ([facility]    • Exposure t tobacco related   • Other (Unknown cause) Brother 61   • Other (WWII) Father 46        Cause of death   • Other (Other) Maternal Grandmother    • Other (Other) Maternal Grandfather    • Other (Other) Paternal Grandmother    • Other (Other) Paternal Lolis Bleacher tablet (25 mg total) by mouth once daily.  Disp: 90 tablet Rfl: 1   TRIAMTERENE-HCTZ 37.5-25 MG Oral Cap TAKE 1 CAPSULE BY MOUTH EVERY DAY IN THE MORNING Disp: 90 capsule Rfl: 1   ACCU-CHEK MULTICLIX LANCETS Does not apply Misc Test blood sugar once daily D wife reports that patient coughs after drinking thin liquids    Patient is currently on soft food diet and tolerating well-no coughing after eating solid foods    Patient denies any dysphasia symptoms today    Abdominal distention worsening over the last f preference about sharing medical information: Patient and family may receive information  Patient's decision making preferences: Speak with family  Code status: DNR  Have advanced directives been discussed with patient or healthcare power of : Yes

## 2019-08-07 NOTE — TELEPHONE ENCOUNTER
The source prescription was discontinued on 11/24/2018 by Linsey Ceja MD for the following reason: Stop Taking at Discharge    Per HIPPA consent spoke with wife. Per wife no longer taking, however still on med list at office visit in July with Dr. Vikki Dutton, per wife blood sugars have been running about 100 in the AM and there was a discussion about decreasing the Metformin dosage which they have not had to do yet. Please advise if we can discontinue the  Glimepiride on the med list and send a denial to the pharmacy. Thank you.

## 2019-08-19 PROBLEM — H35.373 MACULAR PUCKER, BILATERAL: Status: ACTIVE | Noted: 2019-01-01

## 2019-08-19 NOTE — PATIENT INSTRUCTIONS
Diabetic retinopathy associated with controlled type 2 diabetes mellitus ([facility] [address]  I advised patient and his wife that he needs to go back to [facility] and Dr. Carl Torrez for his missed followup visit to monitor retina and ERM.  I gave office information

## 2019-08-19 NOTE — ASSESSMENT & PLAN NOTE
I advised patient and his wife that he needs to go back to [facility] and Dr. Jenna Cardona for his missed followup visit to monitor retina and ERM. I gave office information and she will call for an appointment.

## 2019-08-19 NOTE — PROGRESS NOTES
Yesi Gresham is a 78year old male. HPI:     HPI     Diabetic Eye Exam     Diabetes characteristics include Type 2, controlled with diet and taking oral medications. Duration of 6 years. Number of years diabetic 6. Number of years on pills 6.   Number • Cancer Mother 79        lung cancer; tobacco related   • Other (Unknown cause) Brother 61   • Other (WWII) Father 46        Cause of death   • Other (Other) Maternal Grandmother    • Other (Other) Maternal Grandfather    • Other (Other) Paternal Grandm TABLET (50 MG TOTAL) BY MOUTH DAILY. Disp: 90 tablet Rfl: 1   furosemide 40 MG Oral Tab Take 1 tablet (40 mg total) by mouth daily. Disp: 90 tablet Rfl: 3   Donepezil HCl 10 MG Oral Tab Take 1 tablet (10 mg total) by mouth nightly.  Disp: 90 tablet Rfl: 3 Fundus Exam       Right Left    Disc Normal Normal    C/D Ratio 0.2 0.2    Macula Normal No BDR  ERM ERM    Vessels Normal Normal    Periphery Normal Normal            Refraction     Wearing Rx     Type:  Forgot glasses          Manifest Refraction       S

## 2019-09-05 NOTE — PROGRESS NOTES
Palliative Care Follow Up Note     Patient Name: Zuleima Vazquez   YOB: 1940   Medical Record Number: [id number]   Date of visit: 9/5/19     Chief Complaint/Reason for Visit:  Patient presents with:  Divya Connelly \"Brian\" presents Dysphagia, unspecified type     Odynophagia     Acute esophageal obstruction     Diabetes mellitus with complication (HCC)     Systolic congestive heart failure (HCC)     Other ascites     Macular pucker, bilateral       Medical History:  Past Medical Hist STONE Left 1985    Open   • UPPER GI ENDOSCOPY,BIOPSY  4/16/15       Allergies:  No Known Allergies    Family History:  Family History   Problem Relation Age of Onset   • Cancer Mother 79        lung cancer; tobacco related   • Other (Unknown cause) Brothe mouth 2 (two) times daily before meals. 30 minutes before breakfast and dinner. Disp: 180 tablet Rfl: 1   metFORMIN HCl 1000 MG Oral Tab Take 1 tablet (1,000 mg total) by mouth 2 (two) times daily with meals.  Disp: 180 tablet Rfl: 1   METOPROLOL SUCCINATE pain, blood in stool, constipation (takes Colace daily), diarrhea, heartburn, melena, nausea and vomiting.         Patient is currently on soft food diet and tolerating well-no coughing after eating solid foods    Patient denies any dysphasia symptoms today Dr. Stone Hernandez for [place].      Palliative Care Goals of Care:  Discussed with patient/family today: Yes  Patient's preference about sharing medical information: Patient and family may receive information  Patient's decision making preferences: Speak with fa

## 2019-10-14 NOTE — TELEPHONE ENCOUNTER
With HIGH WARNING due to spironolactone, triamterene and Losartan. Refill passed per [facility] protocol.     Hypertensive Medications  Protocol Criteria:  · Appointment scheduled in the past 6 months or in the next 3 months  · BMP or CMP in the

## 2019-10-14 NOTE — TELEPHONE ENCOUNTER
Will approve, patient has apt with pvr 10/31 needs BMP before visit, please put in order and have pt get labs before hand

## 2019-10-15 NOTE — TELEPHONE ENCOUNTER
Patient's wife calling with requesting that lab orders be placed before appointment on 10/31/19. Informed wife that Matthews Schaumann, PA-C ordered a BMP yesterday.      Wife requesting that message be sent to Dr. Kvng Roche to make sure no additional labs are n

## 2019-10-23 NOTE — TELEPHONE ENCOUNTER
Wife called in stating that patient's stomach is very bloated and is requesting an order for paracentesis (previously done on 6/13/19). Patient scheduled to see Dr. Merline Rather on 10/31, but she isn't sure if Patient can wait til then.      Please advise

## 2019-10-23 NOTE — TELEPHONE ENCOUNTER
Spouse returned call, reports patient is currently receiving palliative care with DHEERAJ ROGERS. It was recommended if patient's symptoms worsen to request an order for paracentesis from PCP.  Spouse reports symptoms of abdominal distension worsening, pt is

## 2019-10-23 NOTE — TELEPHONE ENCOUNTER
Followed up with spouse, reports the last order he had for paracentesis was from the ED. Is aware Dr Yessy Adams will be out to Monday, was not recommended to wait since patient's symptoms worse and not eating, spouse agreed to take patient to the ED.

## 2019-10-23 NOTE — TELEPHONE ENCOUNTER
Left message to call back for wife (patient's # is same) to discuss paracentesis order request and to take patient to ER if symptoms worsen prior to speaking with triage. GERALDINE Lehman, please see below and advise    Please reply to susanne: FANTASMA Garcia

## 2019-10-25 NOTE — TELEPHONE ENCOUNTER
Spoke to Dr. Beulah Caba in person and would like to see patient at 4 PM.  Checked with Nicole Wilder RN and also got the okay. Pt's wife David gaffney (on GUY) contacted and informed her about Monday's appt 10/28/19 at 4pm, advised to arrive 15 mins early.      Needs to

## 2019-10-25 NOTE — TELEPHONE ENCOUNTER
Patient's spouse returned call. They did not present to the ER stating \"symptoms are not that bad. \" Asking if they can have a standing order for a paracentesis in the event symptoms do worsen so they can avoid the ER again where last paracentesis was don

## 2019-10-25 NOTE — TELEPHONE ENCOUNTER
LOV 37/70/21 Alcoholic Cirrhosis of the Liver, esophageal cancer    Pt is experiencing SOB    Experiencing pain in his abdomen due to bloating.     Pt's wife is stating he has gain 8 pounds since June    He is not eating well    He has had these symptoms fo

## 2019-10-25 NOTE — TELEPHONE ENCOUNTER
RN to have the pt see me on Monday afternoon, may need paracentesis. Discussed with the patient's wife Estefani Villatoro down in endoscopy today. Patient's been experiencing weight loss and now has ascites and abdominal distention with poor appetite.   I discusse

## 2019-10-28 NOTE — TELEPHONE ENCOUNTER
I contacted Rosibel Garcia at [facility] [phone] and was transferred to [phone] and spoke to [facility]. Gave NPI/tax ID of  and emshanell, CPT and diag codes, date of service.     ID # [id number]  CPT [id number] US guided paracentesis  Diag: K70.31 ascites due to alcoh

## 2019-10-28 NOTE — TELEPHONE ENCOUNTER
Patient was seen today by Dr. Amanda Mcintyre and ordered paracentesis. CA, RN spoke to Gaby Fields at [employer] and gave an arrival time of 0800 tomorrow 10/28/19 and to register at [place] [address].   Yadira Dallas, wife, was notified about arrival time and where to go while susanl

## 2019-10-28 NOTE — TELEPHONE ENCOUNTER
Dr. Mireya SIMMS    Pt is scheduled today at [address] [facility] per our conversation Friday. Trinity Kumar RN ok the appt as well. Pt's wife notified on Friday about this appointment. Thank you.

## 2019-10-28 NOTE — PROGRESS NOTES
Sergioml Session is a 78year old male. HPI:   Patient presents with: Follow - Up    The patient is an 78-year-old male who has a history of esophageal cancer, cirrhosis with ascites who presents with worsening abdominal distention/ascites.   Reports that hi MD at [address] [place] [address] ENDOSCOPY   • ESOPHAGOGASTRODUODENOSCOPY (EGD) N/A 10/11/2018    Performed by Margarita Holstein, MD at [address] [place] [address] ENDOSCOPY   • ESOPHAGOGASTRODUODENOSCOPY (EGD) N/A 8/15/2018    Performed by Isiah Nash MD at [address] [place] [address] times daily with meals. , Disp: 180 tablet, Rfl: 1  METOPROLOL SUCCINATE ER 50 MG Oral Tablet 24 Hr, TAKE 1 TABLET BY MOUTH EVERY DAY, Disp: 90 tablet, Rfl: 1  spironolactone 25 MG Oral Tab, Take 1 tablet (25 mg total) by mouth once daily. , Disp: 90 tablet, alcoholic cirrhosis (hcc)  (primary encounter diagnosis)  Cirrhosis   Esophageal cancer     The patient is a 52-year-old with a history of esophageal cancer, alcohol induced cirrhosis with ascites.   He reports reaccumulation of ascites and distention with

## 2019-10-28 NOTE — PATIENT INSTRUCTIONS
Cirrhosis with ascites  - paracentesis  - continue low sodium diet  - lasix 40mg a day and will check on adding aldactone  - limit fluid intake  - liver ultrasound + doppler  - lab work and AFP    Call when fluid re accumulating.      See me back in office

## 2019-10-29 NOTE — ED PROVIDER NOTES
Patient Seen in: [facility] Immediate Care In [address] [place] [address]    History   Patient presents with:  Wound    Stated Complaint: wound check     HPI    Patient complains of seepage from paracentesis drainage.   Patient had paracentesis done today patient has Lennox Riggers, MD at [facility] ENDOSCOPY   • ESOPHAGOGASTRODUODENOSCOPY (EGD) N/A 8/15/2018    Performed by Emmy Ward MD at [facility] ENDOSCOPY   • ESOPHAGOGASTRODUODENOSCOPY (EGD) N/A 6/21/2018    Performed by Lennox Riggers, MD at [facility] ENDOSCOPY   • [facility] • Cancer Mother 79        lung cancer; tobacco related   • Other (Unknown cause) Brother 61   • Other (WWII) Father 46        Cause of death   • Other (Other) Maternal Grandmother    • Other (Other) Maternal Grandfather    • Other (Other) Paternal Grandm further follow-up  EXTREMITIES: from, 5/5 strength in all 4 ext, no edema  NEURO: alert and oiented *3, 2-12 intact, no focal deficit noted  SKIN: good skin turgor, no  rashes  PSYCH: calm, cooperative,    Differential includes:    ED Course   Labs Reviewe

## 2019-10-29 NOTE — IMAGING NOTE
0840 Pt to ultrasound room scouts taken by [address] [email or web address] [facility] 115 Hx taken procedure explained questions answered     1057 Patient consented. Pt to get albumin 25% 25 grams yes      0909 PATEL TARANGO  Here scanning completed .   PLATELETS = 183.0

## 2019-10-29 NOTE — ED INITIAL ASSESSMENT (HPI)
Pt had paracentesis done today. Pt pulled the bandage off and had bleeding to the area. Pt was not to pull off the bandage for 5 days.

## 2019-10-30 NOTE — TELEPHONE ENCOUNTER
Dr Pushpa Sierra, I had left message for Char Kamara, but spoke to David gaffney, spouse. Char Kamara contacted her, she is on her way to  pt and take to IR now. I gave your message. Thanks. May close encounter when done.

## 2019-10-30 NOTE — ED PROVIDER NOTES
Patient Seen in: [facility] Emergency Department      History   Patient presents with:  Postop/Procedure Problem    Stated Complaint: pain s/p paracentesis    HPI    The patient is an 55-year-old male with a history of hepatitis, esophageal varice Performed by Sandy Wallace MD at [facility] ENDOSCOPY   • ESOPHAGOGASTRODUODENOSCOPY (EGD) N/A 8/15/2018    Performed by Loraine Ratliff MD at [facility] ENDOSCOPY   • ESOPHAGOGASTRODUODENOSCOPY (EGD) N/A 6/21/2018    Performed by Sandy Wallace MD at [address] Rate and Rhythm: Normal rate and regular rhythm. Heart sounds: Normal heart sounds. No murmur. Pulmonary:      Effort: Pulmonary effort is normal.      Breath sounds: Normal breath sounds.    Abdominal:      General: Bowel sounds are normal. There [place]ra Damon [address]  [phone]    Schedule an appointment as soon as possible for a visit      We recommend that you schedule follow up care with a primary care provider within the next three months to obtain basic health screening including reassessment

## 2019-10-30 NOTE — TELEPHONE ENCOUNTER
Son asking for update on pts visit with PL on Monday - he was sent to hosp for paracentisis and then ER yesterday

## 2019-10-30 NOTE — PROGRESS NOTES
Paracentesis performed on 10/28. Patient has had ascites leaking from abd puncture site. Assessment/Plan:  Right lower abd ascites puncture site oozing. Dermabond applied x2, Tip Stop applied, no further leaking.     Thank you for allowing me to pa

## 2019-10-30 NOTE — ED NOTES
Pt arrives with drainage from paracentesis site. Pt had 9L drained from the abdomen today, pt is scheduled for monthly drains. Wife states they went to the IC but were sent here for continued drainage.  Area around the drainage site is red, warm, wife state

## 2019-10-30 NOTE — TELEPHONE ENCOUNTER
I contacted the patient's wife, the patient was brought back to the hospital and in interventional radiology a glue was placed and this has stopped the leaking from the prior paracentesis site.   The patient's wife reports he has no pain, he is breathing be

## 2019-10-30 NOTE — TELEPHONE ENCOUNTER
The patient's son Lyssa Oliva, who is on HIPPA,  calling to speak with Dr. Shaq Cool about the paracentesis done yesterday that the son stated Dr. Shaq Cool scheduled. Looking through the notes, the patient was seen by Dr. Kalia England who scheduled the paracentesis.

## 2019-10-30 NOTE — TELEPHONE ENCOUNTER
DEMI Blas calling son, I contacted spouse. Micaela Coelho states pt was having large amount of drainage from paracentesis. They went to urgent care last night, sent to ER, who mentioned possibility of placing drain.  She left message for Joselito Peguero in int

## 2019-10-30 NOTE — ED NOTES
Room empty, pt and wife left without consulting ED staff. CNL states she saw wife and patient ambulating out of ED with steady gait.

## 2019-10-30 NOTE — TELEPHONE ENCOUNTER
Drainage can occur from the paracentesis site after a tap, please have them keep a bandage on the area and contact IR to see if they have additional recommendations. Call with an update. In most cases this slows down /stops on its own.

## 2019-10-31 PROBLEM — Z23 NEED FOR VACCINATION: Status: ACTIVE | Noted: 2019-01-01

## 2019-10-31 NOTE — PROGRESS NOTES
Amaury Blanton is a 78year old male. HPI:   1. Type 2 diabetes mellitus without complication (HCC)    The patient has been taking all prescribed diabetic medications at home and has been following a diabetic diet.  Recent HgA1c was 12.4 Patient advised to fo mg by mouth daily.  Take one tablet in th morning ), Disp: 180 tablet, Rfl: 1  METOPROLOL SUCCINATE ER 50 MG Oral Tablet 24 Hr, TAKE 1 TABLET BY MOUTH EVERY DAY, Disp: 90 tablet, Rfl: 1  spironolactone 25 MG Oral Tab, Take 1 tablet (25 mg total) by mouth on History    Tobacco Use      Smoking status: Former Smoker        Packs/day: 2.00        Years: 40.00        Pack years: [de-identified]        Types: Cigarettes        Quit date: 1998        Years since quittin.8      Smokeless tobacco: Never Used    Alcohol u less than 130/85    Continue LOSARTAN 50 MG DAILY BECAUSE OF COUGH as another anti-hypertensive medicines exactly as prescribed and to follow a low salt diet as discussed.  Regular exercise at least 3 times weekly will help to curb one's appetite, control w

## 2019-11-04 PROBLEM — L03.90 CELLULITIS, UNSPECIFIED CELLULITIS SITE: Status: ACTIVE | Noted: 2019-01-01

## 2019-11-04 PROBLEM — L03.90 CELLULITIS: Status: ACTIVE | Noted: 2019-01-01

## 2019-11-04 NOTE — ED NOTES
Orders for admission, patient is aware of plan and ready to go upstairs. Any questions, please call ED RN Liliana Jerry  at extension [phone].

## 2019-11-04 NOTE — H&P
[facility]    PATIENT'S NAME: Katia Victor   ATTENDING PHYSICIAN: Supriya Mendez MD   PATIENT ACCOUNT#:   [id number]    LOCATION:  [place] [address]  MEDICAL RECORD #:   [id number]       YOB: 1940  ADMISSION DATE:       11/04/2019 OF SYSTEMS:  Erythema of the abdominal wall has been progressive for the last 3 to 4 days. No fever or chills. No chest pain. No abdominal pain. Other 12-point review of systems negative.       PHYSICAL EXAMINATION:    GENERAL:  Alert, oriented, no acut

## 2019-11-04 NOTE — ED NOTES
Patient arrives with complaints of abdominal redness and pain that began this morning. Patient states he had a paracentesis one week ago with no complications. Denies fever.  Denies n/v/d.

## 2019-11-04 NOTE — PROGRESS NOTES
Patient had large volume paracentesis on 10/28. The puncture site was leaking, thus on 10/30 he came to IR clinic for site check. Dermabond was applied to site which resulted in no further leaking. He presents today with his wife.   His lower abdome

## 2019-11-05 NOTE — PROGRESS NOTES
[facility] Dosing Service    Initial Pharmacokinetic Consult for Vancomycin Dosing     Isaac Starkey is a 78year old male who is being treated for cellulitis. Pharmacy has been asked to dose Vancomycin by Dr. Johnson Baptism    He has No Known Allergies.       V

## 2019-11-05 NOTE — PLAN OF CARE
Problem: Patient Centered Care  Goal: Patient preferences are identified and integrated in the patient's plan of care  Description  Interventions:  - What would you like us to know as we care for you?  \" I'm going home tonight\"  - Provide timely, comple to remain within acceptable range    Interventions:   - Perform accuchecks achs and prn  -  Encourage oral intake  - medications as ordered  - See additional Care Plan goals for specific interventions   11/5/2019 0438 by Joseline Hanna RN  Outcome: Delorise Sessions ability and stability  - Promote increasing activity/tolerance for mobility and gait  - Educate and engage patient/family in tolerated activity level and precautions     Outcome: Progressing     Problem: Impaired Cognition  Goal: Patient will exhibit impro

## 2019-11-05 NOTE — DIETARY NOTE
ADULT NUTRITION INITIAL ASSESSMENT    Pt is at moderate nutrition risk. Pt does not meet malnutrition criteria.       RECOMMENDATIONS TO MD:  See Nutrition Intervention     NUTRITION DIAGNOSIS/PROBLEM:  Inadequate protein energy intake related to increased radiation    • Hearing impairment     [place], doesn't wear aids   • Hearing loss    • Hepatitis    • Hepatosplenomegaly    • High blood pressure    • High cholesterol    • History of alcohol use    • History of tobacco use     quit in [address] (hard of he CREATSERUM 0.78 0.61*   CA 8.9 8.0*    139   K 4.4 4.2    105   CO2 28.0 29.0   OSMOCALC 286 288       NUTRITION RELATED PHYSICAL FINDINGS:  - Body Fat/Muscle Mass: well nourished per visual exam.  - Fluid Accumulation: +2 R foot per RN docum

## 2019-11-05 NOTE — PLAN OF CARE
Problem: Patient Centered Care  Goal: Patient preferences are identified and integrated in the patient's plan of care  Description  Interventions:  - What would you like us to know as we care for you?  \" I'm going home tonight\"  - Provide timely, comple Administer ordered medications to maintain glucose within target range  - Assess barriers to adequate nutritional intake and initiate nutrition consult as needed  - Instruct patient on self management of diabetes  Outcome: Progressing     Problem: SKIN/TIS safety including physical limitations  - Instruct pt to call for assistance with activity based on assessment  - Modify environment to reduce risk of injury  - Provide assistive devices as appropriate  - Consider OT/PT consult to assist with strengthening/

## 2019-11-05 NOTE — PLAN OF CARE
Dr. Lozano Come notified this morning BP is 97/51, HR:66, afebrile(last night BP was:121/58,  HR:58)--no orders at this time-if BP  drops in the 32\"S systolic, will consider Albumin.

## 2019-11-05 NOTE — ED PROVIDER NOTES
Patient Seen in: [facility] Emergency Department      History   Patient presents with:  Cellulitis (integumentary, infectious)    Stated Complaint: cellulitis    HPI    42-year-old male presents for evaluation of possible cellulitis.   Patient with Ariane Ferraro MD at [address] [place] [address] ENDOSCOPY   • LAPAROSCOPIC CHOLECYSTECTOMY  7/22/15   • NEEDLE BIOPSY LIVER  7/22/15   • REMOVAL OF KIDNEY STONE Left 1985    Open   • UPPER GI ENDOSCOPY,BIOPSY  4/16/15                    Social History    Tobacco Use      Smoking sta Skin is warm and dry. Findings: No rash. Neurological:      Mental Status: He is alert and oriented to person, place, and time.       Comments: No focal deficits               ED Course     Labs Reviewed   COMP METABOLIC PANEL (14) - Abnormal; Notabl the next three months to obtain basic health screening including reassessment of your blood pressure.     Medications Prescribed:  Current Discharge Medication List                   Present on Admission  Date Reviewed: 10/31/2019          ICD-10-CM Noted P

## 2019-11-05 NOTE — PLAN OF CARE
Problem: Patient Centered Care  Goal: Patient preferences are identified and integrated in the patient's plan of care  Description  Interventions:  - What would you like us to know as we care for you?  \" My  has dementia\"  - Provide timely, compl

## 2019-11-05 NOTE — PAYOR COMM NOTE
--------------  ADMISSION REVIEW     Payor: Narayan REYES  Subscriber #:  [id number]  Authorization Number: [id number]    Admit date: 11/4/19  Admit time: 5202       Admitting Physician: Yesenia Oreilly MD  Attending Physician:  Duran Sorto, Performed by Nobie Brittle, MD at [address] [place] [address] ENDOSCOPY   • ESOPHAGOGASTRODUODENOSCOPY (EGD) N/A 12/6/2018    Performed by Nobie Brittle, MD at [address] [place] [address] ENDOSCOPY   • ESOPHAGOGASTRODUODENOSCOPY (EGD) N/A 11/23/2018    Performed by Hanh Otoole MD at [address] [place] [address] ENDOSCOP Normal range of motion. Skin:     General: Skin is warm and dry. Findings: No rash. Neurological:      Mental Status: He is alert and oriented to person, place, and time.       Comments: No focal deficits               ED Course     Labs Reviewed with a primary care provider within the next three months to obtain basic health screening including reassessment of your blood pressure.     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 The patient has also history of dementia, possible multiinfarct type.     PAST S bilateral groin fungal dermatitis. 2.   Liver cirrhosis. 3.   Mild diabetes mellitus type 2.  4.   Chronic anemia, stable hemoglobin, and mild thrombocytopenia. The patient will be admitted to general medical floor. IV vancomycin.   Miconazole powder Route User    11/5/2019 5561 Given 40 mg Oral Joseline Hanna RN      Sertraline HCl (ZOLOFT) tab 25 mg     Date Action Dose Route User    11/5/2019 0114 Given 25 mg Oral Joseline Hanna RN      Vancomycin HCl (VANCOCIN) 2,000 mg in sodium chloride 0.9% 500

## 2019-11-05 NOTE — PROGRESS NOTES
[facility]    Progress Note    Marcelloravi Olivia Patient Status:  Inpatient    1940 MRN [id number]   Location [facility] 5SW/SE Attending Nelly Sethi MD   Hosp Day # 1 PCP Yehuda Soulier, MD       Subjective:     Pt fatoui plts.    Hx of chronic anemia, stable hemoglobin, and mild thrombocytopenia.   - monitor CBC    Hx of HL  - cont lipitor    Dementia - cont aricept    Psych - cont zoloft    dvt proph:   heparin    Code status:   DNR    >35 minutes spent     [employer]

## 2019-11-06 NOTE — DISCHARGE SUMMARY
[facility]    Discharge Summary    Randa Early Patient Status:  Inpatient    1940 MRN [id number]   Location [facility] 5SW/SE Attending No att. providers found   [address] Day # 2 PCP Onelia García MD     Date of Admissio ([facility] [address]     Systolic congestive heart failure (HCC)     Other ascites     Macular pucker, bilateral     Need for vaccination     Cellulitis     Cellulitis, unspecified cellulitis site      Physical Exam:      /62 (BP Location: Left arm)   Pulse 80   Te and mild thrombocytopenia.   Stable.     Hx of HL  - cont lipitor     Dementia - cont aricept     Psych - cont zoloft     dvt proph:   heparin     Code status:   DNR    Consultations:   None     Discharge Condition:  Good    Discharge Medications:      Disc total) by mouth nightly. Quantity:  90 tablet  Refills:  3     Ferrous Sulfate 324 (65 Fe) MG Tbec      TAKE 1 TABLET BY MOUTH TWICE A DAY   Quantity:  60 tablet  Refills:  1     Melatonin 5 MG Tabs      Take 5 mg by mouth nightly as needed.    Refills: 79  59-90 High Risk  29-58 Medium Risk  0-28   Low Risk. TCM Follow-Up Recommendation:  LACE > 58: High Risk of readmission after discharge from the hospital.      >35 minutes spent preparing this discharge.     Slade Pineda  11/6/2019  1:44 PM

## 2019-11-06 NOTE — PLAN OF CARE
Problem: Patient Centered Care  Goal: Patient preferences are identified and integrated in the patient's plan of care  Description  Interventions:  - What would you like us to know as we care for you?  \" I'm going home tonight\"  - Provide timely, comple symptoms of hyperglycemia and hypoglycemia  - Administer ordered medications to maintain glucose within target range  - Assess barriers to adequate nutritional intake and initiate nutrition consult as needed  - Instruct patient on self management of diabet of falls.   - Riley fall precautions as indicated by assessment.  - Educate pt/family on patient safety including physical limitations  - Instruct pt to call for assistance with activity based on assessment  - Modify environment to reduce risk of injury

## 2019-11-06 NOTE — TELEPHONE ENCOUNTER
patient wife is calling to request a appointment to get  in to see  next week from the instruction from the Hospital        Patient was seen in [address] [place] [address]     Please advise

## 2019-11-07 NOTE — PROGRESS NOTES
Initial Post Discharge Follow Up   Discharge Date: 11/6/19  Contact Date: 11/7/2019    Consent Verification:  Assessment Completed With: Spouse: Fransisco Webster received per patient?  written  HIPAA Verified?   Yes    Discharge Dx:   Cellulitis, uns total) by mouth 2 (two) times daily before meals. 30 minutes before breakfast and dinner. 180 tablet 1   • metFORMIN HCl 1000 MG Oral Tab Take 1 tablet (1,000 mg total) by mouth 2 (two) times daily with meals.  (Patient taking differently: Take 1,000 mg by addressed before your next visit with your PCP?  (DME, meds, disease concerns, Etc): No     Follow up appointments:      Your appointments     Date & Time Appointment Department [facility])    Nov 14, 2019  9:00 AM CST Hematology Oncology [facility] En 1 had any n/v/c/d or any new or worsening symptoms. Med review completed. NCM instructed her to keep blood pressure log and bring to PCP appt. She verbalized understanding and stated she would purchase a blood pressure monitor today and do so.  His blood suga

## 2019-11-07 NOTE — PAYOR COMM NOTE
--------------  DISCHARGE REVIEW    Payor: Leon REYES  Subscriber #:  [id number]  Authorization Number: [id number]    Admit date: 11/4/19  Admit time:  [address] [place]  Discharge Date: 11/6/2019 12:47 PM     Admitting Physician: Karishma Robbins MD  [employer]

## 2019-11-14 NOTE — PROGRESS NOTES
HPI:    Amaury Blanton is a 78year old male here today for hospital follow up.    He was discharged from Inpatient hospital, [facility]  to Home   Admission Date: 11/4/19   Discharge Date: 11/6/19  Hospital Discharge Diagnoses (since 10/15/2019) daily.  ascorbic acid 500 MG Oral Tab, Take 500 mg by mouth 2 (two) times daily.   FERROUS SULFATE 324 (65 Fe) MG Oral Tab EC, TAKE 1 TABLET BY MOUTH TWICE A DAY  Pantoprazole Sodium 40 MG Oral Tab EC, Take 1 tablet (40 mg total) by mouth 2 (two) times edwin (7/22/15); Cataract extraction (Bilateral, 2013); and colonoscopy (N/A, 5/24/2018). He family history includes Cancer (age of onset: 79) in his mother;  Other in his maternal grandfather, maternal grandmother, paternal grandfather, and paternal grandmoth bruits  CHEST: no chest tenderness  LUNGS: clear to auscultation  CARDIO: RRR without murmur  GI: good BS's, no masses, HSM or tenderness  MUSCULOSKELETAL: back is not tender, FROM of the extremities  EXTREMITIES: no cyanosis, clubbing or edema  NEURO: [place]

## 2019-11-14 NOTE — TELEPHONE ENCOUNTER
The patients wife Janae Ortiz contacted me, the pt has recurrent ascites, gained 18 lbs over past 2 weeks. Requesting repeat paracentesis.      Plan  - paracentesis with alb  - send fluid for analysis  - continue low sodium diet and fluid restriction  - diure

## 2019-11-14 NOTE — TELEPHONE ENCOUNTER
Contacted IR and was transferred to [facility]. Per [facility] she does the procedures but not scheduling. She then called [facility] while RN is on the phone and states pt is not scheduled yet. Per [facility] she will contact the patient to schedule.     Pt's wif

## 2019-11-14 NOTE — TELEPHONE ENCOUNTER
Pt scheduled for paracentesis and ultrasound as below.     Future Appointments   Date Time Provider Magdalena Dodge   11/15/2019 10:00 AM [address] [facility] [place] [address]   11/23/2019  7:30 AM [address] [place] [address]

## 2019-11-14 NOTE — PROGRESS NOTES
Palliative Care Follow Up Note     Patient Name: Leonardo Dominique   YOB: 1940   Medical Record Number: [id number]   Date of visit: 11/14/19     Chief Complaint/Reason for Visit:  Patient presents with:  Divya Connelly \"Brian\" present Dania Nephuna will be needed a paracentesis most likely;y today or tomorrow, if possible. Doug Benavidez was going up to [facility] (she also works there) to get an paracentesis order from Dr. Jacquie Wells.  I offered to call, but Doug Benavidez said she will be able to find Dr. Jacquie Wells u • Cirrhosis ([facility]    • Colon polyp    • Diabetes ([facility]    • Esophageal cancer [facility])     current   • Esophageal varices ([facility]    • Exposure to medical diagnostic radiation    • Hearing impairment     [place], doesn't wear aids   • Hearing loss    • Hepatitis (Other) Maternal Grandmother    • Other (Other) Maternal Grandfather    • Other (Other) Paternal Grandmother    • Other (Other) Paternal Grandfather    • Suicide History Brother 37   • Diabetes Neg    • Glaucoma Neg    • Clotting Disorder Neg        Social by mouth 2 (two) times daily before meals. 30 minutes before breakfast and dinner. 180 tablet 1   • metFORMIN HCl 1000 MG Oral Tab Take 1 tablet (1,000 mg total) by mouth 2 (two) times daily with meals.  180 tablet 1   • spironolactone 25 MG Oral Tab Take 1 Negative for back pain, falls, joint pain and neck pain. Skin: Negative. Neurological: Negative for dizziness, weakness and headaches. Psychiatric/Behavioral: Positive for memory loss (Memory loss per wife). Negative for depression.  The patient is n : Yes  Advance Directive: Copy on chart  Type of Healthcare Directive: Durable power of  for health care; Health care treatment directive  Healthcare Agent Appointed: Yes  Healthcare Agent's Name: Naheedhillary Ro (wife)  Healthcare Agent's P

## 2019-11-15 NOTE — IMAGING NOTE
1030 Pt to ultrasound room scouts taken by [address] [place] [address], [address] [place] [address]. Hx taken procedure explained questions answered     [phone] Patient consented. Pt to get albumin 25% 25 grams yes      [address], APN  Here scanning completed .   PLATELETS =  89.1

## 2019-11-18 NOTE — ED PROVIDER NOTES
Patient Seen in: [facility] Emergency Department      History   Patient presents with:  Abdomen/Flank Pain (GI/)    Stated Complaint:     HPI    Patient is an 41-year-old male with a history of esophageal cancer esophageal varices and liver cirr time. Appears well-developed and well-nourished. HEENT:   Head: Normocephalic and atraumatic.    Right Ear: External ear normal.   Left Ear: External ear normal.   Nose: Nose normal.   Mouth/Throat: Oropharynx is clear and moist.   Eyes: Conjunctivae and Abnormality         Status                     ---------                               -----------         ------                     CBC W/ DIFFERENTIAL[[id number]]          Abnormal            Final result                 Please vie

## 2019-11-18 NOTE — PLAN OF CARE
Admitted to room 453. Paracentesis done today, albumin given per protocol. Significant abdominal ascites noted. Right hip healing ulcer. Oriented pt and wife to room and fall precautions. Forgetful at times.  Bed in lowest position, call light in reach, fal

## 2019-11-18 NOTE — H&P
[facility]    PATIENT'S NAME: Pauly Tompkins   ATTENDING PHYSICIAN: Mary Moralez MD   PATIENT ACCOUNT#:   [id number]    LOCATION:  [place] [address]  MEDICAL RECORD #:   [id number]       YOB: 1940  ADMISSION DATE:       11/18/2019 fluid very quickly, and his abdominal girth increased rapidly in the last 3 days. He seems a little bit on and off with confusion, but overall, at baseline, he has moderate abdominal discomfort. No fever or chills.   Other 12-point review of systems negat

## 2019-11-18 NOTE — CONSULTS
[facility]   Gastroenterology Consultation Note    Jackie Magallanes  Patient Status:    Inpatient  Date of Admission:         11/18/2019, Hospital day #0  Attending:   Harpreet Mckeon MD  PCP:     Pedro Melendrez MD    Reason for Cons current   • Esophageal varices (HCC)    • Exposure to medical diagnostic radiation    • Hearing impairment     [place], doesn't wear aids   • Hearing loss    • Hepatitis    • Hepatosplenomegaly    • High blood pressure    • High cholesterol    • History of History Brother 37   • Diabetes Neg    • Glaucoma Neg    • Clotting Disorder Neg       reports that he quit smoking about 21 years ago. His smoking use included cigarettes. He has a 80.00 pack-year smoking history.  He has never used smokeless tobacco. He r non-distended, no abnormal bowel sounds noted, no masses are palpated  Back: No CVA tenderness   Skin: dry, warm, no jaundice  Ext: no cyanosis, clubbing or edema is evident.    Neuro: no asterixis      Laboratory Data:  Lab Results   Component Value Date paracentesis within a few weeks. Now third time in 2 months    Recommend:  - MELD with recent values 11, may benefit from TIPs as it will improve muscle mass and decrease the need for paracentesis. Also no hx of encephalopathy.  Will discuss with Dr. Natalie Ames

## 2019-11-18 NOTE — IMAGING NOTE
1340 Pt to ultrasound room scouts taken by [facility]    1340 Hx taken procedure explained questions answered     [phone] Patient consented. Pt to get albumin 25% 25 grams yes      Maegan SWEENEY  Here scanning completed .   PLATELETS =   720   PT=   15.3  INR= 1.

## 2019-11-18 NOTE — ED INITIAL ASSESSMENT (HPI)
Pt to ED c/o abdominal pain and bloating. Per medics patient was here for paracentesis 4 days ago and had 10L drained. Symptoms have since returned.

## 2019-11-19 NOTE — CM/SW NOTE
MD order received regarding hospice eval.  Referral sent to [facility] via [employer]. [facility] to meet with the pt. And family.       Minus Chong, [facility] ext [phone]

## 2019-11-19 NOTE — PLAN OF CARE
Pt confused and forgetful overnight. Attempted to get out of bed x2. Bed alarm on. Denies pain. Saline locked. Pt using urinal and bedpan. R hip ulcer healed and covered with mepilex. Wife at bedside helping the pt throughout the night.      Problem: Patien monitor pain and request assistance  - Assess pain using appropriate pain scale  - Administer analgesics based on type and severity of pain and evaluate response  - Implement non-pharmacological measures as appropriate and evaluate response  - Consider cul patient needs post-hospital services based on physician/LIP order or complex needs related to functional status, cognitive ability or social support system  Outcome: Progressing

## 2019-11-19 NOTE — HOSPICE RN NOTE
[facility] met with spouse and we will follow up tomorrow to see if the Paracentesis will be done for sure. She is in agreement with Hospice. POC was discussed with Ed Galo RN  Will follow up tomorrow.

## 2019-11-19 NOTE — PROGRESS NOTES
[facility]    Progress Note    Misty Magaña Patient Status:  Inpatient    1940 MRN [id number]   Location [facility] 4W/SW/SE Attending Chinmay Weeks MD   Hosp Day # 1 PCP Leonce Seip, MD       Subjective:   Misty Magaña sulfate, dextrose, Glucose-Vitamin C, glucose    Results:     Lab Results   Component Value Date    WBC 4.1 11/19/2019    HGB 8.4 (L) 11/19/2019    HCT 25.6 (L) 11/19/2019    .0 (L) 11/19/2019    CREATSERUM 0.73 11/19/2019    BUN 18 11/19/2019    NA discussed above. .  Correlate clinically. Status post cholecystectomy without biliary obstruction.      Dictated by (CST): Sharif Murry MD on 11/18/2019 at 16:10     Approved by (CST): Sharif Murry MD on 11/18/2019 at 16:25          Us Paracentesis Abdo

## 2019-11-19 NOTE — PROGRESS NOTES
[facility]     Gastroenterology Progress Note    Misty Magaña Patient Status:  Inpatient    1940 MRN [id number]   Location [facility] 4W/SW/SE Attending Chinmay Weeks MD   Hosp Day # 1 PCP Leonce Seip, MD values 11, may benefit from TIPs as it will improve muscle mass and decrease the need for paracentesis.  Also no hx of encephalopathy) vs pleurex if going hospice but increased risk of dehydration and infection  - Family leaning towards hospice and pleurex detailed above.    Supervising Physician: Yu Cain MD.    Dictated by (CST): Erasto Iglesias on 11/18/2019 at 14:49     Approved by (CST): Erasto Iglesias on 11/18/2019 at 14:51          Xr Chest Ap Portable  (cpt=71045)    Result Date: 11/18/2019

## 2019-11-19 NOTE — CM/SW NOTE
11/19/19 1000   CM/SW Referral Data   Referral Source    Reason for Referral Readmission   Informant Other  ([address] [place] [address] nursing rounds and chart review.)     Upon chart review pt has been readmitted to [address] [place] [address] and referral has been made to [facility]

## 2019-11-19 NOTE — CM/SW NOTE
MSW met with pt, spouse to present hospice information. The spouse is interested in having a plurex  Drain put in. [facility] team will FU with spouse in pt [de-identified] room tomorrow 11/20/19, in the afternoon.   TALYA Smith  [facility]  [phone]

## 2019-11-19 NOTE — PLAN OF CARE
Ambulating with assist with rolling walker, can be unsteady. Denies pain. Not impulsive this shift, but is more forgetful/impulsive overnight. Wife at bedside assisting with pt care.  Hospice meeting today, plan for diagnostic paracentesis tomorrow and like

## 2019-11-20 NOTE — PROGRESS NOTES
[facility]     Gastroenterology Progress Note    Hortencia Hester Patient Status:  Inpatient    1940 MRN [id number]   Location [facility] 4W/SW/SE Attending Xiomara Bourgeois MD   Hosp Day # 2 PCP Yamilet Abrams MD and decrease the need for paracentesis. Also no hx of encephalopathy) vs pleurex if going hospice but increased risk of dehydration and infection  - Family leaning towards hospice and pleurex at this time. Already discussed with hospice.  Plan for tomorrow at 16:25          Us Paracentesis Abdomen W Imaging  (NVK=[id number])    Result Date: 11/20/2019  CONCLUSION: Ultrasound guided paracentesis, as detailed above.    Supervising Physician: Mayra Laureano MD.    Dictated by (CST): Florina Iglesias on 11/20/2019 at 9

## 2019-11-20 NOTE — HOSPICE RN NOTE
Met with pt and wife today to discuss discharge plan and DME/med delivery. Explained that the pt will be discharged in the evening after PleurX procedure. Consents signed by pt. Wife will have caretaker stay at the house to accept DME and meds.  POC updated

## 2019-11-20 NOTE — PROGRESS NOTES
[facility]    Progress Note    Kj Hdez Patient Status:  Inpatient    1940 MRN [id number]   Location [facility] 4W/SW/SE Attending Vanessa Kinsey MD   Hosp Day # 2 PCP Godfrey Starkey MD       Subjective:   Kj Hdez 1-5 Units Subcutaneous TID CC   • furosemide  40 mg Oral Daily       Current PRN Inpatient Meds:      melatonin, Normal Saline Flush, acetaminophen, ondansetron HCl, morphINE sulfate **OR** morphINE sulfate **OR** morphINE sulfate, dextrose, Glucose-Vitami 11/18/19   1.  BODY FLUID CULT,AEROBIC AND ANAEROBIC     Status: None (Preliminary result)    Collection Time: 11/20/19  9:04 AM   Result Value Ref Range    Body Fld Smear 1+ WBCs seen N/A    Body Fld Smear No organisms seen N/A       Imaging/EKG:   Us Abdo diet  - Paracentesis and PleurX tomorrow    #Dementia  #Hx esophageal cancer  #DM2:  A1c 6.0 this admission    Diet: 1.5g salt  PT/OT: not indicated  DVT ppx: Lovenox  Code status: DNR  Risk level: high  Dispo: home hospice when arranged    Greater than 35

## 2019-11-20 NOTE — IMAGING NOTE
0805 Pt to ultrasound room scouts taken by [facility] Hx taken procedure explained questions answered     0815 Patient consented. Pt to get albumin 25% 25 grams yes or no     Dr. Kee Failing  Here scanning completed .   PLATELETS =   668   PT=  15.3   INR=

## 2019-11-20 NOTE — PLAN OF CARE
Pt A&Ox1, VSS. Pt denied pain. Pt incontinent at times, daily weights ordered to measure fluid status. Saline locked. Bed alarm on. Pt sleeping quietly throughout the night, wife at bedside assisting with care.      Problem: Patient Centered Care  Goal: Yamilka Correa assistance  - Assess pain using appropriate pain scale  - Administer analgesics based on type and severity of pain and evaluate response  - Implement non-pharmacological measures as appropriate and evaluate response  - Consider cultural and social influenc services based on physician/LIP order or complex needs related to functional status, cognitive ability or social support system  Outcome: Progressing

## 2019-11-20 NOTE — PAYOR COMM NOTE
--------------  CONTINUED STAY REVIEW-----REQUESTING ADDITIONAL DAY 11/20      Payor: Martha ALANIS EPO  Subscriber #:  [id number]  Authorization Number: [id number]    Admit date: 11/18/19  Admit time: [address] [place]    Admitting Physician: Ronny Mcleod MD ABDOMEN: Non-tender, distended, diminished BS  MUSCULOSKELETAL:  There was no deformity. There was full range of motion in all the extremities. EXTREMITIES: There was no edema  NEUROLOGICAL:  There was no focal deficit.     PSYCHIATRIC: Calm and cooperat Recent Labs   Lab 11/18/19  1130 11/19/19  0535 11/20/19  0513   * 117* 122*   BUN 24* 18 15   CREATSERUM 1.20 0.73 0.64*   GFRAA 65 101 106   GFRNAA 56* 87 92   CA 9.3 8.8 8.2*   ALB 3.1* 2.9*  --    * 135* 135*   K 5.2* 4.3 4.0   CL 98 103 1 CONCLUSION: Ultrasound guided paracentesis, as detailed above.    Supervising Physician: Lizbeth Sepulveda MD.    Dictated by (CST): Deemtra Iglesias on 11/18/2019 at 14:49     Approved by (CST): Demetra Iglesias on 11/18/2019 at 14:51               Assessment 11/19/2019 1600 Given 40 mg Oral Brittanie Thompson RN      Sertraline HCl (ZOLOFT) tab 25 mg     Date Action Dose Route User    11/19/2019 2022 Given 25 mg Oral Arlene Fabian RN      spironolactone (ALDACTONE) tab     Date Action Dose Route User    11/20

## 2019-11-20 NOTE — PLAN OF CARE
Pt alert x 2. Wife at bedside. VSS. On room air. PO lasix and spironolactone. SCDs and lovenox for DVT prophylaxis. Voiding in urinal. Paracentesis was done today, 760mL out. Plan for pleurx tomorrow. NPO starting at midnight.  Wife agreeable with home hos goal  Description  INTERVENTIONS:  - Encourage pt to monitor pain and request assistance  - Assess pain using appropriate pain scale  - Administer analgesics based on type and severity of pain and evaluate response  - Implement non-pharmacological measures Department for coordinating discharge planning if the patient needs post-hospital services based on physician/LIP order or complex needs related to functional status, cognitive ability or social support system  Outcome: Progressing

## 2019-11-21 NOTE — HOSPICE RN NOTE
Met with pt/wife at bedside to discuss discharge plan. Pt's PleurX is not being placed until around 3pm per Caryn Chambers; pt will need to stay in the hospital for about 2 hours after procedure.  With this being later in the day will plan for discharge in the mor

## 2019-11-21 NOTE — PLAN OF CARE
Pt A&Ox1-2, VSS. Denies pain. Saline locked. Pt using urinal in bed with help of his wife. NPO after midnight for pleurx placement tomorrow. Plan is then home with home hospice. Wife at bedside assisting with care overnight. Pt resting comfortably.      Pro Encourage pt to monitor pain and request assistance  - Assess pain using appropriate pain scale  - Administer analgesics based on type and severity of pain and evaluate response  - Implement non-pharmacological measures as appropriate and evaluate response planning if the patient needs post-hospital services based on physician/LIP order or complex needs related to functional status, cognitive ability or social support system  Outcome: Progressing

## 2019-11-21 NOTE — DISCHARGE SUMMARY
[facility]    Discharge Summary    Misty Magaña Patient Status:  Inpatient    1940 MRN [id number]   Location [facility] 4W/SW/SE Attending Wally Burk MD   Hosp Day # 3 PCP Leonce Seip, MD     Date of Admission: Admission:   Cirrhosis  Ascites  Dementia  Hx. Of Esophageal ca    Physical Exam:   See PN    History of Present Illness:   Per Dr. Lindsay Richardson:  Rapid recurrence of abdominal ascites.   HISTORY OF PRESENT ILLNESS:  Patient is an 61-year-old F by mouth daily. Quantity:  30 tablet  Refills:  0     spironolactone 100 MG Tabs  Commonly known as:  ALDACTONE  What changed:    · medication strength  · how much to take  · when to take this      Take 1 tablet (100 mg total) by mouth daily.    Quantity: MG Tabs  · spironolactone 100 MG Tabs         Other Discharge Instructions: f/u with hospice after dc    Jovan Sarah MD  11/21/2019  9:54 AM    > 35 min

## 2019-11-21 NOTE — PROGRESS NOTES
[facility]    Progress Note    Jackie Magallanes Patient Status:  Inpatient    1940 MRN [id number]   Location [facility] 4W/SW/SE Attending Bryanna Abdi MD   Hosp Day # 3 PCP Pedro Melendrez MD       Subjective:   Jackie Magallanes HCl  25 mg Oral Nightly   • enoxaparin  40 mg Subcutaneous Daily   • furosemide  40 mg Oral Daily       Current PRN Inpatient Meds:      melatonin, Normal Saline Flush, acetaminophen, ondansetron HCl, morphINE sulfate **OR** morphINE sulfate **OR** morphIN Culture:  Hospital Encounter on 11/18/19   1.  BODY FLUID CULT,AEROBIC AND ANAEROBIC     Status: None (Preliminary result)    Collection Time: 11/20/19  9:04 AM   Result Value Ref Range    Body Fld Smear 1+ WBCs seen N/A    Body Fld Smear No organisms

## 2019-11-21 NOTE — PLAN OF CARE
Vss, remains NPO for plurex drain placement, accuchecks Q6- WNL, lovenox on hold, rounded abdomen, denies any pain, voiding per urinal, wife at bedside assisting with care, plan for drain placement at 3pm. All needs met at this time- pt back from plurex pl Outcome: Progressing     Problem: PAIN - ADULT  Goal: Verbalizes/displays adequate comfort level or patient's stated pain goal  Description  INTERVENTIONS:  - Encourage pt to monitor pain and request assistance  - Assess pain using appropriate pain scale as appropriate  - Assess patient's ability to be responsible for managing their own health  - Refer to Case Management Department for coordinating discharge planning if the patient needs post-hospital services based on physician/LIP order or complex needs

## 2019-11-21 NOTE — PROGRESS NOTES
[facility]     Gastroenterology Progress Note    Aaron Corpus Patient Status:  Inpatient    1940 MRN [id number]   Location [facility] 4W/SW/SE Attending Inocente Escobedo MD   Hosp Day # 3 PCP Ethan Simmons MD HCT 24.9 (L) 11/20/2019    .0 (L) 11/20/2019    CREATSERUM 0.64 (L) 11/20/2019    BUN 15 11/20/2019     (L) 11/20/2019    K 4.0 11/20/2019     11/20/2019    CO2 27.0 11/20/2019     (H) 11/20/2019    CA 8.2 (L) 11/20/2019    ALB

## 2019-11-21 NOTE — PROCEDURES
[facility]OSP - [facility]  Procedure Note    Amanda Arreaga Patient Status:  Inpatient    1940 MRN [id number]   Location [facility] Attending Ramona Soto MD   Hosp Day # 3 PCP Gabino Cooper MD     Procedure: Crystal Brady

## 2019-11-21 NOTE — PLAN OF CARE
Patient: Ida Olmstead  MRN: [id number]  : 1940  Allergies: No Known Allergies      IR MD/ APN Pre-Procedure Plan  (Inpatients Only)    Date of IR Procedure: 2019  Procedure to be performed: Abd Pleurx    Room Modality: X-Ray  STAT/ Urgent: no,

## 2019-11-22 NOTE — TELEPHONE ENCOUNTER
Yes thanks   RN to set up follow up in the office with me in the next 4 weeks - ok to add, please arrange with his wife Arabella Cole

## 2019-11-22 NOTE — PLAN OF CARE
Patient alert to self. Accucheck ACHS. Denies pain. On bedrest post abdominal pleurx placement. Fall precautions in place. Call light within reach. Wife at bedside. Patient and wife updated on plan of care.  Plan for d/c home with [facility] t goal  Description  INTERVENTIONS:  - Encourage pt to monitor pain and request assistance  - Assess pain using appropriate pain scale  - Administer analgesics based on type and severity of pain and evaluate response  - Implement non-pharmacological measures Department for coordinating discharge planning if the patient needs post-hospital services based on physician/LIP order or complex needs related to functional status, cognitive ability or social support system  Outcome: Progressing

## 2019-11-22 NOTE — TELEPHONE ENCOUNTER
Spoke with nursing supervisor (Joelle Spencer RN) obtained permission to add patient on 12/20/19. Dr. Doreatha Hatchet was contacted regarding setting up an appointment. She wanted me to inform you that the patient is now under home hospice.

## 2019-11-22 NOTE — PLAN OF CARE
Pt doing well this AM . Accucheck WNL, tolerating current diet, denies any pain or discomfort, plurex drain maintained and education with demonstration provided on sterile technique when draining the plurex at home.  Wife and care giver at the bedside, pt t Adequate for Discharge     Problem: PAIN - ADULT  Goal: Verbalizes/displays adequate comfort level or patient's stated pain goal  Description  INTERVENTIONS:  - Encourage pt to monitor pain and request assistance  - Assess pain using appropriate pain scale partner  - Complete POLST form as appropriate  - Assess patient's ability to be responsible for managing their own health  - Refer to Case Management Department for coordinating discharge planning if the patient needs post-hospital services based on physic

## 2019-11-22 NOTE — TELEPHONE ENCOUNTER
Patient went home with pleurex and hospice  Dr. Louisa Meneses you want to follow up?     Gold Alex MD

## 2019-11-22 NOTE — DISCHARGE SUMMARY
[facility]    Discharge Summary    Refugio Keli Patient Status:  Inpatient    1940 MRN [id number]   Location [facility] 4W/SW/SE Attending Karla Minor MD   Hosp Day # 4 PCP Amparo Hill MD     Date of Admission: Admission:   Cirrhosis  Ascites  Dementia  Hx. Of Esophageal ca    Physical Exam:   See PN    History of Present Illness:   Per Dr. Bushra Ramires:  Rapid recurrence of abdominal ascites.   HISTORY OF PRESENT ILLNESS:  Patient is an 78-year-old F by mouth daily. Quantity:  30 tablet  Refills:  0     spironolactone 100 MG Tabs  Commonly known as:  ALDACTONE  What changed:    · medication strength  · how much to take  · when to take this      Take 1 tablet (100 mg total) by mouth daily.    Quantity: MG Tabs  · spironolactone 100 MG Tabs         Other Discharge Instructions: f/u with hospice after dc    Nina Salvador MD  11/22/2019  9:54 AM    > 35 min

## 2019-11-22 NOTE — PAYOR COMM NOTE
--------------  DISCHARGE REVIEW-----REQUESTING ADDITIONAL DAY 11/21 WITH DISCHARGE ON 11/22      Payor: Britany REYES  Subscriber #:  [id number]  Authorization Number: [id number]    Admit date: 11/18/19  Admit time:  7040  Discharge Date: 11/22/ Class 1 obesity due to excess calories with serious comorbidity and body mass index (BMI) of 34.0 to 34.9 in adult     Anemia     Metabolic acidosis     Hyperglycemia     Anemia, unspecified type     Fall, initial encounter     Dehydration     Malignant #Cirrhosis with worsening ascites  - Ascites removed 10L on 11/15, and 5.5L on 11/18. Culture and cytology pending.  - Furosemide, spironolactone, and low salt diet  - Paracentesis and PleurX today      #Dementia  #Hx esophageal cancer  #DM2:  A1c 6.0 this Pantoprazole Sodium 40 MG Tbec  Commonly known as:  PROTONIX      Take 1 tablet (40 mg total) by mouth 2 (two) times daily before meals. 30 minutes before breakfast and dinner.    Quantity:  180 tablet  Refills:  1     Sertraline HCl 25 MG Tabs  Commonly kn Resp: 18 18 18 18   Temp: 98.1 °F (36.7 °C) 97.5 °F (36.4 °C) 98.1 °F (36.7 °C) 97.9 °F (36.6 °C)   TempSrc: Oral Oral Oral Oral   SpO2: 94% 93% 96% 95%   Weight:       227 lb 8.2 oz (103.2 kg)      Patient Weight for the past 72 hrs:    Weight   11/18/19   ALB 2.9 (L) 11/19/2019     ALKPHO 195 (H) 11/19/2019     BILT 1.5 11/19/2019     TP 6.1 (L) 11/19/2019     AST 57 (H) 11/19/2019     ALT 41 11/19/2019     PTT 34.3 06/13/2019     INR 1.22 (H) 11/15/2019     PT 13.1 05/06/2015     T4F 1.1 02/20/2019     T Assessment and Plan:   Sylvia Nguyen is an 77yo [place]ian male w/ hx sig for recurrent ascites, cirrhosis, esophageal cancer, DM2 and dementia, who p/w rapid recurrence of ascites.      #Goal of care  - Wife has decided home hospice after PleurX placement.   -

## 2019-11-27 NOTE — TELEPHONE ENCOUNTER
DEMI;  ,    Spoke with Mariaelena Dumont Pt's spouse regarding the US liver order . Per Mariaelena Dumont statement Pt is placed in [facility]  And she wanted just to inform you that if we can cancel this Order for US Liver from 10/28/19 .

## 2019-12-11 ENCOUNTER — TELEPHONE (OUTPATIENT)
Dept: INTERNAL MEDICINE CLINIC | Facility: CLINIC | Age: 79
End: 2019-12-11

## 2022-01-26 NOTE — TELEPHONE ENCOUNTER
EMERGENCY DEPARTMENT HISTORY AND PHYSICAL EXAM    Date: 1/26/2022  Patient Name: Miguel Davidson    History of Presenting Illness     Chief Complaint   Patient presents with    Abdominal Pain     RLQ pain. problems with urination, hx of kideny stone, requesting CT. History Provided By: Patient    HPI: Miguel Davidson is a 60 y.o. male with a PMH of rectal CA, hypertension, gastric ulcers who presents with right lower quadrant pain x3 days, dysuria and pain when having a bowel movement. Patient states he was already scheduled for his yearly CT for follow-up with history of rectal cancer but wanted to come to the ED since he was having pain. Patient reports decreased appetite x1 week and rates pain 7 out of 10. He has not taken anything for symptoms prior to arrival.  He denies any fevers or chills but does report feeling \"wobbly\"      PCP: Rupali Celeste MD    Current Facility-Administered Medications   Medication Dose Route Frequency Provider Last Rate Last Admin    heparin (porcine) pf 300 Units  300 Units InterCATHeter NOW Margi Acevedo PA-C         Current Outpatient Medications   Medication Sig Dispense Refill    cephALEXin (Keflex) 500 mg capsule Take 1 Capsule by mouth two (2) times a day for 7 days. 14 Capsule 0    traMADoL (Ultram) 50 mg tablet Take 1 Tablet by mouth every six (6) hours as needed for Pain for up to 3 days. Max Daily Amount: 200 mg. 10 Tablet 0    tamsulosin (FLOMAX) 0.4 mg capsule Take 1 Capsule by mouth daily. 30 Capsule 1    thiamine mononitrate (B-1) 100 mg tablet Take 1 Tablet by mouth daily. 30 Tablet 1    folic acid (FOLVITE) 1 mg tablet Take 1 Tablet by mouth daily. 30 Tablet 1    finasteride (PROSCAR) 5 mg tablet Take 5 mg by mouth daily.  mirtazapine (REMERON) 15 mg tablet Take 15 mg by mouth nightly.  famotidine (Pepcid) 20 mg tablet Take 20 mg by mouth nightly as needed for Heartburn.       metoprolol tartrate (LOPRESSOR) 25 mg msg left on vm, pt or his wife Dale Stanford to call to review. tablet Take 12.5 mg by mouth two (2) times a day. Past History     Past Medical History:  Past Medical History:   Diagnosis Date    Cancer [facility])     rectal    Gastrointestinal disorder     Gastric Ulcers; Rectal CA    Hematuria 9/1/2021    Hypertension        Past Surgical History:  Past Surgical History:   Procedure Laterality Date    HX GI         Family History:  Family History   Problem Relation Age of Onset    Cancer Brother        Social History:  Social History     Tobacco Use    Smoking status: Current Every Day Smoker     Packs/day: 0.25    Smokeless tobacco: Never Used   Substance Use Topics    Alcohol use: Yes     Comment: 2-3 beers daily    Drug use: No       Allergies: Allergies   Allergen Reactions    Influenza Virus Vaccines Anaphylaxis         Review of Systems   Review of Systems   Constitutional: Negative for chills and fever. Gastrointestinal: Positive for abdominal pain. Negative for nausea and vomiting. Genitourinary: Positive for difficulty urinating and dysuria. Allergic/Immunologic: Negative for immunocompromised state. Neurological: Negative for speech difficulty and weakness. All other systems reviewed and are negative. Physical Exam     Vitals:    01/26/22 0919 01/26/22 1206   BP: 126/79 (!) 169/79   Pulse: 94 64   Resp: 18 16   Temp: 97.7 °F (36.5 °C)    SpO2: 95%    Weight: 48.1 kg (106 lb)    Height: 5' 8\" (1.727 m)      Physical Exam  Vitals and nursing note reviewed. Constitutional:       General: He is not in acute distress. Appearance: He is well-developed and underweight. HENT:      Head: Normocephalic and atraumatic. Mouth/Throat:      Pharynx: No oropharyngeal exudate. Eyes:      Conjunctiva/sclera: Conjunctivae normal.   Cardiovascular:      Rate and Rhythm: Normal rate and regular rhythm. Heart sounds: Normal heart sounds. Pulmonary:      Effort: Pulmonary effort is normal. No respiratory distress.       Breath sounds: Normal breath sounds. No wheezing or rales. Abdominal:      Tenderness: There is abdominal tenderness in the right lower quadrant. There is right CVA tenderness and left CVA tenderness. There is no guarding or rebound. Musculoskeletal:         General: Normal range of motion. Skin:     General: Skin is warm and dry. Neurological:      Mental Status: He is alert and oriented to person, place, and time. Diagnostic Study Results     Labs -     Recent Results (from the past 12 hour(s))   URINALYSIS W/ REFLEX CULTURE    Collection Time: 01/26/22  9:39 AM    Specimen: Urine   Result Value Ref Range    Color DARK YELLOW      Appearance TURBID (A) CLEAR      Specific gravity 1.020 1.003 - 1.030      pH (UA) 6.0 5.0 - 8.0      Protein 30 (A) NEG mg/dL    Glucose Negative NEG mg/dL    Ketone Negative NEG mg/dL    Bilirubin Negative NEG      Blood LARGE (A) NEG      Urobilinogen 0.2 0.2 - 1.0 EU/dL    Nitrites Negative NEG      Leukocyte Esterase LARGE (A) NEG      WBC  0 - 4 /hpf    RBC 10-20 0 - 5 /hpf    Epithelial cells FEW FEW /lpf    Bacteria Negative NEG /hpf    UA:UC IF INDICATED URINE CULTURE ORDERED (A) CNI     CBC WITH AUTOMATED DIFF    Collection Time: 01/26/22  9:39 AM   Result Value Ref Range    WBC 20.0 (H) 4.1 - 11.1 K/uL    RBC 3.14 (L) 4.10 - 5.70 M/uL    HGB 10.1 (L) 12.1 - 17.0 g/dL    HCT 29.0 (L) 36.6 - 50.3 %    MCV 92.4 80.0 - 99.0 FL    MCH 32.2 26.0 - 34.0 PG    MCHC 34.8 30.0 - 36.5 g/dL    RDW 15.2 (H) 11.5 - 14.5 %    PLATELET 822 (H) 055 - 400 K/uL    MPV 9.7 8.9 - 12.9 FL    NRBC 0.0 0  WBC    ABSOLUTE NRBC 0.00 0.00 - 0.01 K/uL    NEUTROPHILS 83 (H) 32 - 75 %    LYMPHOCYTES 7 (L) 12 - 49 %    MONOCYTES 8 5 - 13 %    EOSINOPHILS 0 0 - 7 %    BASOPHILS 0 0 - 1 %    IMMATURE GRANULOCYTES 2 (H) 0.0 - 0.5 %    ABS. NEUTROPHILS 16.5 (H) 1.8 - 8.0 K/UL    ABS. LYMPHOCYTES 1.5 0.8 - 3.5 K/UL    ABS. MONOCYTES 1.6 (H) 0.0 - 1.0 K/UL    ABS.  EOSINOPHILS 0.1 0.0 - 0.4 K/UL ABS. BASOPHILS 0.0 0.0 - 0.1 K/UL    ABS. IMM. GRANS. 0.4 (H) 0.00 - 0.04 K/UL    DF AUTOMATED     METABOLIC PANEL, COMPREHENSIVE    Collection Time: 01/26/22  9:39 AM   Result Value Ref Range    Sodium 140 136 - 145 mmol/L    Potassium 3.8 3.5 - 5.1 mmol/L    Chloride 103 97 - 108 mmol/L    CO2 28 21 - 32 mmol/L    Anion gap 9 5 - 15 mmol/L    Glucose 95 65 - 100 mg/dL    BUN 11 6 - 20 MG/DL    Creatinine 1.05 0.70 - 1.30 MG/DL    BUN/Creatinine ratio 10 (L) 12 - 20      GFR est AA >60 >60 ml/min/1.73m2    GFR est non-AA >60 >60 ml/min/1.73m2    Calcium 9.0 8.5 - 10.1 MG/DL    Bilirubin, total 0.7 0.2 - 1.0 MG/DL    ALT (SGPT) 19 12 - 78 U/L    AST (SGOT) 14 (L) 15 - 37 U/L    Alk. phosphatase 216 (H) 45 - 117 U/L    Protein, total 7.2 6.4 - 8.2 g/dL    Albumin 2.2 (L) 3.5 - 5.0 g/dL    Globulin 5.0 (H) 2.0 - 4.0 g/dL    A-G Ratio 0.4 (L) 1.1 - 2.2     LIPASE    Collection Time: 01/26/22  9:39 AM   Result Value Ref Range    Lipase 106 73 - 393 U/L       Radiologic Studies -   CT ABD PELV W CONT   Final Result      1. Interval development of right obstruction with right hydronephrosis and right   hydroureter to the ureterovesical junction with no identified obstructing mass   lesion combined with urinary bladder wall thickening and mucosal enhancement   with focal dilation of the distal left ureter at the ureterovesical junction may   reflect sequela of radiation therapy or urinary tract infection, however   underlying neoplastic lesion cannot be excluded radiographically. 2. 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 3. Incidentals as above. CT Results  (Last 48 hours)               01/26/22 1159  CT ABD PELV W CONT Final result    Impression:      1.  Interval development of right obstruction with right hydronephrosis and right   hydroureter to the ureterovesical junction with no identified obstructing mass   lesion combined with urinary bladder wall thickening and mucosal enhancement   with focal dilation of the distal left ureter at the ureterovesical junction may   reflect sequela of radiation therapy or urinary tract infection, however   underlying neoplastic lesion cannot be excluded radiographically. 2. 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 3. Incidentals as above. Narrative:  EXAM: CT ABD PELV W CONT       INDICATION: generalized abd pain, worse on the right side, hx of rectal CA   underwent neoadjuvant chemoradiation and LAR. COMPARISON: CT 9/30/2020. CONTRAST: 100 mL of Isovue-370. TECHNIQUE:    Following the uneventful intravenous administration of contrast, thin axial   images were obtained through the abdomen and pelvis. Coronal and sagittal   reconstructions were generated. Oral contrast was not administered. CT dose   reduction was achieved through use of a standardized protocol tailored for this   examination and automatic exposure control for dose modulation. FINDINGS:    LOWER THORAX: No significant abnormality in the incidentally imaged lower chest.   LIVER: No mass. BILIARY TREE: Gallbladder is surgically absent. CBD is not dilated. SPLEEN: Unremarkable. PANCREAS: No significant change in 1.4 x 1.7 x 2.2 cm cystic lesion of the tail   of pancreas with diffuse pancreatic parenchymal calcifications and mild   pancreatic duct dilation to 6 mm. ADRENALS: Unremarkable. KIDNEYS: Moderate to severe right hydronephrosis and hydroureter extending to   the urinary bladder with similar dilation of the left ureter at the   ureterovesical junction but no upstream hydroureter or left hydronephrosis. There is slight delayed nephrogram on the right compared to left with no   calculus or enhancing mass identified. STOMACH: Unremarkable. SMALL BOWEL: No dilatation or wall thickening.    COLON: No dilatation or wall thickening. Anastomotic suture line at the rectum   with associated presacral soft tissue density and no evident focal mass lesion. APPENDIX: Unremarkable. PERITONEUM: No ascites or pneumoperitoneum. RETROPERITONEUM: No lymphadenopathy or aortic aneurysm. REPRODUCTIVE ORGANS: Prostate and seminal vesicles are unremarkable. URINARY BLADDER: Slight improvement in appearance of uniformly thickened   appearing wall with mucosal enhancement and no evident calculus or mass. BONES: Degenerative spine change. ABDOMINAL WALL: No mass or hernia. Mild degenerative spine change. ADDITIONAL COMMENTS: N/A               CXR Results  (Last 48 hours)    None            Medical Decision Making   I am the first provider for this patient. I reviewed the vital signs, available nursing notes, past medical history, past surgical history, family history and social history. Vital Signs-Reviewed the patient's vital signs. Records Reviewed: Nursing Notes and Old Medical Records    Provider Notes (Medical Decision Making):   Patient presents with abdominal pain, dysuria and pain with bowel movement x3 days. DDx: Pyelonephritis, UTI, gastroenteritis, SBO, appendicitis, colitis, IBD, diverticulitis, mesenteric ischemia, AAA or descending dissection, ACS, ureteral stone. Will get labs and ordered patient's already scheduled CT Abdomen/ pelvis. ED Course as of 01/26/22 1337   Wed Jan 26, 2022   1306 Discussed case and CT results with attending Dr. Consuelo Gottlieb, who advises that patient just needs outpatient follow-up with urology and oncology. Will treat UTI which should relieve symptoms as they are acute in nature. Either way patient should follow-up with subspecialties listed previously. [AH]      ED Course User Index  [AH] Elizabeth Berman PA-C          Disposition:  Discharge    DISCHARGE NOTE:   1:37 PM      Care plan outlined and precautions discussed.   Patient has no new complaints, changes, or physical findings. Results of CT and labs were reviewed with the patient. All medications were reviewed with the patient; will d/c home. All of pt's questions and concerns were addressed. Patient was instructed and agrees to follow up with urology and oncology, as well as to return to the ED upon further deterioration. Patient is ready to go home. Follow-up Information     Follow up With Specialties Details Why [address] [facility], [address], [place] Physician Assistant On 2/2/2022 Urologist.  Your appointment time 1220 please bring picture ID, insurance card,co-pay and mask [facility]  [address]  [address]      Federico Wiggins MD Hematology and Oncology, Internal Medicine, Hematology, Oncology Schedule an appointment as soon as possible for a visit  for follow up on CT results [address]  [address]            Current Discharge Medication List      START taking these medications    Details   cephALEXin (Keflex) 500 mg capsule Take 1 Capsule by mouth two (2) times a day for 7 days. Qty: 14 Capsule, Refills: 0  Start date: 1/26/2022, End date: 2/2/2022      traMADoL (Ultram) 50 mg tablet Take 1 Tablet by mouth every six (6) hours as needed for Pain for up to 3 days. Max Daily Amount: 200 mg. Qty: 10 Tablet, Refills: 0  Start date: 1/26/2022, End date: 1/29/2022    Associated Diagnoses: Hydronephrosis with ureteropelvic junction (UPJ) obstruction             Procedures:  Procedures    Please note that this dictation was completed with Dragon, computer voice recognition software. Quite often unanticipated grammatical, syntax, homophones, and other interpretive errors are inadvertently transcribed by the computer software. Please disregard these errors. Additionally, please excuse any errors that have escaped final proofreading. Diagnosis     Clinical Impression:   1. Hydronephrosis with ureteropelvic junction (UPJ) obstruction    2.  Acute cystitis with hematuria

## 2022-05-06 NOTE — TELEPHONE ENCOUNTER
PATIENT NEEDS AN ORDER FOR LABS no chest pain/no palpitations/no dyspnea on exertion/no orthopnea/no paroxysmal nocturnal dyspnea/no peripheral edema/no claudication

## (undated) DEVICE — Device: Brand: CUSTOM PROCEDURE KIT

## (undated) DEVICE — ELEVIEW INJ AGENT USED FOR ESD

## (undated) DEVICE — DISPOSABLE EMR KIT: Brand: DISPOSABLE EMR KIT

## (undated) DEVICE — LINE MNTR ADLT SET O2 INTMD

## (undated) DEVICE — Device: Brand: DEFENDO AIR/WATER/SUCTION AND BIOPSY VALVE

## (undated) DEVICE — Device

## (undated) DEVICE — ENDOSCOPY PACK UPPER: Brand: MEDLINE INDUSTRIES, INC.

## (undated) DEVICE — THE CARR-LOCKE INJECTION NEEDLE IS A SINGLE USE, DISPOSABLE, FLEXIBLE SHEATH INJECTION NEEDLE USED FOR THE INJECTION OF VARIOUS TYPES OF MEDIA THROUGH FLEXIBLE ENDOSCOPES.

## (undated) DEVICE — FORCEP RADIAL JAW 4

## (undated) DEVICE — CATH GOLD PROBE HEMOGLIDE 7FR

## (undated) DEVICE — CANNULA NASAL 02/C02 ADULT

## (undated) DEVICE — TRAP 4 CPTR CHMBR N EZ INLN

## (undated) DEVICE — CLIP RESOLUTION 235CM

## (undated) DEVICE — CONMED SCOPE SAVER BITE BLOCK, 20X27 MM: Brand: SCOPE SAVER

## (undated) DEVICE — SNARE OPTMZ PLPCTM TRP

## (undated) DEVICE — SNARE CAPTIFLEX MICRO-OVL OLY

## (undated) DEVICE — ENDOSCOPY PACK - LOWER: Brand: MEDLINE INDUSTRIES, INC.

## (undated) DEVICE — 35 ML SYRINGE REGULAR TIP: Brand: MONOJECT

## (undated) DEVICE — Device: Brand: BALLOON3

## (undated) DEVICE — REM POLYHESIVE ADULT PATIENT RETURN ELECTRODE: Brand: VALLEYLAB

## (undated) DEVICE — MEDI-VAC NON-CONDUCTIVE SUCTION TUBING 6MM X 1.8M (6FT.) L: Brand: CARDINAL HEALTH

## (undated) NOTE — IP AVS SNAPSHOT
[facility]            (For Outpatient Use Only) Initial Admit Date: 11/18/2019   Inpt/Obs Admit Date: Inpt: 11/18/19 / Obs: N/A   Discharge Date:    Celina Templeton:  [de-identified]   MRN: [de-identified]   CSN: [id number]   CEID: [id number]        E Subscriber ID:  Pt Rel to Subscriber:    Hospital Account Financial Class: [facility] [place]    November 22, 2019

## (undated) NOTE — ED AVS SNAPSHOT
Joseph Gregory   MRN: [id number]    Department:  [facility] Emergency Department   Date of Visit:  6/13/2019           Disclosure     Insurance plans vary and the physician(s) referred by the ER may not be covered by your plan.  Please contact your within the next three months to obtain basic health screening including reassessment of your blood pressure.     IF THERE IS ANY CHANGE OR WORSENING OF YOUR CONDITION, CALL YOUR PRIMARY CARE PHYSICIAN AT ONCE OR RETURN IMMEDIATELY TO THE EMERGENCY DEPARTMEN

## (undated) NOTE — LETTER
[address] [place] [address] Rd, [place], [place]     AUTHORIZATION FOR SURGICAL OPERATION OR PROCEDURE    I hereby authorize Dr. Marques Miller, my Physician(s) and whomever may be designated as the doctor's Assistant, to perform the following operat 4. I consent to the photographing of procedure(s) to be performed for the purposes of advancing medicine, science and/or education, provided my identity is not revealed.  If the procedure has been videotaped, the physician/surgeon will obtain the original v (Witness signature)                                                                                                  (Date)                                (Time)  STATEMENT OF PHYSICIAN My signature below affirms that prior to the time of the procedure;  I

## (undated) NOTE — LETTER
[address] [place] [address] Rd, [place], [place]     AUTHORIZATION FOR SURGICAL OPERATION OR PROCEDURE    I hereby authorize                                     , my Physician(s) and whomever may be designated as the doctor's Assistant, to 4. I consent to the photographing of procedure(s) to be performed for the purposes of advancing medicine, science and/or education, provided my identity is not revealed.  If the procedure has been videotaped, the physician/surgeon will obtain the original v (Witness signature)                                                                                                  (Date)                                (Time)  STATEMENT OF PHYSICIAN My signature below affirms that prior to the time of the procedure;  I

## (undated) NOTE — IP AVS SNAPSHOT
Patient Demographics     Address  [address] [place] [address] Phone  [phone] [facility])  [phone] (Mobile) *Preferred* E-mail Address  [email or web address]      Emergency Contact(s)     Name Relation Home Work [address] [facility] Take 1 tablet (25 mg total) by mouth nightly. Willy Clements MD         spironolactone 100 MG Tabs  Commonly known as:  ALDACTONE      Take 1 tablet (100 mg total) by mouth daily.    Leander Shabazz MD               Where to Get Your Medications Only the INR (not the Protime value) should be utilized for   the monitoring of oral anticoagulant therapy.      Recommended therapeutic ranges for anticoagulant therapy are   as follows:   2.0 - 3.0 All indications except for mechanical prosthetic   cardia Liver function test showed total bilirubin of 1.2 and direct 0.6. CBC is unremarkable. Platelet count 039. Chest x-ray showed no air space disease.   Patient will be sent for paracentesis per Interventional Radiology and will be admitted to the hospital LUNGS:  Clear to auscultation bilaterally. Normal respiratory effort. HEART:  Regular rate, rhythm. S1 and S2 auscultated. No murmur. ABDOMEN:  Moderate to severe with tenuous ascites. No tenderness. Hypoactive bowel sounds.   EXTREMITIES:  Trace e Beryle Session is a a(n) 78year old male w/ a history of esophageal CA, alcohol and [place] cirhosis complicated with ascites, DMII, obesity, HTN and HL who presents with fluid overload.  Patient was recently seen in clinic with Dr. Yamil Pichardo with similar complaints quit in [address] (hard of hearing)    • Mumps    • Nephrolithiasis    • Problems with swallowing    • Thrombocytopenia (HCC)    • Visual impairment     glasses     Past Surgical History:   Procedure Laterality Date   • CATARACT EXTRACTION Bilateral 20 smokeless tobacco. He reports that he does not drink alcohol or use drugs.     Allergies:  No Known Allergies    Medications:    Current Facility-Administered Medications:   •  Normal Saline Flush 0.9 % injection 3 mL, 3 mL, Intravenous, PRN  •  acetaminoph Laboratory Data:  Lab Results   Component Value Date    WBC 6.5 11/18/2019    HGB 9.6 11/18/2019    HCT 29.8 11/18/2019    .0 11/18/2019    CREATSERUM 1.20 11/18/2019    BUN 24 11/18/2019     11/18/2019    K 5.2 11/18/2019    CL 98 11/18/2019 muscle mass and decrease the need for paracentesis. Also no hx of encephalopathy. Will discuss with Dr. Mireya Doan and patient's wife[AL.2]  -[AL.1] s/p therapeutic paracentesis.  Added on culture and cell count given abdominal pain  - US doppler without mass or Type 2 diabetes mellitus without complication, without long-term current use of insulin (HCC)     Essential hypertension with goal blood pressure less than 130/85     Cough     Nightmares     Diabetic retinopathy associated with controlled type 2 diabet Refugio Dickey is an 79yo [place] male w/ hx sig for recurrent ascites, cirrhosis, esophageal cancer, DM2 and dementia, who p/w rapid recurrence of ascites.      #Goal of care  - Wife has decided home hospice after PleurX placement.   -  Plan home with hospic Take 100 mg by mouth daily. Refills:  0     Donepezil HCl 10 MG Tabs  Commonly known as:  ARICEPT      Take 1 tablet (10 mg total) by mouth nightly. Quantity:  90 tablet  Refills:  3     Melatonin 5 MG Tabs      Take 5 mg by mouth nightly as needed. through 11/22/2019  7:47 AM)    No notes of this type exist for this encounter. Video Swallow Study Notes    No notes of this type exist for this encounter. SLP Notes    No notes of this type exist for this encounter.      Immunizations     Name Mendoza

## (undated) NOTE — LETTER
[address] [place] [address] Rd, [place], [place]     AUTHORIZATION FOR SURGICAL OPERATION OR PROCEDURE    I hereby authorizeDR                                       , my Physician(s) and whomever may be designated as the doctor's Assistant, t Physician. 4. I consent to the photographing of procedure(s) to be performed for the purposes of advancing medicine, science and/or education, provided my identity is not revealed.  If the procedure has been videotaped, the physician/surgeon will obtain th (Witness signature)                                                                                                  (Date)                                (Time)  STATEMENT OF PHYSICIAN My signature below affirms that prior to the time of the procedure;  I

## (undated) NOTE — MR AVS SNAPSHOT
[address] [address]  [phone]               Thank you for choosing us for your health care visit with Ana Sparks MD.  We are glad to serve you and happy to provide you with this s Commonly known as:  ONETOUCH ULTRA BLUE           lisinopril 10 MG Tabs   Take 1 tablet (10 mg total) by mouth daily.    Commonly known as:  PRINIVIL,ZESTRIL           MetFORMIN HCl 850 MG Tabs   Take 1 tablet (850 mg total) by mouth 2 (two) times daily wit

## (undated) NOTE — LETTER
[facility]  Administration of Anesthesia  1. Efrain Haas, or _________________________________ acting on his behalf, (Patient) (Dependent/Representative) request to receive anesthesia for my pending procedure/operation/treatment.   A angy infections, high spinal block, spinal bleeding, seizure, cardiac arrest and death. 7. AWARENESS: I understand that it is possible (but unlikely) to have explicit memory of events from the operating room while under general anesthesia.   8. ELECTROCONVULSIV unconscious pt /Relationship    My signature below affirms that prior to the time of the procedure, I have explained to the patient and/or his/her guardian, the risks and benefits of undergoing anesthesia, as well as any reasonable alternatives.     _______

## (undated) NOTE — LETTER
7/10/2018          Razia Raiza  [facility]    Dear Leonila Torres,       Here are the biopsy/pathology findings from your recent EGD (Upper  Endoscopy):  1. Cancer cells were noted in area of nodularity in the esophagus as discussed.

## (undated) NOTE — LETTER
[address] [place] [address] Rd, [place], [place]     AUTHORIZATION FOR SURGICAL OPERATION OR PROCEDURE    I hereby authorize  Maegan Leary my Physician(s) and whomever may be designated as the doctor's Assistant, to perform the following operat 4. I consent to the photographing of procedure(s) to be performed for the purposes of advancing medicine, science and/or education, provided my identity is not revealed.  If the procedure has been videotaped, the physician/surgeon will obtain the original v (Witness signature)                                                                                                  (Date)                                (Time)  STATEMENT OF PHYSICIAN My signature below affirms that prior to the time of the procedure;  I

## (undated) NOTE — LETTER
[address] [place] [address] Rd, YENNI Espinoza     AUTHORIZATION FOR SURGICAL OPERATION OR PROCEDURE    I hereby authorize___________________________, my Physician(s) and whomever may be designated as the doctor's Assistant, to perform the Physician. 4. I consent to the photographing of procedure(s) to be performed for the purposes of advancing medicine, science and/or education, provided my identity is not revealed.  If the procedure has been videotaped, the physician/surgeon will obtain th (Witness signature)                                                                                                  (Date)                                (Time)  STATEMENT OF PHYSICIAN My signature below affirms that prior to the time of the procedure;  I

## (undated) NOTE — LETTER
[address] [place] [address]  Authorization for Surgical Operation or Procedure         1.  I hereby authorize ______________________________ , my physician and the assistant, to perform the following operation and/or procedure: ULTRASOUND 5. I consent to the photographing of the operations or procedures to be performed for the purposes of advancing medicine, science, and/or education, provided my identity is not revealed.  If the procedure has been videotaped, the physician/surgeon will obta risks and benefits involved in the proposed treatment and any reasonable alternative to the proposed treatment. I have also explained the risks and benefits involved in the refusal of the proposed treatment and have answered the patient's questions.  If I h

## (undated) NOTE — MR AVS SNAPSHOT
[place]  [address] [facility] [address] [phone]               Thank you for choosing us for your health care visit with Naya Miller MD.  We are glad to serve you and happy to provide you with this s - when to take this   Commonly known as:  GLUCOPHAGE           ONETOUCH ULTRA BLUE Strp   Generic drug:  Glucose Blood   TEST 1 TIME DAILY           Sildenafil Citrate 100 MG Tabs   Take 1 tablet (100 mg total) by mouth daily as needed for Erectile Dysfunc Make half your plate fruits and vegetables Highly refined, white starches including white bread, rice and pasta   Eat plenty of protein, keep the fat content low Sugars:  sodas and sports drinks, candies and desserts   Eat plenty of low-fat dairy products

## (undated) NOTE — Clinical Note
Pepper Dougherty! Could you please call Tisha at [facility] (pt's wife request). Pt would like to initiate hospice care at home.  Pt's Wife- Arpit Valle [phone] is currently in ER now, most likely going to have a paracentesis and then be sent home late

## (undated) NOTE — LETTER
[facility]  [address] [facility], [address] [facility]  Dept: [phone]    3/26/2018        Amaury Blanton        [address] [place] [address] [address]        [facility] [address]             Dear Darlin Mehta,    Our records i

## (undated) NOTE — LETTER
[address], [place], [place]     AUTHORIZATION FOR SURGICAL OPERATION OR PROCEDURE    I hereby authorize Bennett SWEENEY , my Physician(s) and whomever may be designated as the doctor's Assistant, to perform the following o 4. I consent to the photographing of procedure(s) to be performed for the purposes of advancing medicine, science and/or education, provided my identity is not revealed.  If the procedure has been videotaped, the physician/surgeon will obtain the original v (Witness signature)                                                                                                  (Date)                                (Time)  STATEMENT OF PHYSICIAN My signature below affirms that prior to the time of the procedure;  I